# Patient Record
Sex: FEMALE | Race: WHITE | NOT HISPANIC OR LATINO | Employment: FULL TIME | URBAN - METROPOLITAN AREA
[De-identification: names, ages, dates, MRNs, and addresses within clinical notes are randomized per-mention and may not be internally consistent; named-entity substitution may affect disease eponyms.]

---

## 2017-07-03 ENCOUNTER — RADIANT APPOINTMENT (OUTPATIENT)
Dept: MAMMOGRAPHY | Facility: CLINIC | Age: 42
End: 2017-07-03
Attending: FAMILY MEDICINE
Payer: COMMERCIAL

## 2017-07-03 DIAGNOSIS — Z12.31 VISIT FOR SCREENING MAMMOGRAM: ICD-10-CM

## 2017-07-03 PROCEDURE — G0202 SCR MAMMO BI INCL CAD: HCPCS

## 2018-07-03 ENCOUNTER — HEALTH MAINTENANCE LETTER (OUTPATIENT)
Age: 43
End: 2018-07-03

## 2018-07-19 DIAGNOSIS — M79.629: Primary | ICD-10-CM

## 2018-07-25 ENCOUNTER — RADIANT APPOINTMENT (OUTPATIENT)
Dept: MAMMOGRAPHY | Facility: CLINIC | Age: 43
End: 2018-07-25
Payer: COMMERCIAL

## 2018-07-25 DIAGNOSIS — M79.629: ICD-10-CM

## 2018-09-08 ENCOUNTER — OFFICE VISIT (OUTPATIENT)
Dept: URGENT CARE | Facility: URGENT CARE | Age: 43
End: 2018-09-08
Payer: COMMERCIAL

## 2018-09-08 VITALS
SYSTOLIC BLOOD PRESSURE: 130 MMHG | WEIGHT: 189.8 LBS | OXYGEN SATURATION: 96 % | TEMPERATURE: 98.4 F | DIASTOLIC BLOOD PRESSURE: 84 MMHG | HEART RATE: 84 BPM | BODY MASS INDEX: 28.23 KG/M2

## 2018-09-08 DIAGNOSIS — J98.01 ACUTE BRONCHOSPASM: ICD-10-CM

## 2018-09-08 DIAGNOSIS — J20.8 VIRAL BRONCHITIS: Primary | ICD-10-CM

## 2018-09-08 PROCEDURE — 99213 OFFICE O/P EST LOW 20 MIN: CPT | Performed by: NURSE PRACTITIONER

## 2018-09-08 RX ORDER — PREDNISONE 20 MG/1
20 TABLET ORAL 2 TIMES DAILY
Qty: 10 TABLET | Refills: 0 | Status: SHIPPED | OUTPATIENT
Start: 2018-09-08 | End: 2018-09-13

## 2018-09-08 RX ORDER — ALBUTEROL SULFATE 90 UG/1
2 AEROSOL, METERED RESPIRATORY (INHALATION) EVERY 6 HOURS PRN
Qty: 1 INHALER | Refills: 0 | Status: SHIPPED | OUTPATIENT
Start: 2018-09-08 | End: 2021-01-11

## 2018-09-08 RX ORDER — CODEINE PHOSPHATE AND GUAIFENESIN 10; 100 MG/5ML; MG/5ML
1 SOLUTION ORAL EVERY 4 HOURS PRN
Qty: 200 ML | Refills: 0 | Status: SHIPPED | OUTPATIENT
Start: 2018-09-08 | End: 2018-09-13

## 2018-09-08 RX ORDER — CETIRIZINE HYDROCHLORIDE 10 MG/1
10 TABLET ORAL DAILY
COMMUNITY
End: 2021-01-11

## 2018-09-08 ASSESSMENT — ENCOUNTER SYMPTOMS
RHINORRHEA: 1
COUGH: 1
NAUSEA: 0
CHILLS: 0
HEADACHES: 0
SORE THROAT: 0
FEVER: 0
DIARRHEA: 0
SHORTNESS OF BREATH: 0
VOMITING: 0
WHEEZING: 1

## 2018-09-08 NOTE — MR AVS SNAPSHOT
After Visit Summary   9/8/2018    Sharon Waters    MRN: [id number]           Patient Information     Date Of Birth          1975        Visit Information        Provider Department      9/8/2018 10:30 AM Josefina Agarwal NP [facility]        Today's Diagnoses     Viral bronchitis    -  1    Acute bronchospasm          Care Instructions      Viral or Bacterial Bronchitis with Wheezing (Adult)    Bronchitis is an infection of the air passages. It often occurs during a cold and is usually caused by a virus. Symptoms include cough with mucus (phlegm) and low-grade fever. This illness is contagious during the first few days and is spread through the air by coughing and sneezing, or by direct contact (touching the sick person and then touching your own eyes, nose, or mouth).  If there is a lot of inflammation, air flow is restricted. The air passages may also go into spasm, especially if you have asthma. This causes wheezing and difficulty breathing even in people who do not have asthma.  Bronchitis usually lasts 7 to 14 days. The wheezing should improve with treatment during the first week. An inhaler is often prescribed to relax the air passages and stop wheezing. Antibiotics will be prescribed if your doctor thinks there is also a secondary bacterial infection.  Home care    If symptoms are severe, rest at home for the first 2 to 3 days. When you go back to your usual activities, don't let yourself get too tired.    Do not smoke. Also avoid being exposed to secondhand smoke.    You may use over-the-counter medicine to control fever or pain, unless another medicine was prescribed. Note: If you have chronic liver or kidney disease or have ever had a stomach ulcer or gastrointestinal bleeding, talk with your healthcare provider before using these medicines. Also talk to your provider if you are taking medicine to prevent blood clots.) Aspirin should never be given to anyone younger  than 18 years of age who is ill with a viral infection or fever. It may cause severe liver or brain damage.    Your appetite may be poor, so a light diet is fine. Avoid dehydration by drinking 6 to 8 glasses of fluids per day (such as water, soft drinks, sports drinks, juices, tea, or soup). Extra fluids will help loosen secretions in the nose and lungs.    Over-the-counter cough, cold, and sore-throat medicines will not shorten the length of the illness, but they may be helpful to reduce symptoms. (Note: Do not use decongestants if you have high blood pressure.)    If you were given an inhaler, use it exactly as directed. If you need to use it more often than prescribed, your condition may be worsening. If this happens, contact your healthcare provider.    If prescribed, finish all antibiotic medicine, even if you are feeling better after only a few days.  Follow-up care  Follow up with your healthcare provider, or as advised. If you had an X-ray or ECG (electrocardiogram), a specialist will review it. You will be notified of any new findings that may affect your care.  If you are age 65 or older, or if you have a chronic lung disease or condition that affects your immune system, or you smoke, ask your healthcare provider about getting a pneumococcal vaccine and a yearly flu shot (influenza vaccine).  When to seek medical advice  Call your healthcare provider right away if any of these occur:    Fever of 100.4 F (38 C) or higher, or as directed by your healthcare provider    Coughing up increasing amounts of colored sputum    Weakness, drowsiness, headache, facial pain, ear pain, or a stiff neck  Call 911  Call 911 if any of these occur.    Coughing up blood    Worsening weakness, drowsiness, headache, or stiff neck    Increased wheezing not helped with medication, shortness of breath, or pain with breathing  Date Last Reviewed: 9/13/2015 2000-2017 [employer]. [address] [facility], [place]  [address]. All rights reserved. This information is not intended as a substitute for professional medical care. Always follow your healthcare professional's instructions.                Follow-ups after your visit        Your next 10 appointments already scheduled     Sep 27, 2018 10:30 AM CDT   Physical with Ivelisse Brantley [place]   [facility] [place]    [address] [place] [id number] [phone]              Who to contact     If you have questions or need follow up information about today's clinic visit or your schedule please contact [facility] directly at [phone].  Normal or non-critical lab and imaging results will be communicated to you by MyChart, letter or phone within 4 business days after the clinic has received the results. If you do not hear from us within 7 days, please contact the clinic through [facility] or phone. If you have a critical or abnormal lab result, we will notify you by phone as soon as possible.  Submit refill requests through [employer] or call your pharmacy and they will forward the refill request to us. Please allow 3 business days for your refill to be completed.          Additional Information About Your Visit        [facility] Information     [employer] gives you secure access to your electronic health record. If you see a primary care provider, you can also send messages to your care team and make appointments. If you have questions, please call your primary care clinic.  If you do not have a primary care provider, please call [phone] and they will assist you.        Care EveryWhere ID     This is your Care EveryWhere ID. This could be used by other organizations to access your [facility] medical records  [id number]        Your Vitals Were     Pulse Temperature Pulse Oximetry BMI (Body Mass Index)          84 98.4  F (36.9  C) (Oral) 96% 28.23 kg/m2         Blood Pressure from Last 3 Encounters:    09/08/18 130/84   11/17/15 120/64   11/02/15 124/75    Weight from Last 3 Encounters:   09/08/18 189 lb 12.8 oz (86.1 kg)   11/17/15 178 lb 8 oz (81 kg)   11/02/15 179 lb (81.2 kg)              Today, you had the following     No orders found for display         Today's Medication Changes          These changes are accurate as of 9/8/18 10:48 AM.  If you have any questions, ask your nurse or doctor.               Start taking these medicines.        Dose/Directions    albuterol 108 (90 Base) MCG/ACT inhaler   Commonly known as:  PROAIR HFA/PROVENTIL HFA/VENTOLIN HFA   Used for:  Acute bronchospasm   Started by:  Josefina Agarwal NP        Dose:  2 puff   Inhale 2 puffs into the lungs every 6 hours as needed   Quantity:  1 Inhaler   Refills:  0       guaiFENesin-codeine 100-10 MG/5ML Soln solution   Commonly known as:  ROBITUSSIN AC   Used for:  Viral bronchitis   Started by:  Josefina Agarwal NP        Dose:  1 tsp.   Take 5 mLs by mouth every 4 hours as needed   Quantity:  200 mL   Refills:  0       predniSONE 20 MG tablet   Commonly known as:  DELTASONE   Used for:  Acute bronchospasm, Viral bronchitis   Started by:  Josefina Agarwal NP        Dose:  20 mg   Take 1 tablet (20 mg) by mouth 2 times daily for 5 days   Quantity:  10 tablet   Refills:  0            Where to get your medicines      These medications were sent to [facility] [address] [place] [address] AT [address] [address]     Phone:  [phone]     albuterol 108 (90 Base) MCG/ACT inhaler    predniSONE 20 MG tablet         Some of these will need a paper prescription and others can be bought over the counter.  Ask your nurse if you have questions.     Bring a paper prescription for each of these medications     guaiFENesin-codeine 100-10 MG/5ML Soln solution                Primary Care Provider Fax #    Physician No Ref-Primary [phone]       No address on file        Equal Access to  Services     [facility]: Hadii aad ku hadrandallo Soeddieali, waaxda luqadaha, qaybta kaalmada adeegerlinjayla, hallie brushjoselinmaya guerrier . So [facility] [phone].    ATENCIÓN: Si eleanor aponte, tiene a curran disposición servicios gratuitos de asistencia lingüística. Llame al [phone].    We comply with applicable federal civil rights laws and [place] laws. We do not discriminate on the basis of race, color, national origin, age, disability, sex, sexual orientation, or gender identity.            Thank you!     Thank you for choosing [facility]  for your care. Our goal is always to provide you with excellent care. Hearing back from our patients is one way we can continue to improve our services. Please take a few minutes to complete the written survey that you may receive in the mail after your visit with us. Thank you!             Your Updated Medication List - Protect others around you: Learn how to safely use, store and throw away your medicines at www.disposemymeds.org.          This list is accurate as of 9/8/18 10:48 AM.  Always use your most recent med list.                   Brand Name Dispense Instructions for use Diagnosis    acetaminophen-codeine 300-30 MG per tablet    TYLENOL w/CODEINE No. 3    28 tablet    Take 1-2 tablets by mouth every 4 hours as needed for mild pain    Throat pain       albuterol 108 (90 Base) MCG/ACT inhaler    PROAIR HFA/PROVENTIL HFA/VENTOLIN HFA    1 Inhaler    Inhale 2 puffs into the lungs every 6 hours as needed    Acute bronchospasm       cetirizine 10 MG tablet    zyrTEC     Take 10 mg by mouth daily        CLARITIN PO           guaiFENesin-codeine 100-10 MG/5ML Soln solution    ROBITUSSIN AC    200 mL    Take 5 mLs by mouth every 4 hours as needed    Viral bronchitis       penicillin V potassium 500 MG tablet    VEETID    20 tablet    Take 1 tablet (500 mg) by mouth 2 times daily    Acute streptococcal pharyngitis, Throat pain       predniSONE 20  MG tablet    DELTASONE    10 tablet    Take 1 tablet (20 mg) by mouth 2 times daily for 5 days    Acute bronchospasm, Viral bronchitis       VENTOLIN IN      Inhale into the lungs as needed

## 2018-09-08 NOTE — PROGRESS NOTES
SUBJECTIVE:   Sharon Waters is a 42 year old female presenting with a chief complaint of   Chief Complaint   Patient presents with     Cough     Patient complains of cough x 1 week and unable to sleep        She is an established patient of [facility]    Onset of symptoms was 1 day(s) ago.  Course of illness is worsening.    Severity moderate  Current and Associated symptoms: cough - productive, wheezing and fatigue  Treatment measures tried include OTC Cough med.  Predisposing factors include HX of asthma.      Review of Systems   Constitutional: Negative for chills and fever.   HENT: Positive for congestion and rhinorrhea. Negative for ear pain and sore throat.    Respiratory: Positive for cough and wheezing. Negative for shortness of breath.    Gastrointestinal: Negative for diarrhea, nausea and vomiting.   Neurological: Negative for headaches.   All other systems reviewed and are negative.      Past Medical History:   Diagnosis Date     NO ACTIVE PROBLEMS      Family History   Problem Relation Age of Onset     Diabetes Paternal Grandmother      Breast Cancer Paternal Grandmother      Colon Cancer Maternal Grandmother      Hypertension Mother      Current Outpatient Prescriptions   Medication Sig Dispense Refill     albuterol (PROAIR HFA/PROVENTIL HFA/VENTOLIN HFA) 108 (90 Base) MCG/ACT inhaler Inhale 2 puffs into the lungs every 6 hours as needed 1 Inhaler 0     Albuterol (VENTOLIN IN) Inhale into the lungs as needed        cetirizine (ZYRTEC) 10 MG tablet Take 10 mg by mouth daily       guaiFENesin-codeine (ROBITUSSIN AC) 100-10 MG/5ML SOLN solution Take 5 mLs by mouth every 4 hours as needed 200 mL 0     predniSONE (DELTASONE) 20 MG tablet Take 1 tablet (20 mg) by mouth 2 times daily for 5 days 10 tablet 0     acetaminophen-codeine (TYLENOL W/CODEINE NO. 3) 300-30 MG per tablet Take 1-2 tablets by mouth every 4 hours as needed for mild pain (Patient not taking: Reported on 9/8/2018) 28 tablet 0      Loratadine (CLARITIN PO)        penicillin V potassium (VEETID) 500 MG tablet Take 1 tablet (500 mg) by mouth 2 times daily (Patient not taking: Reported on 9/8/2018) 20 tablet 0     Social History   Substance Use Topics     Smoking status: Never Smoker     Smokeless tobacco: Never Used     Alcohol use Yes      Comment: 1-2 per week       OBJECTIVE  /84 (BP Location: Left arm, Patient Position: Chair, Cuff Size: Adult Regular)  Pulse 84  Temp 98.4  F (36.9  C) (Oral)  Wt 189 lb 12.8 oz (86.1 kg)  SpO2 96%  BMI 28.23 kg/m2    Physical Exam   HENT:   Nose: Mucosal edema and rhinorrhea present.   Cardiovascular: Normal rate, S1 normal and normal heart sounds.    Pulmonary/Chest: Effort normal and breath sounds normal.   Neurological: She is alert.   Psychiatric: She has a normal mood and affect. Her speech is normal and behavior is normal.       ASSESSMENT:      ICD-10-CM    1. Viral bronchitis J20.8 predniSONE (DELTASONE) 20 MG tablet     guaiFENesin-codeine (ROBITUSSIN AC) 100-10 MG/5ML SOLN solution   2. Acute bronchospasm J98.01 albuterol (PROAIR HFA/PROVENTIL HFA/VENTOLIN HFA) 108 (90 Base) MCG/ACT inhaler     predniSONE (DELTASONE) 20 MG tablet        PLAN:  Albuterol every 4 hours for the next 48 hours, then as needed.  Increase hydration, rest  Follow up with PCP if symptoms are persisting in 3 days or sooner if getting worse.  All questions are answered and patient is in agreement with plan     Patient Instructions     Viral or Bacterial Bronchitis with Wheezing (Adult)    Bronchitis is an infection of the air passages. It often occurs during a cold and is usually caused by a virus. Symptoms include cough with mucus (phlegm) and low-grade fever. This illness is contagious during the first few days and is spread through the air by coughing and sneezing, or by direct contact (touching the sick person and then touching your own eyes, nose, or mouth).  If there is a lot of inflammation, air flow is  restricted. The air passages may also go into spasm, especially if you have asthma. This causes wheezing and difficulty breathing even in people who do not have asthma.  Bronchitis usually lasts 7 to 14 days. The wheezing should improve with treatment during the first week. An inhaler is often prescribed to relax the air passages and stop wheezing. Antibiotics will be prescribed if your doctor thinks there is also a secondary bacterial infection.  Home care    If symptoms are severe, rest at home for the first 2 to 3 days. When you go back to your usual activities, don't let yourself get too tired.    Do not smoke. Also avoid being exposed to secondhand smoke.    You may use over-the-counter medicine to control fever or pain, unless another medicine was prescribed. Note: If you have chronic liver or kidney disease or have ever had a stomach ulcer or gastrointestinal bleeding, talk with your healthcare provider before using these medicines. Also talk to your provider if you are taking medicine to prevent blood clots.) Aspirin should never be given to anyone younger than 18 years of age who is ill with a viral infection or fever. It may cause severe liver or brain damage.    Your appetite may be poor, so a light diet is fine. Avoid dehydration by drinking 6 to 8 glasses of fluids per day (such as water, soft drinks, sports drinks, juices, tea, or soup). Extra fluids will help loosen secretions in the nose and lungs.    Over-the-counter cough, cold, and sore-throat medicines will not shorten the length of the illness, but they may be helpful to reduce symptoms. (Note: Do not use decongestants if you have high blood pressure.)    If you were given an inhaler, use it exactly as directed. If you need to use it more often than prescribed, your condition may be worsening. If this happens, contact your healthcare provider.    If prescribed, finish all antibiotic medicine, even if you are feeling better after only a few  days.  Follow-up care  Follow up with your healthcare provider, or as advised. If you had an X-ray or ECG (electrocardiogram), a specialist will review it. You will be notified of any new findings that may affect your care.  If you are age 65 or older, or if you have a chronic lung disease or condition that affects your immune system, or you smoke, ask your healthcare provider about getting a pneumococcal vaccine and a yearly flu shot (influenza vaccine).  When to seek medical advice  Call your healthcare provider right away if any of these occur:    Fever of 100.4 F (38 C) or higher, or as directed by your healthcare provider    Coughing up increasing amounts of colored sputum    Weakness, drowsiness, headache, facial pain, ear pain, or a stiff neck  Call 911  Call 911 if any of these occur.    Coughing up blood    Worsening weakness, drowsiness, headache, or stiff neck    Increased wheezing not helped with medication, shortness of breath, or pain with breathing  Date Last Reviewed: 9/13/2015 2000-2017 [employer]. [address] [place] [address], [place] [address]. All rights reserved. This information is not intended as a substitute for professional medical care. Always follow your healthcare professional's instructions.

## 2018-09-08 NOTE — PATIENT INSTRUCTIONS
Viral or Bacterial Bronchitis with Wheezing (Adult)    Bronchitis is an infection of the air passages. It often occurs during a cold and is usually caused by a virus. Symptoms include cough with mucus (phlegm) and low-grade fever. This illness is contagious during the first few days and is spread through the air by coughing and sneezing, or by direct contact (touching the sick person and then touching your own eyes, nose, or mouth).  If there is a lot of inflammation, air flow is restricted. The air passages may also go into spasm, especially if you have asthma. This causes wheezing and difficulty breathing even in people who do not have asthma.  Bronchitis usually lasts 7 to 14 days. The wheezing should improve with treatment during the first week. An inhaler is often prescribed to relax the air passages and stop wheezing. Antibiotics will be prescribed if your doctor thinks there is also a secondary bacterial infection.  Home care    If symptoms are severe, rest at home for the first 2 to 3 days. When you go back to your usual activities, don't let yourself get too tired.    Do not smoke. Also avoid being exposed to secondhand smoke.    You may use over-the-counter medicine to control fever or pain, unless another medicine was prescribed. Note: If you have chronic liver or kidney disease or have ever had a stomach ulcer or gastrointestinal bleeding, talk with your healthcare provider before using these medicines. Also talk to your provider if you are taking medicine to prevent blood clots.) Aspirin should never be given to anyone younger than 18 years of age who is ill with a viral infection or fever. It may cause severe liver or brain damage.    Your appetite may be poor, so a light diet is fine. Avoid dehydration by drinking 6 to 8 glasses of fluids per day (such as water, soft drinks, sports drinks, juices, tea, or soup). Extra fluids will help loosen secretions in the nose and lungs.    Over-the-counter  cough, cold, and sore-throat medicines will not shorten the length of the illness, but they may be helpful to reduce symptoms. (Note: Do not use decongestants if you have high blood pressure.)    If you were given an inhaler, use it exactly as directed. If you need to use it more often than prescribed, your condition may be worsening. If this happens, contact your healthcare provider.    If prescribed, finish all antibiotic medicine, even if you are feeling better after only a few days.  Follow-up care  Follow up with your healthcare provider, or as advised. If you had an X-ray or ECG (electrocardiogram), a specialist will review it. You will be notified of any new findings that may affect your care.  If you are age 65 or older, or if you have a chronic lung disease or condition that affects your immune system, or you smoke, ask your healthcare provider about getting a pneumococcal vaccine and a yearly flu shot (influenza vaccine).  When to seek medical advice  Call your healthcare provider right away if any of these occur:    Fever of 100.4 F (38 C) or higher, or as directed by your healthcare provider    Coughing up increasing amounts of colored sputum    Weakness, drowsiness, headache, facial pain, ear pain, or a stiff neck  Call 911  Call 911 if any of these occur.    Coughing up blood    Worsening weakness, drowsiness, headache, or stiff neck    Increased wheezing not helped with medication, shortness of breath, or pain with breathing  Date Last Reviewed: 9/13/2015 2000-2017 [employer]. [address] [place] [address]. All rights reserved. This information is not intended as a substitute for professional medical care. Always follow your healthcare professional's instructions.

## 2018-09-14 ENCOUNTER — OFFICE VISIT (OUTPATIENT)
Dept: URGENT CARE | Facility: URGENT CARE | Age: 43
End: 2018-09-14
Payer: COMMERCIAL

## 2018-09-14 VITALS
OXYGEN SATURATION: 96 % | BODY MASS INDEX: 27.82 KG/M2 | RESPIRATION RATE: 17 BRPM | WEIGHT: 187 LBS | SYSTOLIC BLOOD PRESSURE: 109 MMHG | DIASTOLIC BLOOD PRESSURE: 77 MMHG | TEMPERATURE: 98.2 F | HEART RATE: 82 BPM

## 2018-09-14 DIAGNOSIS — R42 DIZZINESS: ICD-10-CM

## 2018-09-14 DIAGNOSIS — M25.469 KNEE SWELLING: ICD-10-CM

## 2018-09-14 DIAGNOSIS — R07.9 CHEST PAIN, UNSPECIFIED TYPE: Primary | ICD-10-CM

## 2018-09-14 PROCEDURE — 99213 OFFICE O/P EST LOW 20 MIN: CPT | Performed by: PHYSICIAN ASSISTANT

## 2018-09-14 NOTE — MR AVS SNAPSHOT
After Visit Summary   9/14/2018    Sharon Waters    MRN: [id number]           Patient Information     Date Of Birth          1975        Visit Information        Provider Department      9/14/2018 7:25 PM Joelle Segura PA-C [facility]        Today's Diagnoses     Chest pain, unspecified type    -  1    Dizziness        Knee swelling           Follow-ups after your visit        Your next 10 appointments already scheduled     Sep 27, 2018 10:30 AM CDT   Physical with Ivelisse Brantley [place]   [facility] [place]    [address] [place] [id number] [phone]              Who to contact     If you have questions or need follow up information about today's clinic visit or your schedule please contact [facility] directly at [phone].  Normal or non-critical lab and imaging results will be communicated to you by MyChart, letter or phone within 4 business days after the clinic has received the results. If you do not hear from us within 7 days, please contact the clinic through MyChart or phone. If you have a critical or abnormal lab result, we will notify you by phone as soon as possible.  Submit refill requests through Kampyle or call your pharmacy and they will forward the refill request to us. Please allow 3 business days for your refill to be completed.          Additional Information About Your Visit        MyChart Information     Kampyle gives you secure access to your electronic health record. If you see a primary care provider, you can also send messages to your care team and make appointments. If you have questions, please call your primary care clinic.  If you do not have a primary care provider, please call [phone] and they will assist you.        Care EveryWhere ID     This is your Care EveryWhere ID. This could be used by other organizations to access your [facility]  records  [id number]        Your Vitals Were     Pulse Temperature Respirations Pulse Oximetry BMI (Body Mass Index)       82 98.2  F (36.8  C) (Oral) 17 96% 27.82 kg/m2        Blood Pressure from Last 3 Encounters:   09/14/18 109/77   09/08/18 130/84   11/17/15 120/64    Weight from Last 3 Encounters:   09/14/18 187 lb (84.8 kg)   09/08/18 189 lb 12.8 oz (86.1 kg)   11/17/15 178 lb 8 oz (81 kg)              Today, you had the following     No orders found for display       Primary Care Provider Fax #    Physician No Ref-Primary [phone]       No address on file        Equal Access to Services     RYAN AGUILERA : Myriam Whiting, alexander thompson, qaandria kaalmajayla teixeira, hallie guerrier . So [facility] [phone].    ATENCIÓN: Si habla español, tiene a curran disposición servicios gratuitos de asistencia lingüística. Llame al [phone].    We comply with applicable federal civil rights laws and [place] laws. We do not discriminate on the basis of race, color, national origin, age, disability, sex, sexual orientation, or gender identity.            Thank you!     Thank you for choosing [facility]  for your care. Our goal is always to provide you with excellent care. Hearing back from our patients is one way we can continue to improve our services. Please take a few minutes to complete the written survey that you may receive in the mail after your visit with us. Thank you!             Your Updated Medication List - Protect others around you: Learn how to safely use, store and throw away your medicines at www.disposemymeds.org.          This list is accurate as of 9/14/18  9:05 PM.  Always use your most recent med list.                   Brand Name Dispense Instructions for use Diagnosis    acetaminophen-codeine 300-30 MG per tablet    TYLENOL w/CODEINE No. 3    28 tablet    Take 1-2 tablets by mouth every 4 hours as needed for mild pain    Throat pain        albuterol 108 (90 Base) MCG/ACT inhaler    PROAIR HFA/PROVENTIL HFA/VENTOLIN HFA    1 Inhaler    Inhale 2 puffs into the lungs every 6 hours as needed    Acute bronchospasm       cetirizine 10 MG tablet    zyrTEC     Take 10 mg by mouth daily

## 2018-09-15 NOTE — PROGRESS NOTES
S: 42-year-old female with Mirena IUD comes in for follow-up bronchitis.  She was seen September 8 here in urgent care and given prednisone for bronchitis.  She finished the prednisone 2 days ago.  She states the cough is better but now she reports some chest pain and dizziness that started yesterday.  She has been feeling tired.  She does have a history of asthma.  Also has had some bilateral knee swelling develop which she describes as a tightness.  She denies any calf swelling or tenderness.  She did come back from [place] on September 2, 2018.  No nausea or vomiting.  No family history of rheumatoid arthritis or gout.  She denies headache.  No fever.  No rash.  No other joint pains. Some SHORTNESS OF BREATH. No ST. No tick bites      Allergies   Allergen Reactions     Seasonal Allergies        Past Medical History:   Diagnosis Date     NO ACTIVE PROBLEMS          Current Outpatient Prescriptions on File Prior to Visit:  albuterol (PROAIR HFA/PROVENTIL HFA/VENTOLIN HFA) 108 (90 Base) MCG/ACT inhaler Inhale 2 puffs into the lungs every 6 hours as needed   cetirizine (ZYRTEC) 10 MG tablet Take 10 mg by mouth daily   acetaminophen-codeine (TYLENOL W/CODEINE NO. 3) 300-30 MG per tablet Take 1-2 tablets by mouth every 4 hours as needed for mild pain (Patient not taking: Reported on 9/8/2018)     No current facility-administered medications on file prior to visit.     Social History   Substance Use Topics     Smoking status: Never Smoker     Smokeless tobacco: Never Used     Alcohol use Yes      Comment: 1-2 per week       ROS:  CONSTITUTIONAL: Negative for  fever.  EYES: Negative for eye problems.  ENT: As above.  RESP: As above.  CV: Negative for chest pains.  GI: Negative for vomiting.  MUSCULOSKELETAL:  Negative for significant muscle or joint pains.  NEUROLOGIC: Negative for headaches.  SKIN: Negative for rash.    OBJECTIVE:  /77 (BP Location: Left arm, Patient Position: Chair, Cuff Size: Adult Large)   Pulse 82  Temp 98.2  F (36.8  C) (Oral)  Resp 17  Wt 187 lb (84.8 kg)  SpO2 96%  BMI 27.82 kg/m2  GENERAL APPEARANCE: Healthy, alert and no distress.  EYES:Conjunctiva/sclera clear.  THROAT: No erythema w/o tonsillar enlargement . No exudates.  NECK: Supple, nontender, no lymphadenopathy.  RESP: Lungs clear to auscultation - no rales, rhonchi or wheezes  NEURO: Awake, alert    SKIN: No rashes  Knees no obvious swelling noted.  Calves without swelling.  No calf tenderness.  Negative Homans bilaterally.      ASSESSMENT:     ICD-10-CM    1. Chest pain, unspecified type R07.9    2. Dizziness R42    3. Knee swelling M25.469        PLAN: With chest pain, dizziness, cough and trip to [place] I somewhat worry about pulmonary embolism. It could also just be related to her asthma. I feel that she should go to the emergency room for evaluation.  I am unsure about  the etiology of the knee pain and swelling and if this is related to her respiratory symptoms or not.     Joelle Segura PA-C

## 2018-09-27 ENCOUNTER — OFFICE VISIT (OUTPATIENT)
Dept: OBGYN | Facility: CLINIC | Age: 43
End: 2018-09-27
Payer: COMMERCIAL

## 2018-09-27 ENCOUNTER — MYC MEDICAL ADVICE (OUTPATIENT)
Dept: OBGYN | Facility: CLINIC | Age: 43
End: 2018-09-27

## 2018-09-27 VITALS
SYSTOLIC BLOOD PRESSURE: 121 MMHG | BODY MASS INDEX: 27.96 KG/M2 | WEIGHT: 188.8 LBS | HEIGHT: 69 IN | TEMPERATURE: 98.4 F | DIASTOLIC BLOOD PRESSURE: 72 MMHG | HEART RATE: 72 BPM

## 2018-09-27 DIAGNOSIS — Z12.4 CERVICAL CANCER SCREENING: ICD-10-CM

## 2018-09-27 DIAGNOSIS — Z01.419 ENCOUNTER FOR GYNECOLOGICAL EXAMINATION WITHOUT ABNORMAL FINDING: Primary | ICD-10-CM

## 2018-09-27 DIAGNOSIS — Z23 NEED FOR TDAP VACCINATION: ICD-10-CM

## 2018-09-27 PROCEDURE — 90471 IMMUNIZATION ADMIN: CPT | Performed by: OBSTETRICS & GYNECOLOGY

## 2018-09-27 PROCEDURE — 90715 TDAP VACCINE 7 YRS/> IM: CPT | Performed by: OBSTETRICS & GYNECOLOGY

## 2018-09-27 PROCEDURE — 87624 HPV HI-RISK TYP POOLED RSLT: CPT | Performed by: OBSTETRICS & GYNECOLOGY

## 2018-09-27 PROCEDURE — G0145 SCR C/V CYTO,THINLAYER,RESCR: HCPCS | Performed by: OBSTETRICS & GYNECOLOGY

## 2018-09-27 PROCEDURE — 99386 PREV VISIT NEW AGE 40-64: CPT | Mod: 25 | Performed by: OBSTETRICS & GYNECOLOGY

## 2018-09-27 NOTE — PROGRESS NOTES
Sharon is a 42 year old  female who presents for annual exam.     Menses are absent since having a mirena IUD placed in .  No LMP recorded. Patient is not currently having periods (Reason: IUD).. Using IUD for contraception.  She is not currently considering pregnancy.  Besides routine health maintenance,  she would like to discuss trim iud string.  Initially her  complained of feeling the strings, but hasn't in a while.  She is wondering if they should be trimmed.  GYNECOLOGIC HISTORY:  Menarche: 14  Age at first intercourse: 18 Number of lifetime partners: 6  Sharon is sexually active with male partner(s) and is currently in monogamous relationship with .    History sexually transmitted infections:No STD history  STI testing offered?  Declined  EVENS exposure: No  History of abnormal Pap smear: NO - age 30- 65 PAP every 3 years recommended  Family history of breast CA: Yes (Please explain): paternal grandmother  Family history of uterine/ovarian CA: No    Family history of colon CA: Yes (Please explain): maternal grandmother    HEALTH MAINTENANCE:  Cholesterol: (No results found for: CHOL History of abnormal lipids: No  Mammo: 2018 . History of abnormal Mammo: No.  Regular Self Breast Exams: occ  Calcium/Vitamin D intake: source:  dairy Adequate? Yes  TSH: (No results found for: TSH )  Pap; (  Lab Results   Component Value Date    PAP NIL 2015    )    HISTORY:  Obstetric History       T3      L3     SAB0   TAB0   Ectopic0   Multiple0   Live Births3       # Outcome Date GA Lbr Josh/2nd Weight Sex Delivery Anes PTL Lv   3 Term         YISSEL   2 Term         YISSEL   1 Term         YISSEL        Past Medical History:   Diagnosis Date     NO ACTIVE PROBLEMS      Past Surgical History:   Procedure Laterality Date     NO HISTORY OF SURGERY       Family History   Problem Relation Age of Onset     Hypertension Mother      Colon Cancer Maternal Grandmother       "Diabetes Paternal Grandmother      Breast Cancer Paternal Grandmother      Social History     Social History     Marital status:      Spouse name: N/A     Number of children: N/A     Years of education: N/A     Social History Main Topics     Smoking status: Never Smoker     Smokeless tobacco: Never Used     Alcohol use Yes      Comment: 1-2 per week     Drug use: No     Sexual activity: Yes     Partners: Male     Birth control/ protection: IUD     Other Topics Concern     Not on file     Social History Narrative       Current Outpatient Prescriptions:      levonorgestrel (MIRENA) 20 MCG/24HR IUD, 1 each by Intrauterine route once, Disp: , Rfl:      albuterol (PROAIR HFA/PROVENTIL HFA/VENTOLIN HFA) 108 (90 Base) MCG/ACT inhaler, Inhale 2 puffs into the lungs every 6 hours as needed, Disp: 1 Inhaler, Rfl: 0     cetirizine (ZYRTEC) 10 MG tablet, Take 10 mg by mouth daily, Disp: , Rfl:      Allergies   Allergen Reactions     Seasonal Allergies        Past medical, surgical, social and family history were reviewed and updated in EPIC.    ROS:   C:     NEGATIVE for fever, chills, change in weight  I:       NEGATIVE for worrisome rashes, moles or lesions  E:     NEGATIVE for vision changes or irritation  E/M: NEGATIVE for ear, mouth and throat problems  R:     NEGATIVE for significant cough or SOB  CV:   NEGATIVE for chest pain, palpitations or peripheral edema  GI:     NEGATIVE for nausea, abdominal pain, heartburn, or change in bowel habits  :   NEGATIVE for frequency, dysuria, hematuria, vaginal discharge, or irregular bleeding  M:     NEGATIVE for significant arthralgias or myalgia  N:      NEGATIVE for weakness, dizziness or paresthesias  E:      NEGATIVE for temperature intolerance, skin/hair changes  P:      NEGATIVE for changes in mood or affect.    EXAM:  /72  Pulse 72  Temp 98.4  F (36.9  C) (Oral)  Ht 5' 9\" (1.753 m)  Wt 188 lb 12.8 oz (85.6 kg)  BMI 27.88 kg/m2   BMI: Body mass index is " 27.88 kg/(m^2).  Constitutional: healthy, alert and no distress  Head: Normocephalic. No masses, lesions, tenderness or abnormalities  Neck: Neck supple. Trachea midline. No adenopathy. Thyroid symmetric, normal size.   Cardiovascular: RRR.   Respiratory: Negative.   Breast: No nodularity, asymmetry or nipple discharge bilaterally.  Gastrointestinal: Abdomen soft, non-tender, non-distended. No masses, organomegaly.  :  Vulva:  No external lesions, normal female hair distribution, no inguinal adenopathy.    Urethra:  Midline, non-tender, well supported, no discharge  Vagina:  Moist, pink, no abnormal discharge, no lesions.  IUD string visible, tucked under cervix.  Not obvious visible tip of string.  Uterus:  Normal size, non-tender, freely mobile  Ovaries:  No masses appreciated, non-tender, mobile  Rectal Exam: deferred  Musculoskeletal: extremities normal  Skin: no suspicious lesions or rashes  Psychiatric: Affect appropriate, cooperative,mentation appears normal.     COUNSELING:   Reviewed preventive health counseling, as reflected in patient instructions  Special attention given to:        Regular exercise       Healthy diet/nutrition       Contraception       Osteoporosis Prevention/Bone Health       Safe sex practices/STD prevention   reports that she has never smoked. She has never used smokeless tobacco.    Body mass index is 27.88 kg/(m^2).  Weight management plan: Discussed healthy diet and exercise guidelines and patient will follow up in 12 months in clinic to re-evaluate.  FRAX Risk Assessment    ASSESSMENT:  42 year old female with satisfactory annual exam  Plan: cotesting done.  Discussed IUD string location, and she decided to hold off trimming since it has been awhile since  mentioned it.  Labs were done through work, will email to me.  jennifer LOZANO.  RTC yearly or prn.    ROSIE ALDANA MD

## 2018-09-27 NOTE — MR AVS SNAPSHOT
"              After Visit Summary   9/27/2018    Sharon Waters    MRN: [id number]           Patient Information     Date Of Birth          1975        Visit Information        Provider Department      9/27/2018 10:30 AM Ivelisse Brantley MD [facility]        Today's Diagnoses     Encounter for gynecological examination without abnormal finding    -  1    Need for Tdap vaccination           Follow-ups after your visit        Who to contact     If you have questions or need follow up information about today's clinic visit or your schedule please contact [facility] directly at [phone].  Normal or non-critical lab and imaging results will be communicated to you by Apoforehart, letter or phone within 4 business days after the clinic has received the results. If you do not hear from us within 7 days, please contact the clinic through Apoforehart or phone. If you have a critical or abnormal lab result, we will notify you by phone as soon as possible.  Submit refill requests through Weatherista or call your pharmacy and they will forward the refill request to us. Please allow 3 business days for your refill to be completed.          Additional Information About Your Visit        MyChart Information     Weatherista gives you secure access to your electronic health record. If you see a primary care provider, you can also send messages to your care team and make appointments. If you have questions, please call your primary care clinic.  If you do not have a primary care provider, please call [phone] and they will assist you.        Care EveryWhere ID     This is your Care EveryWhere ID. This could be used by other organizations to access your [facility] medical records  [id number]        Your Vitals Were     Pulse Temperature Height BMI (Body Mass Index)          72 98.4  F (36.9  C) (Oral) 5' 9\" (1.753 m) 27.88 kg/m2         Blood Pressure from Last 3 Encounters:   09/27/18 121/72   09/14/18 " 109/77   09/08/18 130/84    Weight from Last 3 Encounters:   09/27/18 188 lb 12.8 oz (85.6 kg)   09/14/18 187 lb (84.8 kg)   09/08/18 189 lb 12.8 oz (86.1 kg)              We Performed the Following     TDAP VACCINE (BOOSTRIX)     VACCINE ADMINISTRATION, INITIAL        Primary Care Provider Fax #    Physician No Ref-Primary [phone]       No address on file        Equal Access to Services     RYAN AGUILERA : Hadii aad ku hadasho Soomaali, waaxda luqadaha, qaybta kaalmada adeegyada, waxay annin haybeniton phill brushjoselinmaya guerrier . So [facility] [phone].    ATENCIÓN: Si habla español, tiene a curran disposición servicios gratuitos de asistencia lingüística. [facility] [phone].    We comply with applicable federal civil rights laws and [place] laws. We do not discriminate on the basis of race, color, national origin, age, disability, sex, sexual orientation, or gender identity.            Thank you!     Thank you for choosing [facility]  for your [place]. Our goal is always to provide you with excellent care. Hearing back from our patients is one way we can continue to improve our services. Please take a few minutes to complete the written survey that you may receive in the mail after your visit with us. Thank you!             Your Updated Medication List - Protect others around you: Learn how to safely use, store and throw away your medicines at www.disposemymeds.org.          This list is accurate as of 9/27/18  1:41 PM.  Always use your most recent med list.                   Brand Name Dispense Instructions for use Diagnosis    albuterol 108 (90 Base) MCG/ACT inhaler    PROAIR HFA/PROVENTIL HFA/VENTOLIN HFA    1 Inhaler    Inhale 2 puffs into the lungs every 6 hours as needed    Acute bronchospasm       cetirizine 10 MG tablet    zyrTEC     Take 10 mg by mouth daily        levonorgestrel 20 MCG/24HR IUD    MIRENA     1 each by Intrauterine route once

## 2018-09-27 NOTE — NURSING NOTE
"Chief Complaint   Patient presents with     Physical     annual and pap       Initial /72  Pulse 72  Temp 98.4  F (36.9  C) (Oral)  Ht 5' 9\" (1.753 m)  Wt 188 lb 12.8 oz (85.6 kg)  BMI 27.88 kg/m2 Estimated body mass index is 27.88 kg/(m^2) as calculated from the following:    Height as of this encounter: 5' 9\" (1.753 m).    Weight as of this encounter: 188 lb 12.8 oz (85.6 kg).  BP completed using cuff size: regular        The following HM Due: pap smear      The following patient reported/Care Every where data was sent to:  P ABSTRACT QUALITY INITIATIVES [[id number]]        patient has appointment for today  Yaneth Lobato                "

## 2018-09-27 NOTE — LETTER
October 5, 2018    Sharon Waters  [address] [address]    Dear Sharon,  We are happy to inform you that your PAP smear result from 09/27/18 is normal.  We are now able to do a follow up test on PAP smears. The DNA test is for HPV (Human Papilloma Virus). Cervical cancer is closely linked with certain types of HPV. Your results showed no evidence of high risk HPV.  Therefore we recommend you return in 3 years for your next pap smear.  You will still need to return to the clinic every year for an annual exam and other preventive tests.  If you have additional questions regarding this result, please call our registered nurse, Toshia at [phone].  Sincerely,    Ivelisse Brantley MD./  Toshia Anderson RN-Pap Tracking

## 2018-09-28 ASSESSMENT — PATIENT HEALTH QUESTIONNAIRE - PHQ9: SUM OF ALL RESPONSES TO PHQ QUESTIONS 1-9: 1

## 2018-10-01 LAB
COPATH REPORT: NORMAL
PAP: NORMAL

## 2018-10-03 LAB
FINAL DIAGNOSIS: NORMAL
HPV HR 12 DNA CVX QL NAA+PROBE: NEGATIVE
HPV16 DNA SPEC QL NAA+PROBE: NEGATIVE
HPV18 DNA SPEC QL NAA+PROBE: NEGATIVE
SPECIMEN DESCRIPTION: NORMAL
SPECIMEN SOURCE CVX/VAG CYTO: NORMAL

## 2019-09-09 ENCOUNTER — ANCILLARY PROCEDURE (OUTPATIENT)
Dept: MAMMOGRAPHY | Facility: CLINIC | Age: 44
End: 2019-09-09
Attending: OBSTETRICS & GYNECOLOGY
Payer: COMMERCIAL

## 2019-09-09 DIAGNOSIS — Z12.31 VISIT FOR SCREENING MAMMOGRAM: ICD-10-CM

## 2019-09-09 PROCEDURE — 77067 SCR MAMMO BI INCL CAD: CPT

## 2020-03-10 ENCOUNTER — HEALTH MAINTENANCE LETTER (OUTPATIENT)
Age: 45
End: 2020-03-10

## 2020-06-05 ENCOUNTER — VIRTUAL VISIT (OUTPATIENT)
Dept: FAMILY MEDICINE | Facility: OTHER | Age: 45
End: 2020-06-05

## 2020-06-05 NOTE — PROGRESS NOTES
"Date: 2020 10:23:11  Clinician: Fannie Ariza  Clinician NPI: [id number]  Patient: Sharon Waters  Patient : 1975  Patient Address: [address] [place] [address]  Patient Phone: [phone]  Visit Protocol: IBS  Patient Summary:  Sharon is a 44 year old ( : 1975 ) female who initiated a Visit for evaluation of IBS. When asked the question \"Please sign me up to receive news, health information and promotions from [employer].\", Sharon responded \"No\".     In the last 3 months, she experienced the following:     Hard or lumpy stools: never     Loose, mushy or watery stools: never     Blood in the stool: never     Black stools: never     Vomited blood: never      The patient denies the following: abdominal pain that interferes with sleep, diarrhea that interferes with sleep, pain with urination, feeling feverish, recent abdominal trauma or injury, increased urination frequency, reflux, heartburn, unintentional weight loss, severe sharp abdominal pain, and back pain associated with stomach symptoms.   To treat these symptoms, the patient made the following diet modifications:    The patient does not smoke or use smokeless tobacco.   She denies pregnancy and denies breastfeeding. She does not menstruate.   Additional information as reported by the patient (free text): Pink and watery eyes     MEDICATIONS: Aller-Phillip nasal, Mirena intrauterine, ALLERGIES: NKDA  Clinician Response:  Dear Sharon,  I am sorry you are not feeling well. To determine the most appropriate care for you, I would like you to be seen in person to further discuss your health history and symptoms.  You will not be charged for this Visit. Thank you for trusting us with your care.   Diagnosis: Refer for additional evaluation  Diagnosis ICD: R69  "

## 2020-09-16 ENCOUNTER — ANCILLARY PROCEDURE (OUTPATIENT)
Dept: MAMMOGRAPHY | Facility: CLINIC | Age: 45
End: 2020-09-16
Attending: OBSTETRICS & GYNECOLOGY
Payer: COMMERCIAL

## 2020-09-16 DIAGNOSIS — Z12.31 VISIT FOR SCREENING MAMMOGRAM: ICD-10-CM

## 2020-09-16 PROCEDURE — 77067 SCR MAMMO BI INCL CAD: CPT

## 2020-12-20 ENCOUNTER — HEALTH MAINTENANCE LETTER (OUTPATIENT)
Age: 45
End: 2020-12-20

## 2021-01-09 ASSESSMENT — ENCOUNTER SYMPTOMS
MYALGIAS: 0
NERVOUS/ANXIOUS: 0
CHILLS: 0
JOINT SWELLING: 0
PARESTHESIAS: 0
HEADACHES: 0
EYE PAIN: 0
HEMATURIA: 0
FEVER: 0
HEARTBURN: 0
ARTHRALGIAS: 0
SHORTNESS OF BREATH: 0
DIARRHEA: 0
COUGH: 0
DYSURIA: 0
HEMATOCHEZIA: 0
DIZZINESS: 0
ABDOMINAL PAIN: 0
NAUSEA: 0
BREAST MASS: 0
PALPITATIONS: 0
CONSTIPATION: 0
WEAKNESS: 0
FREQUENCY: 0
SORE THROAT: 0

## 2021-01-11 ENCOUNTER — OFFICE VISIT (OUTPATIENT)
Dept: FAMILY MEDICINE | Facility: CLINIC | Age: 46
End: 2021-01-11
Payer: COMMERCIAL

## 2021-01-11 VITALS
WEIGHT: 199.25 LBS | SYSTOLIC BLOOD PRESSURE: 118 MMHG | HEIGHT: 69 IN | HEART RATE: 82 BPM | DIASTOLIC BLOOD PRESSURE: 72 MMHG | OXYGEN SATURATION: 97 % | BODY MASS INDEX: 29.51 KG/M2

## 2021-01-11 DIAGNOSIS — Z13.220 LIPID SCREENING: ICD-10-CM

## 2021-01-11 DIAGNOSIS — Z30.433 ENCOUNTER FOR IUD REMOVAL AND REINSERTION: ICD-10-CM

## 2021-01-11 DIAGNOSIS — L29.9 EAR ITCHING: ICD-10-CM

## 2021-01-11 DIAGNOSIS — G56.01 CARPAL TUNNEL SYNDROME OF RIGHT WRIST: ICD-10-CM

## 2021-01-11 DIAGNOSIS — R06.83 SNORING: ICD-10-CM

## 2021-01-11 DIAGNOSIS — Z00.00 ROUTINE GENERAL MEDICAL EXAMINATION AT A HEALTH CARE FACILITY: Primary | ICD-10-CM

## 2021-01-11 DIAGNOSIS — Z13.220 SCREENING FOR HYPERLIPIDEMIA: ICD-10-CM

## 2021-01-11 DIAGNOSIS — Z13.29 SCREENING FOR THYROID DISORDER: ICD-10-CM

## 2021-01-11 DIAGNOSIS — Z11.59 NEED FOR HEPATITIS C SCREENING TEST: ICD-10-CM

## 2021-01-11 DIAGNOSIS — Z11.4 SCREENING FOR HIV (HUMAN IMMUNODEFICIENCY VIRUS): ICD-10-CM

## 2021-01-11 DIAGNOSIS — H61.21 IMPACTED CERUMEN OF RIGHT EAR: ICD-10-CM

## 2021-01-11 LAB
HCV AB SERPL QL IA: NONREACTIVE
HGB BLD-MCNC: 13.4 G/DL (ref 11.7–15.7)
HIV 1+2 AB+HIV1 P24 AG SERPL QL IA: NONREACTIVE

## 2021-01-11 PROCEDURE — 36415 COLL VENOUS BLD VENIPUNCTURE: CPT | Performed by: FAMILY MEDICINE

## 2021-01-11 PROCEDURE — 86803 HEPATITIS C AB TEST: CPT | Performed by: FAMILY MEDICINE

## 2021-01-11 PROCEDURE — 80053 COMPREHEN METABOLIC PANEL: CPT | Performed by: FAMILY MEDICINE

## 2021-01-11 PROCEDURE — 84443 ASSAY THYROID STIM HORMONE: CPT | Performed by: FAMILY MEDICINE

## 2021-01-11 PROCEDURE — 99396 PREV VISIT EST AGE 40-64: CPT | Performed by: FAMILY MEDICINE

## 2021-01-11 PROCEDURE — 80061 LIPID PANEL: CPT | Performed by: FAMILY MEDICINE

## 2021-01-11 PROCEDURE — 99213 OFFICE O/P EST LOW 20 MIN: CPT | Mod: 25 | Performed by: FAMILY MEDICINE

## 2021-01-11 PROCEDURE — 87389 HIV-1 AG W/HIV-1&-2 AB AG IA: CPT | Performed by: FAMILY MEDICINE

## 2021-01-11 PROCEDURE — 85018 HEMOGLOBIN: CPT | Performed by: FAMILY MEDICINE

## 2021-01-11 RX ORDER — FLUTICASONE PROPIONATE 50 MCG
1 SPRAY, SUSPENSION (ML) NASAL DAILY
COMMUNITY

## 2021-01-11 ASSESSMENT — ENCOUNTER SYMPTOMS
ARTHRALGIAS: 0
NAUSEA: 0
PALPITATIONS: 0
HEMATOCHEZIA: 0
CONSTIPATION: 0
DIZZINESS: 0
PARESTHESIAS: 0
ABDOMINAL PAIN: 0
HEARTBURN: 0
HEADACHES: 0
MYALGIAS: 0
EYE PAIN: 0
JOINT SWELLING: 0
BREAST MASS: 0
FEVER: 0
NERVOUS/ANXIOUS: 0
DYSURIA: 0
CHILLS: 0
SHORTNESS OF BREATH: 0
COUGH: 0
SORE THROAT: 0
WEAKNESS: 0
DIARRHEA: 0
HEMATURIA: 0
FREQUENCY: 0

## 2021-01-11 ASSESSMENT — MIFFLIN-ST. JEOR: SCORE: 1608.17

## 2021-01-11 NOTE — PROGRESS NOTES
SUBJECTIVE:   CC: Sharon Waters is an 45 year old woman who presents for preventive health visit.         Patient has been advised of split billing requirements and indicates understanding: Yes  Healthy Habits:     Getting at least 3 servings of Calcium per day:  Yes    Bi-annual eye exam:  Yes    Dental care twice a year:  Yes    Sleep apnea or symptoms of sleep apnea:  Excessive snoring    Diet:  Regular (no restrictions)    Frequency of exercise:  4-5 days/week    Duration of exercise:  30-45 minutes    Taking medications regularly:  Yes    Medication side effects:  Not applicable    PHQ-2 Total Score: 0    Additional concerns today:  Yes    Patient is fasting since 8:30pm last night.   Has not had a physical in a while.   Needing some referrals.   Right hand carpal tunnel. Diagnosed few years ago, she had injections and EMG done, she was advised surgery but declined. She has managed the pain, but really bad pain after gardening and cooking, pain for a while, Left is not as bad, she has worsening pain now.  She is interested in getting an injection.   She gets numbness in pointer and ring and sometimes in thumb. Wrist brace every night.     Sometimes it happens in the left hand, EMG on both wrists were showing carple tunnel, but infection helped    Snoring at night according to . She has tried nasal strips with helped a bit. She feels like her soft palette gets in the way sometimes.   Inside of ear very itchy. Other parts of ear also itchy,.   Snoring for 2 ears, no daytime sleepiness, not sure apenic moments      IUD mirena, no real periods    No lipids to review, on her previous bmo 99 was sugar  She had lost weight and then it was fine  She is now working with keeping her weight down  She is eating more sweets      She is working from home, she works for the [employer]  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 Yes        Social History     Tobacco Use     Smoking status: Never Smoker     Smokeless tobacco: Never Used   Substance Use Topics     Alcohol use: Yes     Comment: 1-2 per week         Alcohol Use 2021   Prescreen: >3 drinks/day or >7 drinks/week? No       Reviewed orders with patient.  Reviewed health maintenance and updated orders accordingly - Yes  BP Readings from Last 3 Encounters:   21 118/72   18 121/72   18 109/77    Wt Readings from Last 3 Encounters:   21 90.4 kg (199 lb 4 oz)   18 85.6 kg (188 lb 12.8 oz)   18 84.8 kg (187 lb)                    Mammogram Screening: Patient under age 50, mutual decision reflected in health maintenance.      Pertinent mammograms are reviewed under the imaging tab.  History of abnormal Pap smear: NO - age 30-65 PAP every 5 years with negative HPV co-testing recommended  PAP / HPV Latest Ref Rng & Units 2018   PAP - NIL NIL   HPV 16 DNA NEG:Negative Negative Negative   HPV 18 DNA NEG:Negative Negative Negative   OTHER HR HPV NEG:Negative Negative Negative     Reviewed and updated as needed this visit by clinical staff  Tobacco  Allergies  Meds              Reviewed and updated as needed this visit by Provider                Past Medical History:   Diagnosis Date     NO ACTIVE PROBLEMS      Uncomplicated asthma 1992    viral induced      Past Surgical History:   Procedure Laterality Date     NO HISTORY OF SURGERY       OB History    Para Term  AB Living   3 3 3 0 0 3   SAB TAB Ectopic Multiple Live  "Births   0 0 0 0 3      # Outcome Date GA Lbr Josh/2nd Weight Sex Delivery Anes PTL Lv   3 Term         YISSEL   2 Term         YISSEL   1 Term         YISSEL       Review of Systems   Constitutional: Negative for chills and fever.   HENT: Negative for congestion, ear pain, hearing loss and sore throat.    Eyes: Negative for pain and visual disturbance.   Respiratory: Negative for cough and shortness of breath.    Cardiovascular: Negative for chest pain, palpitations and peripheral edema.   Gastrointestinal: Negative for abdominal pain, constipation, diarrhea, heartburn, hematochezia and nausea.   Breasts:  Negative for tenderness, breast mass and discharge.   Genitourinary: Negative for dysuria, frequency, genital sores, hematuria, pelvic pain, urgency, vaginal bleeding and vaginal discharge.   Musculoskeletal: Negative for arthralgias, joint swelling and myalgias.   Skin: Negative for rash.   Neurological: Negative for dizziness, weakness, headaches and paresthesias.   Psychiatric/Behavioral: Negative for mood changes. The patient is not nervous/anxious.      CONSTITUTIONAL: NEGATIVE for fever, chills, change in weight  INTEGUMENTARU/SKIN: NEGATIVE for worrisome rashes, moles or lesions  EYES: NEGATIVE for vision changes or irritation  ENT: NEGATIVE for ear, mouth and throat problems  RESP: NEGATIVE for significant cough or SOB  BREAST: NEGATIVE for masses, tenderness or discharge  CV: NEGATIVE for chest pain, palpitations or peripheral edema  GI: NEGATIVE for nausea, abdominal pain, heartburn, or change in bowel habits  : NEGATIVE for unusual urinary or vaginal symptoms. Periods are regular.  MUSCULOSKELETAL: NEGATIVE for significant arthralgias or myalgia  NEURO: NEGATIVE for weakness, dizziness or paresthesias  PSYCHIATRIC: NEGATIVE for changes in mood or affect     OBJECTIVE:   /72   Pulse 82   Ht 1.753 m (5' 9\")   Wt 90.4 kg (199 lb 4 oz)   SpO2 97%   BMI 29.42 kg/m    Physical Exam  GENERAL: " healthy, alert and no distress  EYES: Eyes grossly normal to inspection, PERRL and conjunctivae and sclerae normal  HENT: ear canals with minimal ear wax and TM's normal, nose and mouth without ulcers or lesions  NECK: no adenopathy, no asymmetry, masses, or scars and thyroid normal to palpation  RESP: lungs clear to auscultation - no rales, rhonchi or wheezes  BREAST: normal without masses, tenderness or nipple discharge and no palpable axillary masses or adenopathy  CV: regular rate and rhythm, normal S1 S2, no S3 or S4, no murmur, click or rub, no peripheral edema and peripheral pulses strong  ABDOMEN: soft, nontender, no hepatosplenomegaly, no masses and bowel sounds normal  MS: no gross musculoskeletal defects noted, no edema  SKIN: no suspicious lesions or rashes  NEURO: Normal strength and tone, mentation intact and speech normal  PSYCH: mentation appears normal, affect normal/bright    Diagnostic Test Results:  Labs reviewed in Epic    ASSESSMENT/PLAN:       ICD-10-CM    1. Routine general medical examination at a health care facility  Z00.00 Comprehensive metabolic panel     Hemoglobin   2. Carpal tunnel syndrome of right wrist  G56.01 Orthopedic & Spine  Referral     OFFICE/OUTPT VISIT,EST,LEVL IV   3. Screening for HIV (human immunodeficiency virus)  Z11.4 HIV Antigen Antibody Combo   4. Encounter for IUD removal and reinsertion  Z30.433    5. Need for hepatitis C screening test  Z11.59 Hepatitis C Screen Reflex to HCV RNA Quant and Genotype   6. Screening for hyperlipidemia  Z13.220    7. Lipid screening  Z13.220 Lipid panel reflex to direct LDL Fasting     Comprehensive metabolic panel   8. Screening for thyroid disorder  Z13.29 SLEEP EVALUATION & MANAGEMENT REFERRAL - [facility]  [phone] (Age 15 and up)     Comprehensive metabolic panel     TSH with free T4 reflex   9. Snoring  R06.83 Comprehensive metabolic panel     OFFICE/OUTPT VISIT,EST,LEVL IV   10.  "Ear itching  L29.9 carbamide peroxide (DEBROX) 6.5 % otic solution     OFFICE/OUTPT VISIT,EST,LEVL IV   11. Impacted cerumen of right ear  H61.21 carbamide peroxide (DEBROX) 6.5 % otic solution     OFFICE/OUTPT VISIT,EST,LEVL IV     New to this provider    Itching ears, minimal ear wax, Use debrox for ear to help ear wax-monitor itching, Consider zyrtec since she has seasonal allergies she uses Flonase for.     carpal tunnel -managing with brace and injection in the past, now worsening bilaterally, advised follow up with Ortho for wrist pain/carple tunnel    Snoring-Sleep study referral placed    BMI 29-advised lifestyle changes and dietary changes    Check labs today    Consider colon cancer screening  Mammogram annually as advised  Follow up in one year  Start vit D supplements    Patient has been advised of split billing requirements and indicates understanding: Yes  COUNSELING:  Reviewed preventive health counseling, as reflected in patient instructions    Estimated body mass index is 29.42 kg/m  as calculated from the following:    Height as of this encounter: 1.753 m (5' 9\").    Weight as of this encounter: 90.4 kg (199 lb 4 oz).    Weight management plan: Discussed healthy diet and exercise guidelines    She reports that she has never smoked. She has never used smokeless tobacco.      Counseling Resources:  ATP IV Guidelines  Pooled Cohorts Equation Calculator  Breast Cancer Risk Calculator  BRCA-Related Cancer Risk Assessment: FHS-7 Tool  FRAX Risk Assessment  ICSI Preventive Guidelines  Dietary Guidelines for Americans, 2010  USDA's MyPlate  ASA Prophylaxis  Lung CA Screening    DO WANDER [facility]  "

## 2021-01-11 NOTE — PATIENT INSTRUCTIONS
Get labs done  Use debrox for ear to help ear wax-monitor itching  Consider zyrtec  Ortho for wrist pain/carple tunnel  Sleep study as planned  Consider colon cancer screening  Mammogram annually as advised  Follow up in one year  Start vit D supplements  Derrick Senior D.O.    Mammogram Scheduling  [place] [phone] or [phone]  [place] [phone]      Patient Education         Preventive Health Recommendations  Female Ages 40 to 49    Yearly exam:     See your health care provider every year in order to  1. Review health changes.   2. Discuss preventive care.    3. Review your medicines if your doctor prescribed any.      Get a Pap test every three years (unless you have an abnormal result and your provider advises testing more often).      If you get Pap tests with HPV test, you only need to test every 5 years, unless you have an abnormal result. You do not need a Pap test if your uterus was removed (hysterectomy) and you have not had cancer.      You should be tested each year for STDs (sexually transmitted diseases), if you're at risk.     Ask your doctor if you should have a mammogram.      Have a colonoscopy (test for colon cancer) if someone in your family has had colon cancer or polyps before age 50.       Have a cholesterol test every 5 years.       Have a diabetes test (fasting glucose) after age 45. If you are at risk for diabetes, you should have this test every 3 years.    Shots: Get a flu shot each year. Get a tetanus shot every 10 years.     Nutrition:     Eat at least 5 servings of fruits and vegetables each day.    Eat whole-grain bread, whole-wheat pasta and brown rice instead of white grains and rice.    Get adequate Calcium and Vitamin D.      Lifestyle    Exercise at least 150 minutes a week (an average of 30 minutes a day, 5 days a week). This will help you control your weight and prevent disease.    Limit alcohol to one drink per day.    No smoking.     Wear sunscreen to  prevent skin cancer.    See your dentist every six months for an exam and cleaning.

## 2021-01-12 LAB
ALBUMIN SERPL-MCNC: 3.8 G/DL (ref 3.4–5)
ALP SERPL-CCNC: 68 U/L (ref 40–150)
ALT SERPL W P-5'-P-CCNC: 25 U/L (ref 0–50)
ANION GAP SERPL CALCULATED.3IONS-SCNC: 5 MMOL/L (ref 3–14)
AST SERPL W P-5'-P-CCNC: 12 U/L (ref 0–45)
BILIRUB SERPL-MCNC: 0.3 MG/DL (ref 0.2–1.3)
BUN SERPL-MCNC: 20 MG/DL (ref 7–30)
CALCIUM SERPL-MCNC: 8.4 MG/DL (ref 8.5–10.1)
CHLORIDE SERPL-SCNC: 110 MMOL/L (ref 94–109)
CHOLEST SERPL-MCNC: 162 MG/DL
CO2 SERPL-SCNC: 25 MMOL/L (ref 20–32)
CREAT SERPL-MCNC: 0.94 MG/DL (ref 0.52–1.04)
GFR SERPL CREATININE-BSD FRML MDRD: 72 ML/MIN/{1.73_M2}
GLUCOSE SERPL-MCNC: 93 MG/DL (ref 70–99)
HDLC SERPL-MCNC: 52 MG/DL
LDLC SERPL CALC-MCNC: 101 MG/DL
NONHDLC SERPL-MCNC: 110 MG/DL
POTASSIUM SERPL-SCNC: 4.5 MMOL/L (ref 3.4–5.3)
PROT SERPL-MCNC: 6.8 G/DL (ref 6.8–8.8)
SODIUM SERPL-SCNC: 140 MMOL/L (ref 133–144)
TRIGL SERPL-MCNC: 44 MG/DL
TSH SERPL DL<=0.005 MIU/L-ACNC: 3.7 MU/L (ref 0.4–4)

## 2021-01-18 ENCOUNTER — OFFICE VISIT (OUTPATIENT)
Dept: ORTHOPEDICS | Facility: CLINIC | Age: 46
End: 2021-01-18
Attending: FAMILY MEDICINE
Payer: COMMERCIAL

## 2021-01-18 ENCOUNTER — MYC MEDICAL ADVICE (OUTPATIENT)
Dept: FAMILY MEDICINE | Facility: CLINIC | Age: 46
End: 2021-01-18

## 2021-01-18 VITALS
BODY MASS INDEX: 29.47 KG/M2 | HEART RATE: 80 BPM | DIASTOLIC BLOOD PRESSURE: 88 MMHG | RESPIRATION RATE: 14 BRPM | SYSTOLIC BLOOD PRESSURE: 131 MMHG | HEIGHT: 69 IN | WEIGHT: 199 LBS

## 2021-01-18 DIAGNOSIS — G56.03 BILATERAL CARPAL TUNNEL SYNDROME: Primary | ICD-10-CM

## 2021-01-18 DIAGNOSIS — G56.01 CARPAL TUNNEL SYNDROME OF RIGHT WRIST: ICD-10-CM

## 2021-01-18 DIAGNOSIS — Z12.11 SPECIAL SCREENING FOR MALIGNANT NEOPLASMS, COLON: ICD-10-CM

## 2021-01-18 DIAGNOSIS — Z80.0 FAMILY HISTORY OF COLON CANCER: Primary | ICD-10-CM

## 2021-01-18 PROCEDURE — 99243 OFF/OP CNSLTJ NEW/EST LOW 30: CPT | Performed by: ORTHOPAEDIC SURGERY

## 2021-01-18 ASSESSMENT — MIFFLIN-ST. JEOR: SCORE: 1607.04

## 2021-01-18 NOTE — LETTER
1/18/2021         RE: Sharon Waters  [address] [address]        Dear Colleague,    Thank you for referring your patient, Sharon Waters, to the [facility]. Please see a copy of my visit note below.    Sharon Waters is a 45 year old female who is seen in consultation at the request of Dr. Derrick Senior  for complaint of numbness of lateral aspect of bilateral hand, especially with use of the hand and at night. Right is worse than left.  Pain is sharp, shooting.    She has had symptoms for 12 years.    Small finger is not involved.  Prior treatment used: Wrist splint - yes-has used them intermittently at night for a decade.   NSAID: - yes-ibuprofen as needed.    Employment: . This is not work related.      PAST MEDICAL HISTORY:  Diabetes mellitus negative, hypothyroid negative.    EMG has been performed.  In 2009, but not available.  She recalls it indicating carpal tunnel syndrome.  She had steroid injection then.    Past Medical History:   Diagnosis Date     NO ACTIVE PROBLEMS      Uncomplicated asthma 1992    viral induced       Past Surgical History:   Procedure Laterality Date     NO HISTORY OF SURGERY         Family History   Problem Relation Age of Onset     Hypertension Mother      Colon Cancer Maternal Grandmother      Diabetes Paternal Grandmother      Breast Cancer Paternal Grandmother        Social History     Socioeconomic History     Marital status:      Spouse name: Not on file     Number of children: Not on file     Years of education: Not on file     Highest education level: Not on file   Occupational History     Not on file   Social Needs     Financial resource strain: Not on file     Food insecurity     Worry: Not on file     Inability: Not on file     Transportation needs     Medical: Not on file     Non-medical: Not on file   Tobacco Use     Smoking status: Never Smoker     Smokeless tobacco:  Never Used   Substance and Sexual Activity     Alcohol use: Yes     Comment: 1-2 per week     Drug use: No     Sexual activity: Yes     Partners: Male     Birth control/protection: I.U.D.   Lifestyle     Physical activity     Days per week: Not on file     Minutes per session: Not on file     Stress: Not on file   Relationships     Social connections     Talks on phone: Not on file     Gets together: Not on file     Attends Taoist service: Not on file     Active member of club or organization: Not on file     Attends meetings of clubs or organizations: Not on file     Relationship status: Not on file     Intimate partner violence     Fear of current or ex partner: Not on file     Emotionally abused: Not on file     Physically abused: Not on file     Forced sexual activity: Not on file   Other Topics Concern     Parent/sibling w/ CABG, MI or angioplasty before 65F 55M? No   Social History Narrative     Not on file       Current Outpatient Medications   Medication Sig Dispense Refill     carbamide peroxide (DEBROX) 6.5 % otic solution Place 5 drops into both ears 2 times daily 15 mL 1     fluticasone (FLONASE) 50 MCG/ACT nasal spray Spray 1 spray into both nostrils daily Uses seasonally       levonorgestrel (MIRENA) 20 MCG/24HR IUD 1 each by Intrauterine route once       Multiple Vitamins-Minerals (ADULT ONE DAILY GUMMIES PO)          Allergies   Allergen Reactions     Seasonal Allergies        REVIEW OF SYSTEMS:  CONSTITUTIONAL:  NEGATIVE for fever, chills, change in weight, not feeling tired  SKIN:  NEGATIVE for worrisome rashes, no skin lumps, no skin ulcers and no non-healing wounds  EYES:  NEGATIVE for vision changes or irritation.  ENT/MOUTH:  NEGATIVE.  No hearing loss, no hoarseness, no difficulty swallowing.  RESP:  NEGATIVE. No cough or shortness of breath.  BREAST:  NEGATIVE for masses, tenderness or discharge  CV:  NEGATIVE for chest pain, palpitations or peripheral edema  GI:  NEGATIVE for nausea,  "abdominal pain, heartburn, or change in bowel habits  :  Negative. No dysuria, no hematuria  MUSCULOSKELETAL:  See HPI above  NEURO:  NEGATIVE . No headaches, no dizziness,  no numbness  ENDOCRINE:  NEGATIVE for temperature intolerance, skin/hair changes  HEME/ALLERGY/IMMUNE:  NEGATIVE for bleeding problems  PSYCHIATRIC:  NEGATIVE. no anxiety, no depression.          O: She appears well,   Vitals: /88   Pulse 80   Resp 14   Ht 1.753 m (5' 9\")   Wt 90.3 kg (199 lb)   BMI 29.39 kg/m    BMI= Body mass index is 29.39 kg/m .   Cervical spine has full range of motion without pain.  Full range of motion of shoulder, elbows, wrists and fingers.  Hand exam revealed     Right: reduced sensation along median nerve distribution, negative Phalen's maneuver, negative Tinel's sign, no thenar atrophy, no weakness of thumb/pinky pincer grasp.  Left: reduced sensation along median nerve distribution, negative Phalen's maneuver, negative Tinel's sign, no thenar atrophy, no weakness of thumb/pinky pincer grasp   strength: Right normal, Left normal.      A: probable Bilateral carpal tunnel syndrome, with numbness and pain not improved with conservative treatment for 12 years.  Exam is not classic, so I recommend EMG bilaterally..      P: Explanation of median nerve entrapment is given.  The treatment spectrum is discussed, including conservative treatment with night splint, NSAID, activity modification, and possible surgical release if the symptoms are persistent and severe.     Bilateral EMG ordered.  Return to clinic after EMG.        Again, thank you for allowing me to participate in the care of your patient.        Sincerely,        Ke Guzman MD    "

## 2021-01-18 NOTE — PROGRESS NOTES
Sharon Waters is a 45 year old female who is seen in consultation at the request of Dr. Derrick Senior  for complaint of numbness of lateral aspect of bilateral hand, especially with use of the hand and at night. Right is worse than left.  Pain is sharp, shooting.    She has had symptoms for 12 years.    Small finger is not involved.  Prior treatment used: Wrist splint - yes-has used them intermittently at night for a decade.   NSAID: - yes-ibuprofen as needed.    Employment: . This is not work related.      PAST MEDICAL HISTORY:  Diabetes mellitus negative, hypothyroid negative.    EMG has been performed.  In 2009, but not available.  She recalls it indicating carpal tunnel syndrome.  She had steroid injection then.    Past Medical History:   Diagnosis Date     NO ACTIVE PROBLEMS      Uncomplicated asthma 1992    viral induced       Past Surgical History:   Procedure Laterality Date     NO HISTORY OF SURGERY         Family History   Problem Relation Age of Onset     Hypertension Mother      Colon Cancer Maternal Grandmother      Diabetes Paternal Grandmother      Breast Cancer Paternal Grandmother        Social History     Socioeconomic History     Marital status:      Spouse name: Not on file     Number of children: Not on file     Years of education: Not on file     Highest education level: Not on file   Occupational History     Not on file   Social Needs     Financial resource strain: Not on file     Food insecurity     Worry: Not on file     Inability: Not on file     Transportation needs     Medical: Not on file     Non-medical: Not on file   Tobacco Use     Smoking status: Never Smoker     Smokeless tobacco: Never Used   Substance and Sexual Activity     Alcohol use: Yes     Comment: 1-2 per week     Drug use: No     Sexual activity: Yes     Partners: Male     Birth control/protection: I.U.D.   Lifestyle     Physical activity     Days per week: Not on file     Minutes per session:  Not on file     Stress: Not on file   Relationships     Social connections     Talks on phone: Not on file     Gets together: Not on file     Attends Nondenominational service: Not on file     Active member of club or organization: Not on file     Attends meetings of clubs or organizations: Not on file     Relationship status: Not on file     Intimate partner violence     Fear of current or ex partner: Not on file     Emotionally abused: Not on file     Physically abused: Not on file     Forced sexual activity: Not on file   Other Topics Concern     Parent/sibling w/ CABG, MI or angioplasty before 65F 55M? No   Social History Narrative     Not on file       Current Outpatient Medications   Medication Sig Dispense Refill     carbamide peroxide (DEBROX) 6.5 % otic solution Place 5 drops into both ears 2 times daily 15 mL 1     fluticasone (FLONASE) 50 MCG/ACT nasal spray Spray 1 spray into both nostrils daily Uses seasonally       levonorgestrel (MIRENA) 20 MCG/24HR IUD 1 each by Intrauterine route once       Multiple Vitamins-Minerals (ADULT ONE DAILY GUMMIES PO)          Allergies   Allergen Reactions     Seasonal Allergies        REVIEW OF SYSTEMS:  CONSTITUTIONAL:  NEGATIVE for fever, chills, change in weight, not feeling tired  SKIN:  NEGATIVE for worrisome rashes, no skin lumps, no skin ulcers and no non-healing wounds  EYES:  NEGATIVE for vision changes or irritation.  ENT/MOUTH:  NEGATIVE.  No hearing loss, no hoarseness, no difficulty swallowing.  RESP:  NEGATIVE. No cough or shortness of breath.  BREAST:  NEGATIVE for masses, tenderness or discharge  CV:  NEGATIVE for chest pain, palpitations or peripheral edema  GI:  NEGATIVE for nausea, abdominal pain, heartburn, or change in bowel habits  :  Negative. No dysuria, no hematuria  MUSCULOSKELETAL:  See HPI above  NEURO:  NEGATIVE . No headaches, no dizziness,  no numbness  ENDOCRINE:  NEGATIVE for temperature intolerance, skin/hair changes  HEME/ALLERGY/IMMUNE:   "NEGATIVE for bleeding problems  PSYCHIATRIC:  NEGATIVE. no anxiety, no depression.          O: She appears well,   Vitals: /88   Pulse 80   Resp 14   Ht 1.753 m (5' 9\")   Wt 90.3 kg (199 lb)   BMI 29.39 kg/m    BMI= Body mass index is 29.39 kg/m .   Cervical spine has full range of motion without pain.  Full range of motion of shoulder, elbows, wrists and fingers.  Hand exam revealed     Right: reduced sensation along median nerve distribution, negative Phalen's maneuver, negative Tinel's sign, no thenar atrophy, no weakness of thumb/pinky pincer grasp.  Left: reduced sensation along median nerve distribution, negative Phalen's maneuver, negative Tinel's sign, no thenar atrophy, no weakness of thumb/pinky pincer grasp   strength: Right normal, Left normal.      A: probable Bilateral carpal tunnel syndrome, with numbness and pain not improved with conservative treatment for 12 years.  Exam is not classic, so I recommend EMG bilaterally..      P: Explanation of median nerve entrapment is given.  The treatment spectrum is discussed, including conservative treatment with night splint, NSAID, activity modification, and possible surgical release if the symptoms are persistent and severe.     Bilateral EMG ordered.  Return to clinic after EMG.    "

## 2021-01-18 NOTE — RESULT ENCOUNTER NOTE
All your results are essentially normal, your calcium is minimally lower than normal, increase calcium rich foods recheck next year.. Please contact me if you have any questions.  Take care,  Derrick Senior D.O.

## 2021-01-19 NOTE — TELEPHONE ENCOUNTER
Routing [employer] message to PCP    Last office visit 1/11/21 suggested early screening -pended      Barbara Belcher RN

## 2021-01-22 ENCOUNTER — TELEPHONE (OUTPATIENT)
Dept: GASTROENTEROLOGY | Facility: CLINIC | Age: 46
End: 2021-01-22

## 2021-01-22 ENCOUNTER — MYC MEDICAL ADVICE (OUTPATIENT)
Dept: ORTHOPEDICS | Facility: CLINIC | Age: 46
End: 2021-01-22

## 2021-01-22 NOTE — TELEPHONE ENCOUNTER
Patient is scheduled for COLONOSCOPY with Dr. MAHONEY    Spoke with: SUNSHINE    Date of Procedure: 03/05/2021    Location: [facility]    Sedation Type CS    Pre-op for Unit J MAC NOT NEEDED    (if yes advise patient they will need a pre-op prior to procedure)      Is patient on blood thinners? -NO (If yes- inform patient to follow up with PCP or provider for follow up instructions)     Informed patient they will need an adult  YES    Informed Patient of COVID Test Requirement YES    Preferred Pharmacy for Pre Prescription NA    Confirmed Nurse will call to complete assessment YES    Additional comments: NA

## 2021-01-25 ENCOUNTER — MYC MEDICAL ADVICE (OUTPATIENT)
Dept: FAMILY MEDICINE | Facility: CLINIC | Age: 46
End: 2021-01-25

## 2021-01-26 NOTE — TELEPHONE ENCOUNTER
Forms received from [place] [employer] Form for Hellina Parrish DO.  Forms placed in provider 'sign me' folder.  Please fax forms to [phone] after completion.    Racquel Dudley

## 2021-01-27 ENCOUNTER — VIRTUAL VISIT (OUTPATIENT)
Dept: SLEEP MEDICINE | Facility: CLINIC | Age: 46
End: 2021-01-27
Attending: FAMILY MEDICINE
Payer: COMMERCIAL

## 2021-01-27 VITALS — BODY MASS INDEX: 29.33 KG/M2 | WEIGHT: 198 LBS | HEIGHT: 69 IN

## 2021-01-27 DIAGNOSIS — R53.81 MALAISE AND FATIGUE: ICD-10-CM

## 2021-01-27 DIAGNOSIS — R06.00 DYSPNEA AND RESPIRATORY ABNORMALITY: Primary | ICD-10-CM

## 2021-01-27 DIAGNOSIS — R53.83 MALAISE AND FATIGUE: ICD-10-CM

## 2021-01-27 DIAGNOSIS — R06.89 DYSPNEA AND RESPIRATORY ABNORMALITY: Primary | ICD-10-CM

## 2021-01-27 DIAGNOSIS — Z72.820 LACK OF ADEQUATE SLEEP: ICD-10-CM

## 2021-01-27 PROCEDURE — 99203 OFFICE O/P NEW LOW 30 MIN: CPT | Mod: 95 | Performed by: PHYSICIAN ASSISTANT

## 2021-01-27 ASSESSMENT — MIFFLIN-ST. JEOR: SCORE: 1602.5

## 2021-01-27 NOTE — PATIENT INSTRUCTIONS
Your BMI is Body mass index is 29.24 kg/m .  Weight management is a personal decision.  If you are interested in exploring weight loss strategies, the following discussion covers the approaches that may be successful. Body mass index (BMI) is one way to tell whether you are at a healthy weight, overweight, or obese. It measures your weight in relation to your height.  A BMI of 18.5 to 24.9 is in the healthy range. A person with a BMI of 25 to 29.9 is considered overweight, and someone with a BMI of 30 or greater is considered obese. More than two-thirds of American adults are considered overweight or obese.  Being overweight or obese increases the risk for further weight gain. Excess weight may lead to heart disease and diabetes.  Creating and following plans for healthy eating and physical activity may help you improve your health.  Weight control is part of healthy lifestyle and includes exercise, emotional health, and healthy eating habits. Careful eating habits lifelong are the mainstay of weight control. Though there are significant health benefits from weight loss, long-term weight loss with diet alone may be very difficult to achieve- studies show long-term success with dietary management in less than 10% of people. Attaining a healthy weight may be especially difficult to achieve in those with severe obesity. In some cases, medications, devices and surgical management might be considered.  What can you do?  If you are overweight or obese and are interested in methods for weight loss, you should discuss this with your provider.     Consider reducing daily calorie intake by 500 calories.     Keep a food journal.     Avoiding skipping meals, consider cutting portions instead.    Diet combined with exercise helps maintain muscle while optimizing fat loss. Strength training is particularly important for building and maintaining muscle mass. Exercise helps reduce stress, increase energy, and improves fitness.  Increasing exercise without diet control, however, may not burn enough calories to loose weight.       Start walking three days a week 10-20 minutes at a time    Work towards walking thirty minutes five days a week     Eventually, increase the speed of your walking for 1-2 minutes at time    In addition, we recommend that you review healthy lifestyles and methods for weight loss available through the National Institutes of Health patient information sites:  http://win.niddk.nih.gov/publications/index.htm    And look into health and wellness programs that may be available through your health insurance provider, employer, local community center, or allen club.    Weight management plan: Patient was referred to their PCP to discuss a diet and exercise plan.   MY CONTACT NUMBERS ARE:   DOCTOR : TIMO Hunt  SLEEP CENTER :   CPAP EQUIPMENT :    IF I HAVE SLEEP APNEA.....  WHERE CAN I FIND MORE INFORMATION?    American Academy of Sleep Medicine Patient information on sleep disorders:  http://yoursleep.aasmnet.org    THINGS TO REMEMBER  In most situations, sleep apnea is a lifelong disease that must be managed with daily therapy. Untreated disease, when severe, may result in an increased risk for an array of problems from heart disease to mood changes, car accidents and shorter lifespan.    CPAP -  WHY AND HOW?  Continuous positive airway pressure, or CPAP, is the most effective treatment for obstructive sleep apnea. A decision to use CPAP is a major step forward in the pursuit of a healthier life. The successful use of CPAP will help you breathe easier, sleep better and live healthier. Using CPAP can be a positive experience if you keep these abebe points in mind:  1. Commitment  CPAP is not a quick fix for your problem. It involves a long-term commitment to improve your sleep and your health.    2. Communication  Stay in close communication with both your sleep doctor and your CPAP supplier. Ask lots of questions  "and seek help when you need it.    3. Consistency  Use CPAP all night, every night and for every nap. You will receive the maximum health benefits from CPAP when you use it every time that you sleep. This will also make it easier for your body to adjust to the treatment.    4. Correction  The first machine and mask that you try may not be the best ones for you. Work with your sleep doctor and your CPAP supplier to make corrections to your equipment selection. Ask about trying a different type of machine or mask if you have ongoing problems. Make sure that your mask is a good fit and learn to use your equipment properly.    5. Challenge  Tell a family member or close friend to ask you each morning if you used your CPAP the previous night. Have someone to challenge you to give it your best effort.    6. Connection   Your adjustment to CPAP will be easier if you are able to connect with others who use the same treatment. Ask your sleep doctor if there is a support group in your area for people who have sleep apnea, or look for one on the Internet.    7. Comfort   Increase your level of comfort by using a saline spray, decongestant or heated humidifier if CPAP irritates your nose, mouth or throat. Use your unit's \"ramp\" setting to slowly get used to the air pressure level. There may be soft pads you can buy that will fit over your mask straps. Look on www.CPAP.com for accessories such as these straps, a pillow contoured for side-sleeping with CPAP, longer hoses, hose covers to reduce condensation, or stands to keep the hose out of your way.                                 8. Cleaning   Clean your mask, tubing and headgear on a regular basis. Put this time in your schedule so that you don't forget to do it. Check and replace the filters for your CPAP unit and humidifier.    9. Completion   Although you are never finished with CPAP therapy, you should reward yourself by celebrating the completion of your first month of " treatment. Expect this first month to be your hardest period of adjustment. It will involve some trial and error as you find the machine, mask and pressure settings that are right for you.    Continuation  After your first month of treatment, continue to make a daily commitment to use your CPAP all night, every night and for every nap.    CPAP-Tips to starting with success:  Begin using your CPAP for short periods of time during the day while you watch TV or read.    Use CPAP every night and for every nap. Using it less often reduces the health benefits and makes it harder for your body to get used to it.    Newer CPAP models are virtually silent; however, if you find the sound of your CPAP machine to be bothersome, place the unit under your bed to dampen the sound.     Make small adjustments to your mask, tubing, straps and headgear until you get the right fit. Tightening the mask may actually worsen the leak.  If it leaves significant marks on your face or irritates the bridge of your nose, it may not be the best mask for you.  Speak with the person who supplied the mask and consider trying other masks.    Use a saline nasal spray to ease mild nasal congestion. Neti-Pot or saline nasal rinses may also help. Nasal gel sprays can help reduce nasal dryness.  Biotene mouthwash can be helpful to protect your teeth if you experience frequent dry mouth.  Dry mouth may be a sign of air escaping out of your mouth or out of the mask in the case of a full face mask.  Speak with your provider if you expect that is the case.     Take a nasal decongestant to relieve more severe nasal or sinus congestion.  Do not use Afrin (oxymetazoline) nasal spray more than 3 days in a row.  Speak with your sleep doctor if your nasal congestion is chronic.    Use a heated humidifier that fits your CPAP model to enhance your breathing comfort. Adjust the heat setting up if you get a dry nose or throat, down if you get condensation in the hose  or mask.  Position the CPAP lower than you so that any condensation in the hose drains back into the machine rather than towards the mask.    Try a system that uses nasal pillows if traditional masks give you problems.    Clean your mask, tubing and headgear once a week. Make sure the equipment dries fully.    Regularly check and replace the filters for your CPAP unit and humidifier.    Work closely with your sleep doctor and your CPAP supplier to make sure that you have the machine, mask and air pressure setting that works best for you.    BESIDES CPAP, WHAT OTHER THERAPIES ARE THERE?    Postioning devices if you only have the problem on your back    Dental devices if your condition is mild    Nasal valves may be effective though experience is limited    Tongue Retaining Device if missing teeth precludes the use of a dental device    Weight loss if you are overweight    Surgery in limited cases where devices are not acceptable or there are problems with structures in the nose and throat  If treated with one of these alternative options, further evaluation is necessary to ensure that the therapy is effective. This may require some form of testing.     Healthy Lifestyle:  Healthy diet, exercise and Limit alcohol: Not only will excessive alcohol increase your weight over time, but it irritates the throat tissues and make them swell, shrinking the airway and causing snoring. Drinking alcohol should be limited and stopped within 3-4 hours before going to bed.   Stop smoking: (Red swollen throat, heat, nicotine), also irritates and swells the airway, among numerous other negative health consequences.    Positioning Device  This example shows a pillow that straps around the waist. It may be appropriate for those whose sleep study shows milder sleep apnea that occurs primarily when lying flat on one's back. Preliminary studies have shown benefit but effectiveness at home should be verified.    Nasal Valves               Nasal valves may not be effective if you have frequent nasal congestion or have difficulty breathing through your nose. They may be an option for mild apnea if other options are not well tolerated. The efficacy of these devices is generally less than CPAP or oral appliances.  Oral Appliance  These are examples of two of many custom-made devices that are more likely to work in mild sleep apnea  Oral appliances are dental mouth pieces that fit very much like a sports mouth guards or removable orthodontic retainers. They are used to treat snoring  And obstructive sleep apnea . The device prevents the airway from collapsing by either holding the tongue or supporting the jaw in a forward position. Since oral appliances are non-invasive and easy to use, they may be considered as an early treatment option. Oral appliance therapy (OAT) involves the customization, selection, fabrication, fitting, adjustments and long-term follow-up care of specially designed oral devices, worn during sleep, which reposition the lower jaw and tongue base forward to maintain an open airway.  Custom made oral appliances are proven to be more effective than over-the-counter devices. Therefore, the over-the-counter devices are recommended not to be used as a screening tool nor as a therapeutic option.  Who gets a dental device?  Oral appliance therapy can be used as an alternative to  CPAP therapy for the treatment of mild to moderate sleep apnea and for those patients who prefer OAT to CPAP. Oral appliance therapy is a first line therapy for the treatment of primary snoring. Additionally, OAT is an option for those that cannot tolerate CPAP as therapy or who have experienced insufficient surgical results.    Possible side effects?  Frequent but minor side effects include: excessive salivation, dry mouth, discomfort of teeth and jaw and temporary changes in the patient s bite.  Potential complications include: jaw pain, permanent occlusal  changes and TMJ symptoms.  The above mentioned side effects and complications can be recognized and managed by dentists trained in dental sleep medicine.    Finding a dentist that practices dental sleep medicine  Specific training is available through the American Academy of Dental Sleep Medicine for dentists interested in working in the field of sleep. To find a dentist who is educated in the field of sleep and the use of oral appliances, near you, visit the Web site of the American Academy of Dental Sleep Medicine; also see http://www.accpstorage.org/newOrganization/patients/oralAppliances.pdf   To search for a dentist certified in these practices:  Http://aadsm.org/FindADentist.aspx?1  Http://www.accpstorage.org/newOrganization/patients/oralAppliances.pdf    Tongue Retaining Device               Tongue Retaining Devices are devices that generally  suction cup  onto the tongue preventing it from falling back into the back of the throat during sleep.  They may be an option for people missing teeth, but can be uncomfortable. This particular device can be purchased online, but similar devices made by dentists fit more precisely and may be tolerated better. In general, they are rarely effective and often not very well tolerated.    Weight Loss:  Some patients may experience reduction or elimination of sleep apnea with weight loss.  Though there are significant health benefits from weight loss, long-term weight loss is very difficult to achieve- studies show success with dietary management in less that 10% of people.     If you are interested in dietary weight loss, you should review the options discussed at the National Institutes of Health patient information site:     Http:/www.health.nih.gov/topic/WeightLossDieting    Bariatric programs offer counseling in all methods of weight loss:    Http:/www.[facility].org/Specialties/WeightLossSurgeryandMedicalMgmt/htm    Surgery:  There are a number of surgeries that  "have been attempted to treat apnea. In general, surgical options are usually reserved for cases in which there is a physical abnormality contributing to obstruction or other treatment options are ineffective or not tolerated. Most surgical options are either unreliable or quite invasive. One of the more common procedures is:  Uvulopalatopharyngoplasty: In this procedure, the uvula (the finger-like tissue that hangs in the back of the throat), part of the soft palate (the tissue that the uvula is attached to), and sometimes the tonsils or adenoids are removed. The efficacy of this surgery is around 30-50% .  After surgery, complications may include:  Sleepiness and sleep apnea related to post-surgery medication   Swelling, infection and bleeding   A sore throat and/or difficulty swallowing   Drainage of secretions into the nose and a nasal quality to the voice. English language speech does not seem to be affected by this surgery.   Narrowing of the airway in the nose and throat (hence constricting breathing) snoring and even iatrogenically caused sleep apnea. By cutting the tissues, excess scar tissue can \"tighten\" the airway and make it even smaller than it was before UPPP.  Patients who have had the uvula removed will become unable to correctly speak Nigerian or any other language that has a uvular 'r' phoneme.    Surgeries to help resolve nasal congestion may help reduce the severity of apnea slightly. Nasal congestion does not cause apnea on its own, so these surgeries are usually not performed just for ТАТЬЯНА.  They may be worth considering if the nasal congestion is significantly bothersome independent of apnea.        "

## 2021-01-27 NOTE — PROGRESS NOTES
"Sharon is a 45 year old who is being evaluated via a billable video visit.      How would you like to obtain your AVS? MyChart  If the video visit is dropped, the invitation should be resent by: Text to cell phone: on file  Will anyone else be joining your video visit? No    Video Start Time: 8:00 AM     Video-Visit Details    Type of service:  Video Visit    Video End Time:8:24 AM. Telephone:16 minutes    Originating Location (pt. Location): Home    Distant Location (provider location):  [facility]     Platform used for Video Visit: AmWell and doximity      Sleep Consultation:    Date on this visit: 1/27/2021    Sharon Waters is sent by Derrick Senior for a sleep consultation regarding snoring.    Primary Physician: Derrick Senior     Chief Complaint   Patient presents with     Sleep Problem     \"I started snoring 2 years ago. It disturbs my partner's sleep and my own. Unsure if sleep apnea is involved.\"       Sharon goes to sleep at 11:30 PM during the week. She wakes up at 7:00 AM with an alarm. She falls asleep in 5 minutes.  Sharon denies difficulty falling asleep.  She wakes frequent arousals (4-5). On weekends, Sharon goes to sleep at 12:00 AM.  She wakes up at 8:00 AM with an alarm on Saturdays. She falls asleep in 5 minutes.  Patient gets an average of 7 hours of sleep per night.     Patient does not watch TV in bed. She uses electronics    [place] does not do shift work.  She is a  at the [employer]    Sharon does snore every night and snoring is very loud. Patient does have a regular bed partner. There is report of snoring.  She does not have witnessed apneas. They occasionally sleep separately.  Patient sleeps on her back, side and stomach. She has frequent irritated throat in the mornings. She reports some RLS symptoms that don't interfere with sleep. Sharon denies any bruxism, sleep walking, sleep talking and dream " enactment.    Sharon has difficulty breathing through her nose.      Sunshine has gained 30 pounds in 7 years.  Patient describes themself as a morning person. Patient's [place] Sleepiness score 10/24 consistent with some daytime sleepiness.      Sunshine naps 1-2 times per week for 30 minutes, feels refreshed after naps. She takes no inadvertant naps.  She denies falling asleep while driving. Patient was counseled on the importance of driving while alert, to pull over if drowsy, or nap before getting into the vehicle if sleepy.  She uses 3 cups/day of coffee. Last caffeine intake is usually before 3 PM.    Allergies:    Allergies   Allergen Reactions     Seasonal Allergies        Medications:    Current Outpatient Medications   Medication Sig Dispense Refill     Multiple Vitamins-Minerals (ADULT ONE DAILY GUMMIES PO)        carbamide peroxide (DEBROX) 6.5 % otic solution Place 5 drops into both ears 2 times daily 15 mL 1     fluticasone (FLONASE) 50 MCG/ACT nasal spray Spray 1 spray into both nostrils daily Uses seasonally       levonorgestrel (MIRENA) 20 MCG/24HR IUD 1 each by Intrauterine route once         Problem List:  Patient Active Problem List    Diagnosis Date Noted     Bilateral carpal tunnel syndrome 01/18/2021     Priority: Medium     Encounter for IUD removal and reinsertion 04/28/2015     Priority: Medium        Past Medical/Surgical History:  Past Medical History:   Diagnosis Date     Uncomplicated asthma 1992    viral induced     Past Surgical History:   Procedure Laterality Date     NO HISTORY OF SURGERY         Social History:  Social History     Socioeconomic History     Marital status:      Spouse name: Not on file     Number of children: Not on file     Years of education: Not on file     Highest education level: Not on file   Occupational History     Not on file   Social Needs     Financial resource strain: Not on file     Food insecurity     Worry: Not on file     Inability: Not on  "file     Transportation needs     Medical: Not on file     Non-medical: Not on file   Tobacco Use     Smoking status: Never Smoker     Smokeless tobacco: Never Used   Substance and Sexual Activity     Alcohol use: Yes     Comment: 1-2 per week     Drug use: No     Sexual activity: Yes     Partners: Male     Birth control/protection: I.U.D.   Lifestyle     Physical activity     Days per week: Not on file     Minutes per session: Not on file     Stress: Not on file   Relationships     Social connections     Talks on phone: Not on file     Gets together: Not on file     Attends Voodoo service: Not on file     Active member of club or organization: Not on file     Attends meetings of clubs or organizations: Not on file     Relationship status: Not on file     Intimate partner violence     Fear of current or ex partner: Not on file     Emotionally abused: Not on file     Physically abused: Not on file     Forced sexual activity: Not on file   Other Topics Concern     Parent/sibling w/ CABG, MI or angioplasty before 65F 55M? No   Social History Narrative     Not on file       Family History:  Family History   Problem Relation Age of Onset     Hypertension Mother      Colon Cancer Maternal Grandmother      Diabetes Paternal Grandmother      Breast Cancer Paternal Grandmother        Review of Systems:  A complete review of systems reviewed by me is negative with the exeption of what has been mentioned in the history of present illness.      Physical Examination:  Vitals: Ht 1.753 m (5' 9\")   Wt 89.8 kg (198 lb)   BMI 29.24 kg/m    BMI= Body mass index is 29.24 kg/m .         [place] Total Score 1/25/2021   Total score - [place] 10       RAHEEL Total Score: 4 (01/25/21 0700)    GENERAL APPEARANCE: alert and no distress  EYES: Eyes grossly normal to inspection  RESP: breathing is non-labored  PSYCH: mentation appears normal and affect normal/bright    Last Comprehensive Metabolic Panel:  Sodium   Date Value Ref Range Status "   01/11/2021 140 133 - 144 mmol/L Final     Potassium   Date Value Ref Range Status   01/11/2021 4.5 3.4 - 5.3 mmol/L Final     Chloride   Date Value Ref Range Status   01/11/2021 110 (H) 94 - 109 mmol/L Final     Carbon Dioxide   Date Value Ref Range Status   01/11/2021 25 20 - 32 mmol/L Final     Anion Gap   Date Value Ref Range Status   01/11/2021 5 3 - 14 mmol/L Final     Glucose   Date Value Ref Range Status   01/11/2021 93 70 - 99 mg/dL Final     Comment:     Fasting specimen     Urea Nitrogen   Date Value Ref Range Status   01/11/2021 20 7 - 30 mg/dL Final     Creatinine   Date Value Ref Range Status   01/11/2021 0.94 0.52 - 1.04 mg/dL Final     GFR Estimate   Date Value Ref Range Status   01/11/2021 72 >60 mL/min/[1.73_m2] Final     Comment:     Non  GFR Calc  Starting 12/18/2018, serum creatinine based estimated GFR (eGFR) will be   calculated using the Chronic Kidney Disease Epidemiology Collaboration   (CKD-EPI) equation.       Calcium   Date Value Ref Range Status   01/11/2021 8.4 (L) 8.5 - 10.1 mg/dL Final     TSH   Date Value Ref Range Status   01/11/2021 3.70 0.40 - 4.00 mU/L Final         Impression:  Patient has features and risk factors for possible obstructive sleep apnea including: loud snoring, daytime fatigue/sleepiness and frequent nocturnal arousals. The STOP-BANG score is 2/8.   The pathophysiology, diagnosis and treatment of ТАТЬЯНА was discussed.  Plan:    1. Polysomnogram (using 4% desaturation/Medicare/ AASM 1B scoring rules) to evaluate for obstructive sleep apnea. Patient is a poor candidate for Home Sleep Testing due to symptoms of ТАТЬЯНА but low pre-test probability of ТАТЬЯНА  (STOPBANG 0-2).  2. Recommend weight management.     Literature provided regarding sleep apnea.      She will follow up with me in approximately two weeks after her sleep study has been competed to review the results and discuss plan of care.       Polysomnography reviewed.  Obstructive sleep apnea  reviewed.  Complications of untreated sleep apnea were reviewed.    Negrito Rasheed PA-C    CC: Derrick Senior

## 2021-02-08 ENCOUNTER — THERAPY VISIT (OUTPATIENT)
Dept: SLEEP MEDICINE | Facility: CLINIC | Age: 46
End: 2021-02-08
Payer: COMMERCIAL

## 2021-02-08 DIAGNOSIS — R06.89 DYSPNEA AND RESPIRATORY ABNORMALITY: ICD-10-CM

## 2021-02-08 DIAGNOSIS — R06.00 DYSPNEA AND RESPIRATORY ABNORMALITY: ICD-10-CM

## 2021-02-08 DIAGNOSIS — Z72.820 LACK OF ADEQUATE SLEEP: ICD-10-CM

## 2021-02-08 DIAGNOSIS — R53.81 MALAISE AND FATIGUE: ICD-10-CM

## 2021-02-08 DIAGNOSIS — R53.83 MALAISE AND FATIGUE: ICD-10-CM

## 2021-02-08 PROCEDURE — 95810 POLYSOM 6/> YRS 4/> PARAM: CPT | Performed by: INTERNAL MEDICINE

## 2021-02-09 NOTE — PATIENT INSTRUCTIONS
[facility]    1. Your sleep study will be reviewed by a sleep physician within the next few days.     2. Please follow up in the sleep clinic as scheduled, or, make an appointment with your sleep provider to be seen within two weeks to discuss the results of the sleep study.    3. If you have any questions or problems with your treatment plan, please contact your sleep clinic provider at [phone] to further manage your condition.    4. Please review your attached medication list, and, at your follow-up appointment advise your sleep clinic provider about any changes.    5. Go to [email or web address] for more information about your sleep problems.    YOLIS Gonzalez  February 9, 2021

## 2021-02-12 LAB — SLPCOMP: NORMAL

## 2021-02-12 NOTE — PROCEDURES
" SLEEP STUDY INTERPRETATION  DIAGNOSTIC POLYSOMNOGRAPHY REPORT      Patient: ANTWON BLACK  YOB: 1975  Study Date: 2/8/2021  MRN: [id number]  Referring Provider: Negrito Rasheed PA-C  Ordering Provider: Negrito Rasheed PA-C    Indications for Polysomnography: The patient is a 45 year old Female who is 5' 9\" and weighs 198.0 lbs. Her BMI is 29.3, [place] sleepiness scale - and neck circumference is - cm. Relevant medical history includes asthma. A diagnostic polysomnogram was performed to evaluate for sleep apnea.    Polysomnogram Data: A full night polysomnogram recorded the standard physiologic parameters including EEG, EOG, EMG, ECG, nasal and oral airflow. Respiratory parameters of chest and abdominal movements were recorded with respiratory inductance plethysmography. Oxygen saturation was recorded by pulse oximetry. Hypopnea scoring rule used: 1B 4%.    Sleep Architecture: Fragmented sleep with prolonged sleep latency and reduced sleep efficiency.   The total recording time of the polysomnogram was 465.9 minutes. The total sleep time was 275.5 minutes. Sleep latency was increased at 53.5 minutes without the use of a sleep aid. REM latency was 336.5 minutes. Arousal index was increased at 33.5 arousals per hour. Sleep efficiency was decreased at 59.1%. Wake after sleep onset was 136.5 minutes. The patient spent 15.6% of total sleep time in Stage N1, 50.6% in Stage N2, 19.4% in Stage N3, and 14.3% in REM. Time in REM supine was - minutes.    Respiration: Negative for sleep apnea.     Events ? The polysomnogram revealed a presence of - obstructive, 1 central, and 2 mixed apneas resulting in an apnea index of 0.7 events per hour. There were - obstructive hypopneas and - central hypopneas resulting in an obstructive hypopnea index of - and central hypopnea index of - events per hour. The combined apnea/hypopnea index was 0.7 events per hour (central apnea/hypopnea index was 0.2 events per hour). The REM " AHI was 4.6 events per hour. The supine AHI was - events per hour. The RERA index was - events per hour.  The RDI was 0.7 events per hour.    Snoring - was reported as loud.    Respiratory rate and pattern - was notable for normal respiratory rate and pattern.    Sustained Sleep Associated Hypoventilation - Transcutaneous carbon dioxide monitoring was not used, however significant hypoventilation was not suggested by oximetry.    Sleep Associated Hypoxemia - (Greater than 5 minutes O2 sat at or below 88%) was not present. Baseline oxygen saturation was 94.5%. Lowest oxygen saturation was 79.6%. Time spent less than or equal to 88% was 0 minutes. Time spent less than or equal to 89% was 0 minutes.    Movement Activity: Mildly increased periodic limb movement activity without a significant degree of associated arousals.     Periodic Limb Activity - There were 81 PLMs during the entire study. The PLM index was 17.6 movements per hour. The PLM Arousal Index was 4.1 per hour.    REM EMG Activity - Excessive transient/sustained muscle activity was not present.    Nocturnal Behavior - Abnormal sleep related behaviors were not noted during/arising out of NREM / REM sleep.     Bruxism - None apparent.    Cardiac Summary: Sinus rhythm.   The average pulse rate was 70.7 bpm. The minimum pulse rate was 52.0 bpm while the maximum pulse rate was 125.3 bpm.  Arrhythmias were not noted.    Assessment:     This sleep study is negative for sleep apnea. Supine REM was not obtained but there was no clinically significant sleep disordered breathing in lateral REM or supine NREM sleep.     Sleep efficiency was reduced with prolonged sleep latency and wake periods after sleep onset. A mildly increased frequency of spontaneous arousals was noted.     There was a mildly increased frequency of periodic limb movements of sleep without a significant degree of associated arousals.     Recommendations:    Suggest optimizing sleep schedule and  avoiding sleep deprivation.    Weight management.    Pharmacologic therapy should be used for management of restless legs syndrome only if present and clinically indicated and not based on the presence of periodic limb movements alone.    Diagnostic Codes:   Repetitive Intrusions Into Sleep F51.8    2/8/2021 [place] [facility] Study (198.0 lbs) - AHI 0.7, RDI 0.7, Supine AHI -, REM AHI 4.6, Low O2 79.6%, Time Spent ?88% 0 minutes / Time Spent ?89% 0 minutes.    _____________________________________   Electronically Signed By: Edison Espinoza MD 02/11/2021

## 2021-02-19 DIAGNOSIS — Z11.59 ENCOUNTER FOR SCREENING FOR OTHER VIRAL DISEASES: ICD-10-CM

## 2021-02-25 ENCOUNTER — MYC MEDICAL ADVICE (OUTPATIENT)
Dept: FAMILY MEDICINE | Facility: CLINIC | Age: 46
End: 2021-02-25

## 2021-02-25 VITALS — BODY MASS INDEX: 27.85 KG/M2 | WEIGHT: 188 LBS | HEIGHT: 69 IN

## 2021-02-25 DIAGNOSIS — R11.0 NAUSEA: Primary | ICD-10-CM

## 2021-02-25 ASSESSMENT — MIFFLIN-ST. JEOR: SCORE: 1557.14

## 2021-02-25 NOTE — PROGRESS NOTES
Sharon is a 45 year old who is being evaluated via a billable telephone visit.      What phone number would you like to be contacted at? [phone]  How would you like to obtain your AVS? Monique Welsh CMA    Phone call duration: 7 minutes  Sleep Study Follow-Up Visit:    Date on this visit: 2/26/2021    Sharon Waters is following-up of her sleep study done on 2/8/2021 at the [facility] for loud snoring, daytime fatigue/sleepiness and frequent nocturnal arousals.     Polysomnogram interpreted by Dr lowery:  Polysomnogram Data: A full night polysomnogram recorded the standard physiologic parameters including EEG, EOG, EMG, ECG, nasal and oral airflow. Respiratory parameters of chest and abdominal movements were recorded with respiratory inductance plethysmography. Oxygen saturation was recorded by pulse oximetry. Hypopnea scoring rule used: 1B 4%.     Sleep Architecture: Fragmented sleep with prolonged sleep latency and reduced sleep efficiency.   The total recording time of the polysomnogram was 465.9 minutes. The total sleep time was 275.5 minutes. Sleep latency was increased at 53.5 minutes without the use of a sleep aid. REM latency was 336.5 minutes. Arousal index was increased at 33.5 arousals per hour. Sleep efficiency was decreased at 59.1%. Wake after sleep onset was 136.5 minutes. The patient spent 15.6% of total sleep time in Stage N1, 50.6% in Stage N2, 19.4% in Stage N3, and 14.3% in REM. Time in REM supine was - minutes.     Respiration: Negative for sleep apnea.     Events ? The polysomnogram revealed a presence of - obstructive, 1 central, and 2 mixed apneas resulting in an apnea index of 0.7 events per hour. There were - obstructive hypopneas and - central hypopneas resulting in an obstructive hypopnea index of - and central hypopnea index of - events per hour. The combined apnea/hypopnea index was 0.7 events per hour (central apnea/hypopnea index was 0.2 events  per hour). The REM AHI was 4.6 events per hour. The supine AHI was - events per hour. The RERA index was - events per hour.  The RDI was 0.7 events per hour.    Snoring - was reported as loud.    Respiratory rate and pattern - was notable for normal respiratory rate and pattern.    Sustained Sleep Associated Hypoventilation - Transcutaneous carbon dioxide monitoring was not used, however significant hypoventilation was not suggested by oximetry.    Sleep Associated Hypoxemia - (Greater than 5 minutes O2 sat at or below 88%) was not present. Baseline oxygen saturation was 94.5%. Lowest oxygen saturation was 79.6%. Time spent less than or equal to 88% was 0 minutes. Time spent less than or equal to 89% was 0 minutes.     Movement Activity: Mildly increased periodic limb movement activity without a significant degree of associated arousals.     Periodic Limb Activity - There were 81 PLMs during the entire study. The PLM index was 17.6 movements per hour. The PLM Arousal Index was 4.1 per hour.    REM EMG Activity - Excessive transient/sustained muscle activity was not present.    Nocturnal Behavior - Abnormal sleep related behaviors were not noted during/arising out of NREM / REM sleep.     Bruxism - None apparent.     Cardiac Summary: Sinus rhythm.   The average pulse rate was 70.7 bpm. The minimum pulse rate was 52.0 bpm while the maximum pulse rate was 125.3 bpm.  Arrhythmias were not noted.      These findings were reviewed with patient.     Past medical/surgical history, family history, social history, medications and allergies were reviewed.      Problem List:  Patient Active Problem List    Diagnosis Date Noted     Bilateral carpal tunnel syndrome 01/18/2021     Priority: Medium     Encounter for IUD removal and reinsertion 04/28/2015     Priority: Medium        Impression/Plan:  The patient's sleep disordered breathing did not reach a level of clinical significance with AHI of 0.7.0 and RDI 0.7 events per hour.  This was a good study that included time spent in REM sleep.   Weight loss is recommended to address long term risk of sleep apnea. Patient reports that she has lost 11 lbs and snoring has improved. Patient was congratulated on the weight loss.       She will follow up with me as needed.      Negrito Rasheed PA-C

## 2021-02-25 NOTE — PATIENT INSTRUCTIONS
Your BMI is Body mass index is 27.76 kg/m .  Weight management is a personal decision.  If you are interested in exploring weight loss strategies, the following discussion covers the approaches that may be successful. Body mass index (BMI) is one way to tell whether you are at a healthy weight, overweight, or obese. It measures your weight in relation to your height.  A BMI of 18.5 to 24.9 is in the healthy range. A person with a BMI of 25 to 29.9 is considered overweight, and someone with a BMI of 30 or greater is considered obese. More than two-thirds of American adults are considered overweight or obese.  Being overweight or obese increases the risk for further weight gain. Excess weight may lead to heart disease and diabetes.  Creating and following plans for healthy eating and physical activity may help you improve your health.  Weight control is part of healthy lifestyle and includes exercise, emotional health, and healthy eating habits. Careful eating habits lifelong are the mainstay of weight control. Though there are significant health benefits from weight loss, long-term weight loss with diet alone may be very difficult to achieve- studies show long-term success with dietary management in less than 10% of people. Attaining a healthy weight may be especially difficult to achieve in those with severe obesity. In some cases, medications, devices and surgical management might be considered.  What can you do?  If you are overweight or obese and are interested in methods for weight loss, you should discuss this with your provider.     Consider reducing daily calorie intake by 500 calories.     Keep a food journal.     Avoiding skipping meals, consider cutting portions instead.    Diet combined with exercise helps maintain muscle while optimizing fat loss. Strength training is particularly important for building and maintaining muscle mass. Exercise helps reduce stress, increase energy, and improves fitness.  Increasing exercise without diet control, however, may not burn enough calories to loose weight.       Start walking three days a week 10-20 minutes at a time    Work towards walking thirty minutes five days a week     Eventually, increase the speed of your walking for 1-2 minutes at time    In addition, we recommend that you review healthy lifestyles and methods for weight loss available through the National Institutes of Health patient information sites:  http://win.niddk.nih.gov/publications/index.htm    And look into health and wellness programs that may be available through your health insurance provider, employer, local community center, or [employer] club.    Weight management plan: Patient was referred to their PCP to discuss a diet and exercise plan.

## 2021-02-26 ENCOUNTER — VIRTUAL VISIT (OUTPATIENT)
Dept: SLEEP MEDICINE | Facility: CLINIC | Age: 46
End: 2021-02-26
Payer: COMMERCIAL

## 2021-02-26 DIAGNOSIS — R06.00 DYSPNEA AND RESPIRATORY ABNORMALITIES: Primary | ICD-10-CM

## 2021-02-26 DIAGNOSIS — R06.89 DYSPNEA AND RESPIRATORY ABNORMALITIES: Primary | ICD-10-CM

## 2021-02-26 PROCEDURE — 99212 OFFICE O/P EST SF 10 MIN: CPT | Mod: TEL | Performed by: PHYSICIAN ASSISTANT

## 2021-02-26 NOTE — TELEPHONE ENCOUNTER
Routing "[employer]hart message to PCP.  Patient is asking for an RX for zofran for an upcoming colonoscopy.  RX pended if agreeable; otherwise, I will ask patient to make an e-visit.    Wander Mcgregor RN

## 2021-02-28 RX ORDER — ONDANSETRON 4 MG/1
4 TABLET, FILM COATED ORAL EVERY 8 HOURS PRN
Qty: 4 TABLET | Refills: 0 | Status: SHIPPED | OUTPATIENT
Start: 2021-02-28 | End: 2021-08-18

## 2021-03-01 ENCOUNTER — TELEPHONE (OUTPATIENT)
Dept: GASTROENTEROLOGY | Facility: CLINIC | Age: 46
End: 2021-03-01

## 2021-03-01 DIAGNOSIS — Z11.59 ENCOUNTER FOR SCREENING FOR OTHER VIRAL DISEASES: ICD-10-CM

## 2021-03-01 LAB
SARS-COV-2 RNA RESP QL NAA+PROBE: NORMAL
SPECIMEN SOURCE: NORMAL

## 2021-03-01 PROCEDURE — U0005 INFEC AGEN DETEC AMPLI PROBE: HCPCS | Performed by: INTERNAL MEDICINE

## 2021-03-01 PROCEDURE — U0003 INFECTIOUS AGENT DETECTION BY NUCLEIC ACID (DNA OR RNA); SEVERE ACUTE RESPIRATORY SYNDROME CORONAVIRUS 2 (SARS-COV-2) (CORONAVIRUS DISEASE [COVID-19]), AMPLIFIED PROBE TECHNIQUE, MAKING USE OF HIGH THROUGHPUT TECHNOLOGIES AS DESCRIBED BY CMS-2020-01-R: HCPCS | Performed by: INTERNAL MEDICINE

## 2021-03-01 NOTE — TELEPHONE ENCOUNTER
"Patient is scheduled for a colonoscopy with conscious sedation. She reports that in her twenties she had a Flexible Sigmoidoscopy and was told she had a \" long redundant transverse colon\" Should she reschedule with [facility] ? Thank You  "

## 2021-03-02 LAB
LABORATORY COMMENT REPORT: NORMAL
SARS-COV-2 RNA RESP QL NAA+PROBE: NEGATIVE
SPECIMEN SOURCE: NORMAL

## 2021-03-05 ENCOUNTER — HOSPITAL ENCOUNTER (OUTPATIENT)
Facility: AMBULATORY SURGERY CENTER | Age: 46
Discharge: HOME | End: 2021-03-05
Attending: INTERNAL MEDICINE | Admitting: INTERNAL MEDICINE
Payer: COMMERCIAL

## 2021-03-05 VITALS
BODY MASS INDEX: 27.25 KG/M2 | RESPIRATION RATE: 16 BRPM | OXYGEN SATURATION: 100 % | DIASTOLIC BLOOD PRESSURE: 67 MMHG | HEIGHT: 69 IN | TEMPERATURE: 97 F | HEART RATE: 60 BPM | WEIGHT: 184 LBS | SYSTOLIC BLOOD PRESSURE: 115 MMHG

## 2021-03-05 LAB
COLONOSCOPY: NORMAL
HCG UR QL: NEGATIVE
INTERNAL QC OK POCT: YES

## 2021-03-05 PROCEDURE — 81025 URINE PREGNANCY TEST: CPT | Performed by: INTERNAL MEDICINE

## 2021-03-05 PROCEDURE — 88305 TISSUE EXAM BY PATHOLOGIST: CPT | Mod: GC | Performed by: PATHOLOGY

## 2021-03-05 PROCEDURE — 45385 COLONOSCOPY W/LESION REMOVAL: CPT | Mod: 33

## 2021-03-05 RX ORDER — SIMETHICONE
LIQUID (ML) MISCELLANEOUS PRN
Status: DISCONTINUED | OUTPATIENT
Start: 2021-03-05 | End: 2021-03-05 | Stop reason: HOSPADM

## 2021-03-05 RX ORDER — FLUMAZENIL 0.1 MG/ML
0.2 INJECTION, SOLUTION INTRAVENOUS
Status: DISCONTINUED | OUTPATIENT
Start: 2021-03-05 | End: 2021-03-06 | Stop reason: HOSPADM

## 2021-03-05 RX ORDER — PROCHLORPERAZINE MALEATE 10 MG
10 TABLET ORAL EVERY 6 HOURS PRN
Status: DISCONTINUED | OUTPATIENT
Start: 2021-03-05 | End: 2021-03-06 | Stop reason: HOSPADM

## 2021-03-05 RX ORDER — ONDANSETRON 4 MG/1
4 TABLET, ORALLY DISINTEGRATING ORAL EVERY 6 HOURS PRN
Status: DISCONTINUED | OUTPATIENT
Start: 2021-03-05 | End: 2021-03-06 | Stop reason: HOSPADM

## 2021-03-05 RX ORDER — NALOXONE HYDROCHLORIDE 0.4 MG/ML
0.2 INJECTION, SOLUTION INTRAMUSCULAR; INTRAVENOUS; SUBCUTANEOUS
Status: DISCONTINUED | OUTPATIENT
Start: 2021-03-05 | End: 2021-03-06 | Stop reason: HOSPADM

## 2021-03-05 RX ORDER — LIDOCAINE 40 MG/G
CREAM TOPICAL
Status: DISCONTINUED | OUTPATIENT
Start: 2021-03-05 | End: 2021-03-05 | Stop reason: HOSPADM

## 2021-03-05 RX ORDER — ONDANSETRON 2 MG/ML
4 INJECTION INTRAMUSCULAR; INTRAVENOUS
Status: DISCONTINUED | OUTPATIENT
Start: 2021-03-05 | End: 2021-03-05 | Stop reason: HOSPADM

## 2021-03-05 RX ORDER — FENTANYL CITRATE 50 UG/ML
INJECTION, SOLUTION INTRAMUSCULAR; INTRAVENOUS PRN
Status: DISCONTINUED | OUTPATIENT
Start: 2021-03-05 | End: 2021-03-05 | Stop reason: HOSPADM

## 2021-03-05 RX ORDER — ONDANSETRON 2 MG/ML
4 INJECTION INTRAMUSCULAR; INTRAVENOUS EVERY 6 HOURS PRN
Status: DISCONTINUED | OUTPATIENT
Start: 2021-03-05 | End: 2021-03-06 | Stop reason: HOSPADM

## 2021-03-05 RX ORDER — NALOXONE HYDROCHLORIDE 0.4 MG/ML
0.4 INJECTION, SOLUTION INTRAMUSCULAR; INTRAVENOUS; SUBCUTANEOUS
Status: DISCONTINUED | OUTPATIENT
Start: 2021-03-05 | End: 2021-03-06 | Stop reason: HOSPADM

## 2021-03-05 ASSESSMENT — MIFFLIN-ST. JEOR: SCORE: 1539

## 2021-03-05 NOTE — H&P
"Sharon Waters  [id number]  female  45 year old      Reason for procedure/surgery: screen      Patient Active Problem List   Diagnosis     Encounter for IUD removal and reinsertion     Bilateral carpal tunnel syndrome       Past Surgical History:    Past Surgical History:   Procedure Laterality Date     NO HISTORY OF SURGERY         Past Medical History:   Past Medical History:   Diagnosis Date     Uncomplicated asthma 1992    viral induced       Social History:   Social History     Tobacco Use     Smoking status: Never Smoker     Smokeless tobacco: Never Used   Substance Use Topics     Alcohol use: Yes     Comment: 1-2 per week       Family History:   Family History   Problem Relation Age of Onset     Hypertension Mother      Colon Cancer Maternal Grandmother      Diabetes Paternal Grandmother      Breast Cancer Paternal Grandmother        Allergies:   Allergies   Allergen Reactions     Seasonal Allergies        Active Medications:   Current Outpatient Medications   Medication Sig Dispense Refill     Cholecalciferol (VITAMIN D3 PO) Take 5,000 Units by mouth daily       fluticasone (FLONASE) 50 MCG/ACT nasal spray Spray 1 spray into both nostrils daily Uses seasonally       Multiple Vitamins-Minerals (ADULT ONE DAILY GUMMIES PO)        ondansetron (ZOFRAN) 4 MG tablet Take 1 tablet (4 mg) by mouth every 8 hours as needed for nausea 4 tablet 0     levonorgestrel (MIRENA) 20 MCG/24HR IUD 1 each by Intrauterine route once         Systemic Review:   CONSTITUTIONAL: NEGATIVE for fever, chills, change in weight  ENT/MOUTH: NEGATIVE for ear, mouth and throat problems  RESP: NEGATIVE for significant cough or SOB  CV: NEGATIVE for chest pain, palpitations or peripheral edema    Physical Examination:   Vital Signs: /78   Pulse 96   Temp 96.2  F (35.7  C) (Temporal)   Resp 18   Ht 1.753 m (5' 9\")   Wt 83.5 kg (184 lb)   SpO2 100%   BMI 27.17 kg/m    GENERAL: healthy, alert and no distress  NECK: no " adenopathy, no asymmetry, masses, or scars  RESP: lungs clear to auscultation - no rales, rhonchi or wheezes  CV: regular rate and rhythm, normal S1 S2, no S3 or S4, no murmur, click or rub, no peripheral edema and peripheral pulses strong  ABDOMEN: soft, nontender, no hepatosplenomegaly, no masses and bowel sounds normal  MS: no gross musculoskeletal defects noted, no edema    ASA2  MP2    Plan: Appropriate to proceed as scheduled.      Jesse Asencio MD  3/5/2021    PCP:  Derrick Senior

## 2021-03-08 LAB — COPATH REPORT: NORMAL

## 2021-03-27 ENCOUNTER — IMMUNIZATION (OUTPATIENT)
Dept: NURSING | Facility: CLINIC | Age: 46
End: 2021-03-27
Payer: COMMERCIAL

## 2021-03-27 PROCEDURE — 0011A PR COVID VAC MODERNA 100 MCG/0.5 ML IM: CPT

## 2021-03-27 PROCEDURE — 91301 PR COVID VAC MODERNA 100 MCG/0.5 ML IM: CPT

## 2021-04-24 ENCOUNTER — IMMUNIZATION (OUTPATIENT)
Dept: NURSING | Facility: CLINIC | Age: 46
End: 2021-04-24
Attending: INTERNAL MEDICINE
Payer: COMMERCIAL

## 2021-04-24 PROCEDURE — 91301 PR COVID VAC MODERNA 100 MCG/0.5 ML IM: CPT

## 2021-04-24 PROCEDURE — 0012A PR COVID VAC MODERNA 100 MCG/0.5 ML IM: CPT

## 2021-07-24 NOTE — TELEPHONE ENCOUNTER
RECORDS RECEIVED FROM: Internal   Dx. Intractable migraine with aura without status migrainosus   Date of Appt: 8/18/2021   NOTES (FOR ALL VISITS) STATUS DETAILS   OFFICE NOTE from referring provider Internal 6/24/201 referral   1/11/2021 most recent OV note with Derrick Senior DO   OFFICE NOTE from other specialist N/A    DISCHARGE SUMMARY from hospital N/A    DISCHARGE REPORT from the ER N/A    OPERATIVE REPORT N/A    MEDICATION LIST Internal    IMAGING  (FOR ALL VISITS)     EMG N/A    EEG N/A    LUMBAR PUNCTURE N/A    JOSE A SCAN N/A    ULTRASOUND (CAROTID BILAT) *VASCULAR* N/A    MRI (HEAD, NECK, SPINE) N/A    CT (HEAD, NECK, SPINE) N/A

## 2021-08-18 ENCOUNTER — VIRTUAL VISIT (OUTPATIENT)
Dept: NEUROLOGY | Facility: CLINIC | Age: 46
End: 2021-08-18
Attending: FAMILY MEDICINE
Payer: COMMERCIAL

## 2021-08-18 ENCOUNTER — PRE VISIT (OUTPATIENT)
Dept: NEUROLOGY | Facility: CLINIC | Age: 46
End: 2021-08-18

## 2021-08-18 DIAGNOSIS — G43.109 MIGRAINE WITH AURA AND WITHOUT STATUS MIGRAINOSUS, NOT INTRACTABLE: Primary | ICD-10-CM

## 2021-08-18 PROCEDURE — 99205 OFFICE O/P NEW HI 60 MIN: CPT | Mod: 95 | Performed by: PSYCHIATRY & NEUROLOGY

## 2021-08-18 RX ORDER — RIZATRIPTAN BENZOATE 10 MG/1
10 TABLET ORAL
Qty: 18 TABLET | Refills: 3 | Status: SHIPPED | OUTPATIENT
Start: 2021-08-18

## 2021-08-18 NOTE — LETTER
8/18/2021       RE: Sharon Waters  [address]  [facility] [address]     Dear Colleague,    Thank you for referring your patient, Sharon Waters, to the [facility] NEUROLOGY CLINIC [place] at [facility]. Please see a copy of my visit note below.    MIGRAINE DISABILITY ASSESSMENT (MIDAS)    On how many days in the last 3 months did you miss work or school because of your headaches?  0    How many days in the last 3 months was your productivity at work or school reduced by half or more because of your headaches? (Do not include days you counted in question 1 where you missed work or school.)  0    On how many days in the last 3 months did you not do household work (such as housework, home repairs and maintenance, shopping, caring for children and relatives) because of your headaches?  0    How many days in the last 3 months was your productivity in household work reduced by half or more because of your headaches? (Do not include days you counted in question 3 where you did not do household work).  1    On how many days in the last 3 months did you miss family, social, or lesiure activities because of your headaches?  0    MIDAS Total Score:     On how many days in the last 3 months did you have a headache? (If a headache lasted more than 1 day, count each day.)   0    On a scale of 0 - 10, on average how painful were these headaches (where 0 = no pain at all, and 10 = pain as bad as it can be.)  2    [facility]   Clinics and Surgery Center  Virtual Neurology Consult     Sharon Waters MRN# [id number]   YOB: 1975 Age: 45 year old     Requesting physician: Derrick Senior DO         Assessment and Recommendations:     Sharon Waters is a 45 year old female who presents for further evaluation of headache.    Her headache presentation shows a long history of probable  episodic migraine with visual aura.  Recently, she had an episode of transient language deficits, followed by recovery over 4 hours, different from any previous symptoms.  The description of this event may represent a new type of aura, but by definition, would need to recur in the same fashion at least 1 more time to meet criteria for language aura.    With her history, probable episodic migraine with visual and language aura is the leading diagnosis.  However, with her significant change in symptoms, I recommended further evaluation with an MRI of the brain to evaluate for structural causes.  Additionally, on virtual examination, she has difficulty with Romberg and tandem gait describes difficulty with balance on stairs that has progressively worsened over time.  An MRI of the brain also clarify if there is a structural basis for this.    If the MRI is unrevealing, then I would recommend moving forward with a symptomatic treatment strategy for migraine.  We discussed the following:  -For acute treatment of mild headache, she may continue ibuprofen as needed, not to exceed more than 14 days/month to avoid medication overuse.  -For acute treatment of moderate to severe headache, I recommended a trial of rizatriptan 10 mg taken at the onset of headache, with a repeat dose in 2 hours if needed.  This should not exceed more than 9 days/month to avoid medication overuse.    We discussed that if her episode represented a new type of aura, I would expect it to occur again in the same way in the future.  Each type of aura can last 60 minutes, and she may have attacks with no aura, visual aura, language aura, or both.  If she is developing new symptoms or has a significant change in symptoms, I will see her back sooner, otherwise, I would like to see her back in 6 to 12 months to monitor her progress.    I spent 72 minutes on patient care and documentation.    Molly Arreola MD  Neurology            Chief Complaint:     Chief  "Complaint   Patient presents with     New Patient     Referred per Derrick Senior DO - Migraines           History is obtained from the patient and medical record.  Patient was seen via a virtual visit in their home due to the Covid 19 global pandemic.      Sharon Waters is a 45 year old female who has been living with headaches since 2001/2002.     She recalls a visual aura with this, obstructing her vision, a \"half moon zigzagging thing\". This slowly spreads across her vision over about 30-60 minutes. She had a mild headache after, 1/10. She was evaluated and diagnosed with migraine with visual aura. For about 20 years, she had 1-2 similar headaches per year. Triggers include being in the cold weather and then entering a warmer environment and looking at a screen.     In the past 2-3 years, she is having attacks outside of this specific trigger. She has 3-4 of these attacks per year now, without an obvious trigger to her.     She had one on June 19th, 2021 while cleaning her boys' bedroom. She noted a visual aura and a mild headache and sat on her couch. She then tried to talk, but the words came out gibberish. She couldn't remember the name of Windex = \"the blue stuff\" and dust rag = \"rust bag\". She told her  it was January 19th and had trouble remembering her address. She knew something was wrong. She laid down and came out of this \"fog\" of not being able to speak for about 30 minutes. Afterward, she felt overly tired/fatigue/drowsy for about 4 hours after.    Her headache is holocephalic and a general mild pain, a dull pain. This lasts until she sleeps, all day.     She also gets a headache without visual changes, about once a month. These are triggered by smells, like smoke, tar. Ibuprofen is helpful for these.     She denies nausea/vomiting. She denies photophobia/phonophobia. She describes osmophobia with smoke, tar, cologne. She has noted osmophobia with smells triggering " headaches since high school.    She denies a positional component. She denies unilateral autonomic features. She denies fevers or other illness.     Her headache doesn't improve unless she sleeps. They are generally mild enough that she doesn't choose to stop doing the activity she is doing.     She takes ibuprofen and drinks a Coke when they occur. She takes a nap for treatment.            Past Medical History:     Past Medical History:   Diagnosis Date     Uncomplicated asthma 1992    viral induced             Past Surgical History:     Past Surgical History:   Procedure Laterality Date     COLONOSCOPY N/A 3/5/2021    Procedure: COLONOSCOPY, WITH POLYPECTOMY;  Surgeon: Jesse Asencio MD;  [place] [facility] [place]     NO HISTORY OF SURGERY               Social History:   She is  with 3 children. She is working from home. She is a  at the [employer].     Her symptoms affect her ability to work, particularly her vision.     She drinks caffeine, about 6 cups of coffee per day.    Social History     Socioeconomic History     Marital status:      Spouse name: Not on file     Number of children: Not on file     Years of education: Not on file     Highest education level: Not on file   Occupational History     Not on file   Tobacco Use     Smoking status: Never Smoker     Smokeless tobacco: Never Used   Substance and Sexual Activity     Alcohol use: Yes     Comment: 1-2 per week     Drug use: No     Sexual activity: Yes     Partners: Male     Birth control/protection: I.U.D.   Other Topics Concern     Parent/sibling w/ CABG, MI or angioplasty before 65F 55M? No   Social History Narrative     Not on file     Social Determinants of Health     Financial Resource Strain:      Difficulty of Paying Living Expenses:    Food Insecurity:      Worried About Running Out of Food in the Last Year:      Ran Out of Food in the Last Year:    Transportation Needs:      Lack of Transportation (Medical):      Lack of  Transportation (Non-Medical):    Physical Activity:      Days of Exercise per Week:      Minutes of Exercise per Session:    Stress:      Feeling of Stress :    Social Connections:      Frequency of Communication with Friends and Family:      Frequency of Social Gatherings with Friends and Family:      Attends Faith Services:      Active Member of Clubs or Organizations:      Attends Club or Organization Meetings:      Marital Status:    Intimate Partner Violence:      Fear of Current or Ex-Partner:      Emotionally Abused:      Physically Abused:      Sexually Abused:              Family History:   There is no known family history, but she suspects her brother may have migraine.    Family History   Problem Relation Age of Onset     Hypertension Mother      Colon Cancer Maternal Grandmother      Diabetes Paternal Grandmother      Breast Cancer Paternal Grandmother              Allergies:      Allergies   Allergen Reactions     Seasonal Allergies              Medications:     Current Outpatient Medications:      Cholecalciferol (VITAMIN D3 PO), Take 5,000 Units by mouth daily, Disp: , Rfl:      fluticasone (FLONASE) 50 MCG/ACT nasal spray, Spray 1 spray into both nostrils daily Uses seasonally, Disp: , Rfl:      levonorgestrel (MIRENA) 20 MCG/24HR IUD, 1 each by Intrauterine route once, Disp: , Rfl:      Multiple Vitamins-Minerals (ADULT ONE DAILY GUMMIES PO), , Disp: , Rfl:           Physical Exam:   There were no vitals taken for this visit.   Physical Exam:   General: NAD  Neurologic:   Mental Status Exam: Alert, awake and oriented to situation. No dysarthria. Speech of normal fluency.   Cranial Nerves: Pupils equal, EOMs intact, no nystagmus, facial movements symmetric, hearing intact to conversation, tongue midline and fully mobile. No tongue atrophy or fasciculations.    Motor: No drift in upper extremities. Able to stand from a seated position without use of arms. No tremors or abnormal movements  "noted.   Coordination: With arms outstretched, able to touch nose using index finger accurately bilaterally.  Normal finger tapping bilaterally.     Gait: Normal stance and casual gait. (She describes a balance issue when walking up stairs that has progressively worsened. Her \"mind gets confused\" frequently, and she needs to stop walking up stairs to figure out where she is in space. Left leg maybe more than right?)    Neck: Normal range of motion with lateral head movements and neck flexion.  Eyes: No conjunctival injection, no scleral icterus.           Data:     No previous head imaging.      Again, thank you for allowing me to participate in the care of your patient.      Sincerely,    Molly Arreola MD      "

## 2021-08-18 NOTE — PROGRESS NOTES
Sharon is a 45 year old who is being evaluated via a billable video visit.      How would you like to obtain your AVS? MyChart  If the video visit is dropped, the invitation should be resent by: Text to cell phone: [phone]  Will anyone else be joining your video visit? No      Video Start Time: 7:40am  Video-Visit Details    Type of service:  Video Visit    Video End Time:8:33am    Originating Location (pt. Location): Home    Distant Location (provider location):  [facility] NEUROLOGY CLINIC [place]     Platform used for Video Visit: Mary     MIGRAINE DISABILITY ASSESSMENT (MIDAS)    On how many days in the last 3 months did you miss work or school because of your headaches?  0    How many days in the last 3 months was your productivity at work or school reduced by half or more because of your headaches? (Do not include days you counted in question 1 where you missed work or school.)  0    On how many days in the last 3 months did you not do household work (such as housework, home repairs and maintenance, shopping, caring for children and relatives) because of your headaches?  0    How many days in the last 3 months was your productivity in household work reduced by half or more because of your headaches? (Do not include days you counted in question 3 where you did not do household work).  1    On how many days in the last 3 months did you miss family, social, or lesiure activities because of your headaches?  0    MIDAS Total Score:     On how many days in the last 3 months did you have a headache? (If a headache lasted more than 1 day, count each day.)   0    On a scale of 0 - 10, on average how painful were these headaches (where 0 = no pain at all, and 10 = pain as bad as it can be.)  2    [facility]   Clinics and Surgery Center  Virtual Neurology Consult     Sharon Waters MRN# [id number]   YOB: 1975 Age: 45 year old     Requesting physician:  Derrick Senior DO         Assessment and Recommendations:     Sharon Waters is a 45 year old female who presents for further evaluation of headache.    Her headache presentation shows a long history of probable episodic migraine with visual aura.  Recently, she had an episode of transient language deficits, followed by recovery over 4 hours, different from any previous symptoms.  The description of this event may represent a new type of aura, but by definition, would need to recur in the same fashion at least 1 more time to meet criteria for language aura.    With her history, probable episodic migraine with visual and language aura is the leading diagnosis.  However, with her significant change in symptoms, I recommended further evaluation with an MRI of the brain to evaluate for structural causes.  Additionally, on virtual examination, she has difficulty with Romberg and tandem gait describes difficulty with balance on stairs that has progressively worsened over time.  An MRI of the brain also clarify if there is a structural basis for this.    If the MRI is unrevealing, then I would recommend moving forward with a symptomatic treatment strategy for migraine.  We discussed the following:  -For acute treatment of mild headache, she may continue ibuprofen as needed, not to exceed more than 14 days/month to avoid medication overuse.  -For acute treatment of moderate to severe headache, I recommended a trial of rizatriptan 10 mg taken at the onset of headache, with a repeat dose in 2 hours if needed.  This should not exceed more than 9 days/month to avoid medication overuse.    We discussed that if her episode represented a new type of aura, I would expect it to occur again in the same way in the future.  Each type of aura can last 60 minutes, and she may have attacks with no aura, visual aura, language aura, or both.  If she is developing new symptoms or has a significant change in symptoms, I will see her  "back sooner, otherwise, I would like to see her back in 6 to 12 months to monitor her progress.    I spent 72 minutes on patient care and documentation.    Molly Arreola MD  Neurology            Chief Complaint:     Chief Complaint   Patient presents with     New Patient     Referred per Derrick Senior DO - Migraines           History is obtained from the patient and medical record.  Patient was seen via a virtual visit in their home due to the Covid 19 global pandemic.      Sharon Waters is a 45 year old female who has been living with headaches since 2001/2002.     She recalls a visual aura with this, obstructing her vision, a \"half moon zigzagging thing\". This slowly spreads across her vision over about 30-60 minutes. She had a mild headache after, 1/10. She was evaluated and diagnosed with migraine with visual aura. For about 20 years, she had 1-2 similar headaches per year. Triggers include being in the cold weather and then entering a warmer environment and looking at a screen.     In the past 2-3 years, she is having attacks outside of this specific trigger. She has 3-4 of these attacks per year now, without an obvious trigger to her.     She had one on June 19th, 2021 while cleaning her boys' bedroom. She noted a visual aura and a mild headache and sat on her couch. She then tried to talk, but the words came out gibberish. She couldn't remember the name of Windex = \"the blue stuff\" and dust rag = \"rust bag\". She told her  it was January 19th and had trouble remembering her address. She knew something was wrong. She laid down and came out of this \"fog\" of not being able to speak for about 30 minutes. Afterward, she felt overly tired/fatigue/drowsy for about 4 hours after.    Her headache is holocephalic and a general mild pain, a dull pain. This lasts until she sleeps, all day.     She also gets a headache without visual changes, about once a month. These are triggered by smells, " like smoke, tar. Ibuprofen is helpful for these.     She denies nausea/vomiting. She denies photophobia/phonophobia. She describes osmophobia with smoke, tar, cologne. She has noted osmophobia with smells triggering headaches since high school.    She denies a positional component. She denies unilateral autonomic features. She denies fevers or other illness.     Her headache doesn't improve unless she sleeps. They are generally mild enough that she doesn't choose to stop doing the activity she is doing.     She takes ibuprofen and drinks a Coke when they occur. She takes a nap for treatment.            Past Medical History:     Past Medical History:   Diagnosis Date     Uncomplicated asthma 1992    viral induced             Past Surgical History:     Past Surgical History:   Procedure Laterality Date     COLONOSCOPY N/A 3/5/2021    Procedure: COLONOSCOPY, WITH POLYPECTOMY;  Surgeon: Jesse Asencio MD;  Location: [facility] OR     NO HISTORY OF SURGERY               Social History:   She is  with 3 children. She is working from home. She is a  at the [employer].     Her symptoms affect her ability to work, particularly her vision.     She drinks caffeine, about 6 cups of coffee per day.    Social History     Socioeconomic History     Marital status:      Spouse name: Not on file     Number of children: Not on file     Years of education: Not on file     Highest education level: Not on file   Occupational History     Not on file   Tobacco Use     Smoking status: Never Smoker     Smokeless tobacco: Never Used   Substance and Sexual Activity     Alcohol use: Yes     Comment: 1-2 per week     Drug use: No     Sexual activity: Yes     Partners: Male     Birth control/protection: I.U.D.   Other Topics Concern     Parent/sibling w/ CABG, MI or angioplasty before 65F 55M? No   Social History Narrative     Not on file     Social Determinants of Health     Financial Resource Strain:      Difficulty of  Paying Living Expenses:    Food Insecurity:      Worried About Running Out of Food in the Last Year:      Ran Out of Food in the Last Year:    Transportation Needs:      Lack of Transportation (Medical):      Lack of Transportation (Non-Medical):    Physical Activity:      Days of Exercise per Week:      Minutes of Exercise per Session:    Stress:      Feeling of Stress :    Social Connections:      Frequency of Communication with Friends and Family:      Frequency of Social Gatherings with Friends and Family:      Attends Christian Services:      Active Member of Clubs or Organizations:      Attends Club or Organization Meetings:      Marital Status:    Intimate Partner Violence:      Fear of Current or Ex-Partner:      Emotionally Abused:      Physically Abused:      Sexually Abused:              Family History:   There is no known family history, but she suspects her brother may have migraine.    Family History   Problem Relation Age of Onset     Hypertension Mother      Colon Cancer Maternal Grandmother      Diabetes Paternal Grandmother      Breast Cancer Paternal Grandmother              Allergies:      Allergies   Allergen Reactions     Seasonal Allergies              Medications:     Current Outpatient Medications:      Cholecalciferol (VITAMIN D3 PO), Take 5,000 Units by mouth daily, Disp: , Rfl:      fluticasone (FLONASE) 50 MCG/ACT nasal spray, Spray 1 spray into both nostrils daily Uses seasonally, Disp: , Rfl:      levonorgestrel (MIRENA) 20 MCG/24HR IUD, 1 each by Intrauterine route once, Disp: , Rfl:      Multiple Vitamins-Minerals (ADULT ONE DAILY GUMMIES PO), , Disp: , Rfl:           Physical Exam:   There were no vitals taken for this visit.   Physical Exam:   General: NAD  Neurologic:   Mental Status Exam: Alert, awake and oriented to situation. No dysarthria. Speech of normal fluency.   Cranial Nerves: Pupils equal, EOMs intact, no nystagmus, facial movements symmetric, hearing intact to  "conversation, tongue midline and fully mobile. No tongue atrophy or fasciculations.    Motor: No drift in upper extremities. Able to stand from a seated position without use of arms. No tremors or abnormal movements noted.   Coordination: With arms outstretched, able to touch nose using index finger accurately bilaterally.  Normal finger tapping bilaterally.     Gait: Normal stance and casual gait. (She describes a balance issue when walking up stairs that has progressively worsened. Her \"mind gets confused\" frequently, and she needs to stop walking up stairs to figure out where she is in space. Left leg maybe more than right?)    Neck: Normal range of motion with lateral head movements and neck flexion.  Eyes: No conjunctival injection, no scleral icterus.           Data:     No previous head imaging.      "

## 2021-09-23 ENCOUNTER — ANCILLARY PROCEDURE (OUTPATIENT)
Dept: MAMMOGRAPHY | Facility: CLINIC | Age: 46
End: 2021-09-23
Attending: OBSTETRICS & GYNECOLOGY
Payer: COMMERCIAL

## 2021-09-23 DIAGNOSIS — Z12.31 VISIT FOR SCREENING MAMMOGRAM: ICD-10-CM

## 2021-09-23 PROCEDURE — 77067 SCR MAMMO BI INCL CAD: CPT | Mod: 26 | Performed by: RADIOLOGY

## 2021-09-23 PROCEDURE — 77067 SCR MAMMO BI INCL CAD: CPT

## 2021-09-27 NOTE — RESULT ENCOUNTER NOTE
Mammo results managed by the breast center. Results communicated to the patient by their office.   Derrick Senior D.O.

## 2021-10-01 ENCOUNTER — OFFICE VISIT (OUTPATIENT)
Dept: OBGYN | Facility: CLINIC | Age: 46
End: 2021-10-01
Payer: COMMERCIAL

## 2021-10-01 VITALS
DIASTOLIC BLOOD PRESSURE: 68 MMHG | HEIGHT: 69 IN | HEART RATE: 71 BPM | BODY MASS INDEX: 25.61 KG/M2 | WEIGHT: 172.9 LBS | SYSTOLIC BLOOD PRESSURE: 119 MMHG

## 2021-10-01 DIAGNOSIS — Z23 NEED FOR PROPHYLACTIC VACCINATION AND INOCULATION AGAINST INFLUENZA: ICD-10-CM

## 2021-10-01 DIAGNOSIS — Z30.430 ENCOUNTER FOR IUD INSERTION: ICD-10-CM

## 2021-10-01 DIAGNOSIS — Z30.433 ENCOUNTER FOR REMOVAL AND REINSERTION OF INTRAUTERINE CONTRACEPTIVE DEVICE: ICD-10-CM

## 2021-10-01 DIAGNOSIS — Z12.4 CERVICAL CANCER SCREENING: ICD-10-CM

## 2021-10-01 DIAGNOSIS — Z30.433 ENCOUNTER FOR REMOVAL AND REINSERTION OF INTRAUTERINE CONTRACEPTIVE DEVICE (IUD): ICD-10-CM

## 2021-10-01 DIAGNOSIS — Z30.433 ENCOUNTER FOR IUD REMOVAL AND REINSERTION: ICD-10-CM

## 2021-10-01 DIAGNOSIS — Z01.419 ENCOUNTER FOR GYNECOLOGICAL EXAMINATION WITHOUT ABNORMAL FINDING: Primary | ICD-10-CM

## 2021-10-01 LAB — HCG UR QL: NEGATIVE

## 2021-10-01 PROCEDURE — 90686 IIV4 VACC NO PRSV 0.5 ML IM: CPT | Performed by: OBSTETRICS & GYNECOLOGY

## 2021-10-01 PROCEDURE — 58301 REMOVE INTRAUTERINE DEVICE: CPT | Performed by: OBSTETRICS & GYNECOLOGY

## 2021-10-01 PROCEDURE — G0145 SCR C/V CYTO,THINLAYER,RESCR: HCPCS | Performed by: OBSTETRICS & GYNECOLOGY

## 2021-10-01 PROCEDURE — 58300 INSERT INTRAUTERINE DEVICE: CPT | Performed by: OBSTETRICS & GYNECOLOGY

## 2021-10-01 PROCEDURE — 87624 HPV HI-RISK TYP POOLED RSLT: CPT | Performed by: OBSTETRICS & GYNECOLOGY

## 2021-10-01 PROCEDURE — 90471 IMMUNIZATION ADMIN: CPT | Performed by: OBSTETRICS & GYNECOLOGY

## 2021-10-01 PROCEDURE — 81025 URINE PREGNANCY TEST: CPT | Performed by: OBSTETRICS & GYNECOLOGY

## 2021-10-01 ASSESSMENT — MIFFLIN-ST. JEOR: SCORE: 1483.65

## 2021-10-01 NOTE — PROGRESS NOTES
Sharon is a 46 year old  female who presents for annual exam.     Menses are absent since using mirena IUD. Using IUD for contraception.  She is not currently considering pregnancy.  Besides routine health maintenance, she has no other health concerns today.  She is due for IUD replacement today.  GYNECOLOGIC HISTORY:  Menarche: 15  Age at first intercourse: 17  Sharon is sexually active with male partner(s) and is currently in monogamous relationship with .    History sexually transmitted infections:No STD history  STI testing offered?  Declined  [place] exposure: No  History of abnormal Pap smear: NO - age 30-65 PAP every 5 years with negative HPV co-testing recommended  Family history of breast CA: Yes (Please explain): fathers side  Family history of uterine/ovarian CA: No    Family history of colon CA: Yes (Please explain): mothers side    HEALTH MAINTENANCE:  Cholesterol: (  Cholesterol   Date Value Ref Range Status   2021 162 <200 mg/dL Final    History of abnormal lipids: No  Mammo: last week . History of abnormal Mammo: YES, No, .  Regular Self Breast Exams: No  Calcium/Vitamin D intake: source:  dietary supplement(s) Adequate? Yes  TSH: (  TSH   Date Value Ref Range Status   2021 3.70 0.40 - 4.00 mU/L Final    )  Pap; (  Lab Results   Component Value Date    PAP NIL 2018    PAP NIL 2015    )    HISTORY:  OB History    Para Term  AB Living   3 3 3 0 0 3   SAB TAB Ectopic Multiple Live Births   0 0 0 0 3      # Outcome Date GA Lbr Josh/2nd Weight Sex Delivery Anes PTL Lv   3 Term         YISSEL   2 Term         YISSEL   1 Term         YISSEL     Past Medical History:   Diagnosis Date     Uncomplicated asthma 1992    viral induced     Past Surgical History:   Procedure Laterality Date     COLONOSCOPY N/A 3/5/2021    Procedure: COLONOSCOPY, WITH POLYPECTOMY;  Surgeon: Jesse Asencio MD;  Location: [facility] OR     NO HISTORY OF SURGERY       Family  History   Problem Relation Age of Onset     Hypertension Mother      Colon Cancer Maternal Grandmother      Diabetes Paternal Grandmother      Breast Cancer Paternal Grandmother      Social History     Socioeconomic History     Marital status:      Spouse name: None     Number of children: None     Years of education: None     Highest education level: None   Occupational History     None   Tobacco Use     Smoking status: Never Smoker     Smokeless tobacco: Never Used   Substance and Sexual Activity     Alcohol use: Yes     Comment: 1-2 per week     Drug use: No     Sexual activity: Yes     Partners: Male     Birth control/protection: I.U.D.   Other Topics Concern     Parent/sibling w/ CABG, MI or angioplasty before 65F 55M? No   Social History Narrative     None     Social Determinants of Health     Financial Resource Strain:      Difficulty of Paying Living Expenses:    Food Insecurity:      Worried About Running Out of Food in the Last Year:      Ran Out of Food in the Last Year:    Transportation Needs:      Lack of Transportation (Medical):      Lack of Transportation (Non-Medical):    Physical Activity:      Days of Exercise per Week:      Minutes of Exercise per Session:    Stress:      Feeling of Stress :    Social Connections:      Frequency of Communication with Friends and Family:      Frequency of Social Gatherings with Friends and Family:      Attends Scientologist Services:      Active Member of Clubs or Organizations:      Attends Club or Organization Meetings:      Marital Status:    Intimate Partner Violence:      Fear of Current or Ex-Partner:      Emotionally Abused:      Physically Abused:      Sexually Abused:        Current Outpatient Medications:      Cholecalciferol (VITAMIN D3 PO), Take 5,000 Units by mouth daily, Disp: , Rfl:      fluticasone (FLONASE) 50 MCG/ACT nasal spray, Spray 1 spray into both nostrils daily Uses seasonally, Disp: , Rfl:      levonorgestrel (MIRENA) 20 MCG/24HR IUD,  "1 each by Intrauterine route once, Disp: , Rfl:      Multiple Vitamins-Minerals (ADULT ONE DAILY GUMMIES PO), , Disp: , Rfl:      rizatriptan (MAXALT) 10 MG tablet, Take 1 tablet (10 mg) by mouth at onset of headache for migraine (repeat in 2 hours if needed) May repeat in 2 hours. Max 3 tablets/24 hours., Disp: 18 tablet, Rfl: 3     Allergies   Allergen Reactions     Seasonal Allergies        Past medical, surgical, social and family history were reviewed and updated in EPIC.    ROS:   C:     NEGATIVE for fever, chills, change in weight  I:       NEGATIVE for worrisome rashes, moles or lesions  E:     NEGATIVE for vision changes or irritation  E/M: NEGATIVE for ear, mouth and throat problems  R:     NEGATIVE for significant cough or SOB  CV:   NEGATIVE for chest pain, palpitations or peripheral edema  GI:     NEGATIVE for nausea, abdominal pain, heartburn, or change in bowel habits  :   NEGATIVE for frequency, dysuria, hematuria, vaginal discharge, or irregular bleeding  M:     NEGATIVE for significant arthralgias or myalgia  N:      NEGATIVE for weakness, dizziness or paresthesias  E:      NEGATIVE for temperature intolerance, skin/hair changes  P:      NEGATIVE for changes in mood or affect.    EXAM:  /68 (BP Location: Left arm, Patient Position: Sitting, Cuff Size: Adult Regular)   Pulse 71   Ht 1.753 m (5' 9\")   Wt 78.4 kg (172 lb 14.4 oz)   BMI 25.53 kg/m     BMI: Body mass index is 25.53 kg/m .  Constitutional: healthy, alert and no distress  Head: Normocephalic. No masses, lesions, tenderness or abnormalities  Neck: Neck supple. Trachea midline. No adenopathy. Thyroid symmetric, normal size.   Cardiovascular: RRR.   Respiratory: Negative.   Breast: No nodularity, asymmetry or nipple discharge bilaterally.  Gastrointestinal: Abdomen soft, non-tender, non-distended. No masses, organomegaly.  :  Vulva:  No external lesions, normal female hair distribution, no inguinal adenopathy.    Urethra:  " Midline, non-tender, well supported, no discharge  Vagina:  Moist, pink, no abnormal discharge, no lesions IUD strings visible  Uterus:  Normal size, non-tender, freely mobile  Ovaries:  No masses appreciated, non-tender, mobile  Rectal Exam: deferred  Musculoskeletal: extremities normal  Skin: no suspicious lesions or rashes  Psychiatric: Affect appropriate, cooperative,mentation appears normal.     COUNSELING:   Reviewed preventive health counseling, as reflected in patient instructions  Special attention given to:        Regular exercise       Healthy diet/nutrition       Contraception       Osteoporosis prevention/bone health       Colon cancer screening   reports that she has never smoked. She has never used smokeless tobacco.    Body mass index is 25.53 kg/m .    FRAX Risk Assessment    ASSESSMENT:  46 year old female with satisfactory annual exam  Plan: cotesting done.  Labs with PCP.  mammo and colonoscopy UTD.  RTC yearly or prn.    ROSIE ALDANA MD

## 2021-10-01 NOTE — PROGRESS NOTES
SUBJECTIVE:    Is a pregnancy test required: Yes.  Was it positive or negative?  Negative  Was a consent obtained?  Yes    Subjective: Sharon Waters is a 46 year old  presents for IUD and desires mirena type IUD.  She requests removal of the IUD because the IUD effectiveness has     Patient has been given the opportunity to ask questions about all forms of birth control, including all options appropriate for Sharon Waters. Discussed that no method of birth control, except abstinence is 100% effective against pregnancy or sexually transmitted infection.     Sharon Waters understands she may have the IUD removed at any time. IUD should be removed by a health care provider and the current IUD will be removed today.    The entire removal and insertion procedure was reviewed with the patient, including care after placement.    Today's PHQ-2 Score:   PHQ-2 (  [employer]) 2021   Q1: Little interest or pleasure in doing things 0   Q2: Feeling down, depressed or hopeless 0   PHQ-2 Score 0   Q1: Little interest or pleasure in doing things Not at all   Q2: Feeling down, depressed or hopeless Not at all   PHQ-2 Score 0       PROCEDURE:    Premedicated with ibuprofen.  A speculum exam was performed and the cervix was visualized. The IUD string was visualized. Using ring forceps, the string  was grasped and the IUD removed intact.    Under sterile technique, cervix was visualized with speculum and prepped with Betadine solution swab x 3. Tenaculum was placed for stability. The uterus was gently straightened and sounded to 8.0 cm. IUD prepared for placement, and IUD inserted according to 's instructions without difficulty or significant ressitance, and deployed at the fundus. The strings were visualized and trimmed to 3.0 cm from the external os. Tenaculum was removed and hemostasis noted. Speculum removed.  Patient tolerated procedure well.    EBL: minimal     Complications: none      POST PROCEDURE:    Given 's handouts, including when to have IUD removed, list of danger s/sx, side effects and follow up recommended. Encouraged condom use for prevention of STD. Advised to call for any fever, for prolonged or severe pain or bleeding, abnormal vaginal dischage, or unable to palpate strings. She was advised to use pain medications (ibuprofen) as needed for mild to moderate pain. Advised to follow-up in clinic in 4-6 weeks for IUD string check if unable to find strings or as directed by provider.     Ivelisse Brantley MD

## 2021-10-05 LAB
BKR LAB AP GYN ADEQUACY: NORMAL
BKR LAB AP GYN INTERPRETATION: NORMAL
BKR LAB AP HPV REFLEX: NORMAL
BKR LAB AP PREVIOUS ABNORMAL: NORMAL
PATH REPORT.COMMENTS IMP SPEC: NORMAL
PATH REPORT.RELEVANT HX SPEC: NORMAL

## 2021-10-09 LAB
HUMAN PAPILLOMA VIRUS 16 DNA: NEGATIVE
HUMAN PAPILLOMA VIRUS 18 DNA: NEGATIVE
HUMAN PAPILLOMA VIRUS FINAL DIAGNOSIS: NORMAL
HUMAN PAPILLOMA VIRUS OTHER HR: NEGATIVE

## 2021-11-07 ENCOUNTER — APPOINTMENT (OUTPATIENT)
Dept: ULTRASOUND IMAGING | Facility: CLINIC | Age: 46
End: 2021-11-07
Attending: EMERGENCY MEDICINE
Payer: COMMERCIAL

## 2021-11-07 ENCOUNTER — HOSPITAL ENCOUNTER (EMERGENCY)
Facility: CLINIC | Age: 46
Discharge: HOME | End: 2021-11-07
Attending: EMERGENCY MEDICINE | Admitting: EMERGENCY MEDICINE
Payer: COMMERCIAL

## 2021-11-07 ENCOUNTER — APPOINTMENT (OUTPATIENT)
Dept: CT IMAGING | Facility: CLINIC | Age: 46
End: 2021-11-07
Attending: EMERGENCY MEDICINE
Payer: COMMERCIAL

## 2021-11-07 VITALS
RESPIRATION RATE: 18 BRPM | OXYGEN SATURATION: 98 % | TEMPERATURE: 98.2 F | HEART RATE: 84 BPM | DIASTOLIC BLOOD PRESSURE: 67 MMHG | SYSTOLIC BLOOD PRESSURE: 102 MMHG

## 2021-11-07 DIAGNOSIS — G96.191 PERINEURAL CYST: Primary | ICD-10-CM

## 2021-11-07 DIAGNOSIS — R10.84 ABDOMINAL PAIN, GENERALIZED: ICD-10-CM

## 2021-11-07 LAB
ALBUMIN SERPL-MCNC: 4 G/DL (ref 3.4–5)
ALBUMIN UR-MCNC: NEGATIVE MG/DL
ALP SERPL-CCNC: 52 U/L (ref 40–150)
ALT SERPL W P-5'-P-CCNC: 24 U/L (ref 0–50)
ANION GAP SERPL CALCULATED.3IONS-SCNC: 3 MMOL/L (ref 3–14)
APPEARANCE UR: CLEAR
AST SERPL W P-5'-P-CCNC: 13 U/L (ref 0–45)
BACTERIA #/AREA URNS HPF: ABNORMAL /HPF
BASOPHILS # BLD AUTO: 0.1 10E3/UL (ref 0–0.2)
BASOPHILS NFR BLD AUTO: 1 %
BILIRUB SERPL-MCNC: 0.7 MG/DL (ref 0.2–1.3)
BILIRUB UR QL STRIP: NEGATIVE
BUN SERPL-MCNC: 12 MG/DL (ref 7–30)
CALCIUM SERPL-MCNC: 8.5 MG/DL (ref 8.5–10.1)
CHLORIDE BLD-SCNC: 107 MMOL/L (ref 94–109)
CLUE CELLS: ABNORMAL
CO2 SERPL-SCNC: 28 MMOL/L (ref 20–32)
COLOR UR AUTO: ABNORMAL
CREAT SERPL-MCNC: 0.84 MG/DL (ref 0.52–1.04)
EOSINOPHIL # BLD AUTO: 0.1 10E3/UL (ref 0–0.7)
EOSINOPHIL NFR BLD AUTO: 1 %
ERYTHROCYTE [DISTWIDTH] IN BLOOD BY AUTOMATED COUNT: 12 % (ref 10–15)
GFR SERPL CREATININE-BSD FRML MDRD: 83 ML/MIN/1.73M2
GLUCOSE BLD-MCNC: 86 MG/DL (ref 70–99)
GLUCOSE UR STRIP-MCNC: NEGATIVE MG/DL
HCG UR QL: NEGATIVE
HCO3 BLDV-SCNC: 28 MMOL/L (ref 21–28)
HCT VFR BLD AUTO: 39.4 % (ref 35–47)
HGB BLD-MCNC: 13.4 G/DL (ref 11.7–15.7)
HGB UR QL STRIP: ABNORMAL
IMM GRANULOCYTES # BLD: 0.1 10E3/UL
IMM GRANULOCYTES NFR BLD: 1 %
KETONES UR STRIP-MCNC: NEGATIVE MG/DL
LACTATE BLD-SCNC: 0.4 MMOL/L
LEUKOCYTE ESTERASE UR QL STRIP: NEGATIVE
LIPASE SERPL-CCNC: 99 U/L (ref 73–393)
LYMPHOCYTES # BLD AUTO: 1.2 10E3/UL (ref 0.8–5.3)
LYMPHOCYTES NFR BLD AUTO: 11 %
MCH RBC QN AUTO: 30.9 PG (ref 26.5–33)
MCHC RBC AUTO-ENTMCNC: 34 G/DL (ref 31.5–36.5)
MCV RBC AUTO: 91 FL (ref 78–100)
MONOCYTES # BLD AUTO: 0.6 10E3/UL (ref 0–1.3)
MONOCYTES NFR BLD AUTO: 5 %
MUCOUS THREADS #/AREA URNS LPF: PRESENT /LPF
NEUTROPHILS # BLD AUTO: 8.4 10E3/UL (ref 1.6–8.3)
NEUTROPHILS NFR BLD AUTO: 81 %
NITRATE UR QL: NEGATIVE
NRBC # BLD AUTO: 0 10E3/UL
NRBC BLD AUTO-RTO: 0 /100
PCO2 BLDV: 51 MM HG (ref 40–50)
PH BLDV: 7.35 [PH] (ref 7.32–7.43)
PH UR STRIP: 5.5 [PH] (ref 5–7)
PLATELET # BLD AUTO: 192 10E3/UL (ref 150–450)
PO2 BLDV: 17 MM HG (ref 25–47)
POTASSIUM BLD-SCNC: 3.9 MMOL/L (ref 3.4–5.3)
PROT SERPL-MCNC: 7.3 G/DL (ref 6.8–8.8)
RBC # BLD AUTO: 4.33 10E6/UL (ref 3.8–5.2)
RBC URINE: <1 /HPF
SAO2 % BLDV: 22 % (ref 94–100)
SODIUM SERPL-SCNC: 138 MMOL/L (ref 133–144)
SP GR UR STRIP: 1.01 (ref 1–1.03)
SQUAMOUS EPITHELIAL: <1 /HPF
TRICHOMONAS, WET PREP: ABNORMAL
UROBILINOGEN UR STRIP-MCNC: NORMAL MG/DL
WBC # BLD AUTO: 10.3 10E3/UL (ref 4–11)
WBC URINE: 1 /HPF
WBC'S/HIGH POWER FIELD, WET PREP: ABNORMAL
YEAST, WET PREP: ABNORMAL

## 2021-11-07 PROCEDURE — 85048 AUTOMATED LEUKOCYTE COUNT: CPT | Performed by: EMERGENCY MEDICINE

## 2021-11-07 PROCEDURE — 87591 N.GONORRHOEAE DNA AMP PROB: CPT | Performed by: EMERGENCY MEDICINE

## 2021-11-07 PROCEDURE — 250N000013 HC RX MED GY IP 250 OP 250 PS 637: Performed by: EMERGENCY MEDICINE

## 2021-11-07 PROCEDURE — 99285 EMERGENCY DEPT VISIT HI MDM: CPT | Performed by: EMERGENCY MEDICINE

## 2021-11-07 PROCEDURE — 80053 COMPREHEN METABOLIC PANEL: CPT | Performed by: EMERGENCY MEDICINE

## 2021-11-07 PROCEDURE — 76830 TRANSVAGINAL US NON-OB: CPT

## 2021-11-07 PROCEDURE — 87210 SMEAR WET MOUNT SALINE/INK: CPT | Performed by: EMERGENCY MEDICINE

## 2021-11-07 PROCEDURE — 36415 COLL VENOUS BLD VENIPUNCTURE: CPT | Performed by: EMERGENCY MEDICINE

## 2021-11-07 PROCEDURE — 99213 OFFICE O/P EST LOW 20 MIN: CPT | Mod: 25 | Performed by: OBSTETRICS & GYNECOLOGY

## 2021-11-07 PROCEDURE — 81025 URINE PREGNANCY TEST: CPT | Performed by: EMERGENCY MEDICINE

## 2021-11-07 PROCEDURE — 83690 ASSAY OF LIPASE: CPT | Performed by: EMERGENCY MEDICINE

## 2021-11-07 PROCEDURE — 250N000011 HC RX IP 250 OP 636: Performed by: EMERGENCY MEDICINE

## 2021-11-07 PROCEDURE — 87491 CHLMYD TRACH DNA AMP PROBE: CPT | Performed by: EMERGENCY MEDICINE

## 2021-11-07 PROCEDURE — 74174 CTA ABD&PLVS W/CONTRAST: CPT

## 2021-11-07 PROCEDURE — 82803 BLOOD GASES ANY COMBINATION: CPT

## 2021-11-07 PROCEDURE — 250N000009 HC RX 250: Performed by: EMERGENCY MEDICINE

## 2021-11-07 PROCEDURE — 58301 REMOVE INTRAUTERINE DEVICE: CPT | Mod: GC | Performed by: OBSTETRICS & GYNECOLOGY

## 2021-11-07 PROCEDURE — 99285 EMERGENCY DEPT VISIT HI MDM: CPT | Mod: 25 | Performed by: EMERGENCY MEDICINE

## 2021-11-07 PROCEDURE — 81001 URINALYSIS AUTO W/SCOPE: CPT | Performed by: EMERGENCY MEDICINE

## 2021-11-07 RX ORDER — ACETAMINOPHEN 325 MG/1
975 TABLET ORAL EVERY 4 HOURS PRN
Status: DISCONTINUED | OUTPATIENT
Start: 2021-11-07 | End: 2021-11-07 | Stop reason: HOSPADM

## 2021-11-07 RX ORDER — IOPAMIDOL 755 MG/ML
100 INJECTION, SOLUTION INTRAVASCULAR ONCE
Status: COMPLETED | OUTPATIENT
Start: 2021-11-07 | End: 2021-11-07

## 2021-11-07 RX ORDER — IBUPROFEN 200 MG
200 TABLET ORAL EVERY 4 HOURS PRN
COMMUNITY
End: 2022-03-08

## 2021-11-07 RX ORDER — CETIRIZINE HYDROCHLORIDE 5 MG/1
5 TABLET ORAL DAILY
COMMUNITY

## 2021-11-07 RX ADMIN — SODIUM CHLORIDE 65 ML: 9 INJECTION, SOLUTION INTRAVENOUS at 12:42

## 2021-11-07 RX ADMIN — ACETAMINOPHEN 975 MG: 325 TABLET, FILM COATED ORAL at 14:30

## 2021-11-07 RX ADMIN — IOPAMIDOL 80 ML: 755 INJECTION, SOLUTION INTRAVENOUS at 12:45

## 2021-11-07 ASSESSMENT — ENCOUNTER SYMPTOMS
HEMATURIA: 0
DIAPHORESIS: 1
COUGH: 0
RECTAL PAIN: 1
ABDOMINAL PAIN: 1
VOMITING: 0
DYSURIA: 0
DIARRHEA: 0
FEVER: 0
BACK PAIN: 0
NAUSEA: 1
BLOOD IN STOOL: 0
CHILLS: 0

## 2021-11-07 NOTE — ED NOTES
Patient stated that she is having lower abd pain that radiates into her rectum. Patient stated that she's never had this pain before. Pain stated that this pain stated last Tuesday. General tenderness since Tuesday in her lower abd/bottom rectal area. Patient also stated that she had an IUD placed recently and wonders if this new pain could be related to her IUD. Patient stated that she is having no pain at the moment. RN notified.

## 2021-11-07 NOTE — CONSULTS
Gynecology Consult Note    Patient Summary:  Sharon Waters is a 46 year old female seen at the request of Dr. Haas.      HPI: Ms. Waters has had intermittent episodes of acute onset pain which causes her to double over and makes it difficult to walk. It is sharp, starts in lower abdomen, centrally and radiates to rectum. She has had two episodes one on Tuesday and one today. They resolve on their own and last about an hour or less. Not related to food intake or bowel movements. No blood in stool or from vagina. Had a period once since IUD placed. She has had no change in stooling or eating. No emesis but today she did feel nauseated secondary to pain. No vaginal bleeding. Amenorrheic secondary to IUD which was placed on 10/1. No menstrual like cramps, more rectal pain. No fevers or chills.     ROS: 10 point ROS negative unless mentioned in HPI    PMH:   Past Medical History:   Diagnosis Date     Uncomplicated asthma 1992    viral induced       PSHx:   Past Surgical History:   Procedure Laterality Date     COLONOSCOPY N/A 3/5/2021    Procedure: COLONOSCOPY, WITH POLYPECTOMY;  Surgeon: Jesse Asencio MD;  Location: [facility] OR     NO HISTORY OF SURGERY         Medications:   Current Facility-Administered Medications   Medication     acetaminophen (TYLENOL) tablet 975 mg     Current Outpatient Medications   Medication     cetirizine (ZYRTEC) 5 MG tablet     Cholecalciferol (VITAMIN D3 PO)     fluticasone (FLONASE) 50 MCG/ACT nasal spray     ibuprofen (ADVIL/MOTRIN) 200 MG tablet     Multiple Vitamins-Minerals (ADULT ONE DAILY GUMMIES PO)     rizatriptan (MAXALT) 10 MG tablet     levonorgestrel (MIRENA) 20 MCG/24HR IUD     levonorgestrel (MIRENA) 20 MCG/24HR IUD     levonorgestrel (MIRENA) 20 MCG/24HR IUD     No current facility-administered medications on file prior to encounter.  cetirizine (ZYRTEC) 5 MG tablet, Take 5 mg by mouth daily  Cholecalciferol (VITAMIN D3 PO), Take 5,000 Units by mouth  daily  fluticasone (FLONASE) 50 MCG/ACT nasal spray, Spray 1 spray into both nostrils daily Uses seasonally  ibuprofen (ADVIL/MOTRIN) 200 MG tablet, Take 200 mg by mouth every 4 hours as needed for mild pain  Multiple Vitamins-Minerals (ADULT ONE DAILY GUMMIES PO),   rizatriptan (MAXALT) 10 MG tablet, Take 1 tablet (10 mg) by mouth at onset of headache for migraine (repeat in 2 hours if needed) May repeat in 2 hours. Max 3 tablets/24 hours.  levonorgestrel (MIRENA) 20 MCG/24HR IUD, 1 each (20 mcg) by Intrauterine route once  levonorgestrel (MIRENA) 20 MCG/24HR IUD, 1 each (20 mcg) by Intrauterine route once for 1 dose  levonorgestrel (MIRENA) 20 MCG/24HR IUD, 1 each by Intrauterine route once        Allergies:      Allergies   Allergen Reactions     Seasonal Allergies        Social History:   Social History     Socioeconomic History     Marital status:      Spouse name: Not on file     Number of children: Not on file     Years of education: Not on file     Highest education level: Not on file   Occupational History     Not on file   Tobacco Use     Smoking status: Never Smoker     Smokeless tobacco: Never Used   Substance and Sexual Activity     Alcohol use: Yes     Comment: 1-2 per week     Drug use: No     Sexual activity: Yes     Partners: Male     Birth control/protection: I.U.D.   Other Topics Concern     Parent/sibling w/ CABG, MI or angioplasty before 65F 55M? No   Social History Narrative     Not on file     Social Determinants of Health     Financial Resource Strain: Not on file   Food Insecurity: Not on file   Transportation Needs: Not on file   Physical Activity: Not on file   Stress: Not on file   Social Connections: Not on file   Intimate Partner Violence: Not on file   Housing Stability: Not on file     Social History     Socioeconomic History     Marital status:      Spouse name: Not on file     Number of children: Not on file     Years of education: Not on file     Highest education  level: Not on file   Occupational History     Not on file   Tobacco Use     Smoking status: Never Smoker     Smokeless tobacco: Never Used   Substance and Sexual Activity     Alcohol use: Yes     Comment: 1-2 per week     Drug use: No     Sexual activity: Yes     Partners: Male     Birth control/protection: I.U.D.   Other Topics Concern     Parent/sibling w/ CABG, MI or angioplasty before 65F 55M? No   Social History Narrative     Not on file     Social Determinants of Health     Financial Resource Strain: Not on file   Food Insecurity: Not on file   Transportation Needs: Not on file   Physical Activity: Not on file   Stress: Not on file   Social Connections: Not on file   Intimate Partner Violence: Not on file   Housing Stability: Not on file       Physical Exam:   Vitals:    11/07/21 1054 11/07/21 1543   BP: 111/71 102/67   Pulse: 70 84   Resp:  18   Temp: 98.2  F (36.8  C)    SpO2: 100% 98%      Gen:  HEENT: Neck supple, no cervical lymphadenopathy; oral MMM  CV: RRR, no murmurs/rubs/gallops; peripheral pulses 2+ bilaterally  Pulm: CTAB, no increased work of breathing, no wheezing/rhonchi/crackles  Abd: non-tender, non-distended, +BS  Pelvis:  - on speculum exam, strings visualized. Normal appearing cervix with physiologic discharge. No blood in vaginal vault.   - on bimanual exam, no pain or masses in the adnexal regions, some suprapubic tenderness and pain within posterior fornix when pushing posteriorly towards rectum   Extremities: non-tender, no erythema; no edema    Labs:   Wet prep neg  CBC notable for WBC 10.3, LA 0.4  BMP normal   Liver panel normal     Imaging:   CT AP:      INDICATION: Lower abdominal and rectal pain  COMPARISON: None.  TECHNIQUE: CT angiogram abdomen pelvis during arterial phase of injection of IV contrast. 2D and 3D MIP reconstructions were performed by the CT technologist. Dose reduction techniques were used.  CONTRAST: 80ml isovue 370     FINDINGS:  ANGIOGRAM ABDOMEN/PELVIS: Normal  caliber abdominal aorta and iliac arteries without evidence of a dissection. The celiac artery and its major branch vessels, superior mesenteric artery, bilateral renal arteries and inferior mesenteric artery are widely   patent. No active contrast extravasation within the stomach, small bowel, colon or rectum.     LOWER CHEST: Normal.     HEPATOBILIARY: Normal.     PANCREAS: Normal.     SPLEEN: Normal.     ADRENAL GLANDS: Normal.     KIDNEYS/BLADDER: Normal.     BOWEL: Unremarkable stomach. Normal caliber small bowel without inflammatory changes. Normal appendix. Tortuous normal caliber colon with mild to moderate fecal retention consistent with constipation. Mild to moderate fecal retention within the distended   rectum. No free air or free fluid.     LYMPH NODES: Prominent likely reactive mesenteric lymph nodes.     PELVIC ORGANS: IUD centered within the endometrial cavity. Incidental dominant right ovarian follicle. No free fluid.     MUSCULOSKELETAL: Sacral perineural cysts including a dominant 2.7 cm left S1-S2 perineural cyst with associated foraminal stenosis.          Pelvic US:                                                             IMPRESSION:  1.  No acute findings within the patient's symptoms. No evidence of an inflammatory process, bowel obstruction or active GI bleeding.  2.  Mild to moderate colonic fecal retention consistent with constipation.  3.  Large (2.7 cm) upper left sacral perineural cyst with stenosis of the left S1-2 neural foramen.    EXAM: US PELVIS COMPLETE W TRANSVAGINAL AND DOPPLER LIMITED  LOCATION: [facility]  DATE/TIME: 11/7/2021 3:16 PM     INDICATION: Pelvic pain.  COMPARISON: None.  TECHNIQUE: Transabdominal scans were performed. Endovaginal ultrasound was performed to better visualize the adnexa. Color flow with spectral Doppler and waveform analysis performed.     FINDINGS:     UTERUS: 9.6 x 4.4 x 3.7 cm. IUD along the lower  uterine segment with the prongs appearing to extend into the myometrium.     ENDOMETRIUM: 3 mm. Normal smooth endometrium.     RIGHT OVARY: 4.5 x 3.7 x 3.6 cm. Normal with arterial and venous duplex flow identified. 2.5 cm physiologic cyst requiring no follow-up.     LEFT OVARY: 3.1 x 1.5 x 1.6 cm. Normal with arterial and venous duplex flow identified.     No significant free fluid.                                                                      IMPRESSION:       1.  Malpositioned IUD along the lower uterine segment with the prongs appearing to extend into the myometrium.     Procedure:   Verbally consented patient for removal of IUD. Patient agrees to proceed. Placed speculum with visualization of cervix, grasped IUD strings with ring forceps and easily removed IUD. Removed speculum. Patient tolerated the procedure well. IUD intact.     A&P:     Ms. Waters presents with two discrete episodes of lower abdominal/rectal pain and is currently asymptomatic. Workup notable for malpositioned IUD on US. Discussed with patient that it though it is unlikely the malpositioned IUD that is causing the pain (typically this presents with more cramping/contraction like pain which [place] is not having); but discussed it is one variable that could be eliminated from the possible etiologies of pain. Discussed the cyst on ovary is small and likely physiologic as the IUD does not prevent ovulation. Also at this size, unlikely to be torsing and US noted blood flow to the ovary. Reviewed that it is still possible that this could be torsion, but as she is near menopause and at this moment does not have pain (and no pain over adnexae on bimanual exam), would not recommend surgical intervention at this point in time. Offered OCPs or alternate form of contraception and discussed that she is still able to conceive; she and her partner will use barrier methods until she has IUD replaced, which can be done whenever she is ready (ideally  after NSGY visit). Encouraged bowel regimen to encourage regular BM and also follow up with NSGY for incidental sacral perineural cyst which she plans to do as an outpatient.   Thank you for this consult.  Please do not hesitate to contact us with concerns or questions.     Patient seen and care plan discussed  with Dr. Phyllis Spring MD, [facility] [place]   11/07/21 6:38 PM

## 2021-11-07 NOTE — DISCHARGE INSTRUCTIONS
Please make an appointment to follow up with Your Primary Care Provider and Neurosurgery Clinic (phone: [phone]) as soon as possible.  Follow-up with outpatient MRI of spine.      If you have worsening symptoms including increased pain, fevers, intractable vomiting, leg weakness, loss of bowel or bladder control or other concerns, return to the emergency department for re-evaluation.

## 2021-11-07 NOTE — ED PROVIDER NOTES
"    [facility] EMERGENCY DEPARTMENT ([facility])       11/07/21  History     Chief Complaint   Patient presents with     Abdominal Pain     The history is provided by the patient and medical records.     Sharon Waters is a 46 year old female with no relevant past medical history who presents to the Emergency Department for evaluation of lower abdominal pain radiating down into the rectum.    Patient reports that that she began to feel acute lower abdominal pain three days ago.  She states that she was working at home when she went to try to make dinner and began to have sudden pain across the mid-lower abdomen.  She endorses that this pain was not gradual and was sudden in nature.  She says that the pain was so intense that she could not stand up on her own.  She adds that the pain is not associated with eating.  She notes that the episode lasted about 30 minutes and then resolved. Her pain was across the abdomen and not unilateral in nature, the pain did radiate down into her rectum.     She says that she woke up this morning around 0800 and went to have a bowel movement when she began to experience intense lower abdominal and rectal pain.  She adds that after the bowel movement she went to go sit on the edge of her bed and could not find a comfortable position.  She says that at the time of the episode her abdominal and rectal area were tender to touch.  She describes the pain intensity as a 10 out of 10 and \"worst than childbirth\".  She reports that the pain intensity had her yelling in pain with associated nausea and sweating.  She says that she later went to it on the toilet toilet due to nausea and had a bowel movement which was regular but firmer than usual.  She adds that this bowel movement was not painful.  She did have some right sided rectal pain with this.  She adds that she had IUD replacement 10/1/21 which is the 3rd IUD she has had and does not usually have periods with her IUD.  " Patient rates her current pain intensity as a 2-3/10 while in the ED today but notes that she still feels tender in her lower abdomen.  She denies any pain radiating into the legs.  Denies any numbness or weakness in the legs.  Patient denies vomiting, fever, back pain, dysuria, hematuria, vaginal discharge, vaginal bleeding, dark or bloody stools, cough, or chills.  Patient denies history of abdominal surgeries.  Patient denies recent travel.  Patient denies recent sick contacts.    Of note, patient reports that she used a vibrating ball massager wand two days prior to the beginning of her symptoms.    Past Medical History:   Diagnosis Date     Uncomplicated asthma 1992    viral induced       Past Surgical History:   Procedure Laterality Date     COLONOSCOPY N/A 3/5/2021    Procedure: COLONOSCOPY, WITH POLYPECTOMY;  Surgeon: Jesse Asencio MD;  Location: [facility] OR     NO HISTORY OF SURGERY         Family History   Problem Relation Age of Onset     Hypertension Mother      Colon Cancer Maternal Grandmother      Diabetes Paternal Grandmother      Breast Cancer Paternal Grandmother        Social History     Tobacco Use     Smoking status: Never Smoker     Smokeless tobacco: Never Used   Substance Use Topics     Alcohol use: Yes     Comment: 1-2 per week       No current facility-administered medications for this encounter.     Current Outpatient Medications   Medication     cetirizine (ZYRTEC) 5 MG tablet     Cholecalciferol (VITAMIN D3 PO)     fluticasone (FLONASE) 50 MCG/ACT nasal spray     ibuprofen (ADVIL/MOTRIN) 200 MG tablet     Multiple Vitamins-Minerals (ADULT ONE DAILY GUMMIES PO)     rizatriptan (MAXALT) 10 MG tablet     levonorgestrel (MIRENA) 20 MCG/24HR IUD     levonorgestrel (MIRENA) 20 MCG/24HR IUD     levonorgestrel (MIRENA) 20 MCG/24HR IUD        Allergies   Allergen Reactions     Seasonal Allergies         I have reviewed the Medications, Allergies, Past Medical and Surgical History, and Social  History in the Epic system.    Review of Systems   Constitutional: Positive for diaphoresis. Negative for chills and fever.   Respiratory: Negative for cough.    Gastrointestinal: Positive for abdominal pain (lower abd pain but greater on the right side), nausea and rectal pain. Negative for blood in stool, diarrhea and vomiting.   Genitourinary: Negative for dysuria, hematuria, vaginal bleeding and vaginal discharge.   Musculoskeletal: Negative for back pain.   All other systems reviewed and are negative.    A complete review of systems was performed with pertinent positives and negatives noted in the HPI, and all other systems negative.    Physical Exam   BP: 111/71  Pulse: 70  Temp: 98.2  F (36.8  C)  Resp: 18  SpO2: 100 %    Physical Exam   General: patient is alert and oriented and in no acute distress   Head: atraumatic and normocephalic   EENT: moist mucus membranes, pupils round and reactive, sclera anicteric   Neck: supple   Cardiovascular: regular rate and rhythm, extremities warm and well perfused, no lower extremity edema  Pulmonary: lungs clear to auscultation bilaterally   Abdomen: soft, mild lower TTP across the abdomen, no CVA TTP  Gyn: no mucopurulent vaginal discharge, IUD strings present, no CMT, no significant adnexal TTP, no rectal erythema, induration or swelling  Musculoskeletal: normal range of motion   Neurological: alert and oriented, moving all extremities symmetrically, gait normal   Skin: warm, dry       ED Course     At 9:08 AM the patient was seen and examined by Esther Hendricks MD in Room ED06.        Procedures               No results found for this or any previous visit (from the past 24 hour(s)).  Medications - No data to display          Assessments & Plan (with Medical Decision Making)   Ms. Waters is a 46 year old female with no relevant past medical history who presents to the Emergency Department for evaluation of lower abdominal pain radiating down into the rectum. She is  hemodynamically stable, afebrile and in no respiratory distress. Differential diagnosis includes but is not limited to diverticulitis, appendicitis, perirectal abscess, spinal lesion, bowel obstruction, bowel ischemia, ureterolithiasis, IUD misplacement, ovarian cyst, ovarian torsion, PID, UTI. Patient did have a CTA of the abdomen which showed no evidence of obstruction or vascular abnormality. No evidence of true abdominal infection. She has no evidence of ureterolithiasis. She was noted to have a 2.7 cm perineural cyst abutting the left S1-S2 nerve root. I did discuss with neurosurgery and it is unlikely that this is causing her symptoms currently but will need outpatient follow-up. She will follow-up outpatient for an MRI and neurosurgery clinic evaluation. I discussed these findings with the patient and she reports she has had tailbone pain for a number of years that she associated with a prior fall. She denies noting significant weakness or incontinence. Does not have indication for emergent MRI imaging at this time. She was given close precautions if she should develop worsening symptoms to immediately return to the emergency department and voiced understanding.    Additionally her labs show no evidence of leukocytosis or significant electrolyte abnormality. Her UA is negative for evidence of UTI. On pelvic exam she does not have findings suggestive of PID. Wet prep is negative. An ultrasound was obtained which shows a physiologic right ovarian cyst that is quite small. She has normal Doppler flow and low suspicion for ovarian torsion. Her ultrasound does show a malpositioned IUD in the myometrium. OB was consulted and has seen the patient and removed her IUD. Torsion is also felt to be low likelihood based on OB evaluation. She continued to remain asymptomatic during her ED stay. We will plan to discharge to home with primary care follow-up and close return precautions for the emergency department and  patient voiced understanding.    I have reviewed the nursing notes.    I have reviewed the findings, diagnosis, plan and need for follow up with the patient.    Discharge Medication List as of 11/7/2021  5:34 PM          Final diagnoses:   Abdominal pain, generalized       I, Shu Malave, am serving as a trained medical scribe to document services personally performed by Esther Hendricks MD, based on the provider's statements to me.      I, Esther Hendricks MD, was physically present and have reviewed and verified the accuracy of this note documented by Shu Malave.      Esther Hendricks MD  11/7/2021   [facility] EMERGENCY DEPARTMENT     Esther Hendricks MD  11/07/21 1740

## 2021-11-08 LAB
C TRACH DNA SPEC QL NAA+PROBE: NEGATIVE
N GONORRHOEA DNA SPEC QL NAA+PROBE: NEGATIVE

## 2021-11-08 NOTE — RESULT ENCOUNTER NOTE
Final result for both N. Gonorrhoeae PCR and Chlamydia Trachomatis PCR are NEGATIVE.  No treatment or change in treatment per [facility] ED Lab Result N. Gonorrhea AND/OR Chlamydia T. protocol.

## 2021-11-11 ENCOUNTER — TELEPHONE (OUTPATIENT)
Dept: NEUROSURGERY | Facility: CLINIC | Age: 46
End: 2021-11-11
Payer: COMMERCIAL

## 2021-11-11 NOTE — TELEPHONE ENCOUNTER
SPINE PATIENTS - NEW PROTOCOL PREVISIT    RECORDS RECEIVED FROM: Internal   REASON FOR VISIT: ED f/u   Date of Appt: 12/9/21   NOTES (FOR ALL VISITS) STATUS DETAILS   OFFICE NOTE from referring provider Internal SEE INPATIENT NOTES   OFFICE NOTE from other specialist N/A    DISCHARGE SUMMARY from hospital N/A    DISCHARGE REPORT from ER Internal [facility]:  11/7/21   EMG REPORT N/A    MEDICATION LIST Internal    IMAGING  (FOR ALL VISITS)     MRI (HEAD, NECK, SPINE) Internal [facility]:  MRI Lumbar Spine 11/22/21   XRAY (SPINE) *NEUROSURGERY* N/A    CT (HEAD, NECK, SPINE) Internal [facility]:  CTA Abdomen Pelvis 11/7/21

## 2021-11-22 ENCOUNTER — ANCILLARY PROCEDURE (OUTPATIENT)
Dept: MRI IMAGING | Facility: CLINIC | Age: 46
End: 2021-11-22
Attending: STUDENT IN AN ORGANIZED HEALTH CARE EDUCATION/TRAINING PROGRAM
Payer: COMMERCIAL

## 2021-11-22 DIAGNOSIS — G96.191 PERINEURAL CYST: ICD-10-CM

## 2021-11-22 PROCEDURE — 72148 MRI LUMBAR SPINE W/O DYE: CPT | Performed by: RADIOLOGY

## 2021-12-09 ENCOUNTER — PRE VISIT (OUTPATIENT)
Dept: NEUROSURGERY | Facility: CLINIC | Age: 46
End: 2021-12-09

## 2022-01-24 ENCOUNTER — VIRTUAL VISIT (OUTPATIENT)
Dept: NEUROSURGERY | Facility: CLINIC | Age: 47
End: 2022-01-24
Payer: COMMERCIAL

## 2022-01-24 DIAGNOSIS — G96.191 TARLOV CYST: Primary | ICD-10-CM

## 2022-01-24 PROCEDURE — 99203 OFFICE O/P NEW LOW 30 MIN: CPT | Mod: 95 | Performed by: NURSE PRACTITIONER

## 2022-01-24 NOTE — LETTER
"1/24/2022       RE: Sharon Waters  [address]  [place] [address]     Dear Colleague,    Thank you for referring your patient, Sharon Waters, to the [facility] NEUROSURGERY CLINIC [place] [facility]. Please see a copy of my visit note below.    NEUROSURGERY CLINIC NOTE       Reason for visit:            Follow ER visit-  Lower abdominal pain, episodic low back pain       History of present illness:  Sharon Waters is a very pleasant 46 year old female with unremarkable past medical history who is seen following ED visit   On 11/07/2021.  She presented to the ER for lower abdominal radiating down into the rectum on 11/07/2021.  Patient did have a CTA of the abdomen which showed no evidence of obstruction or vascular abnormality. No evidence of true abdominal infection. She has no evidence of ureterolithiasis. She was noted to have a 2.7 cm perineural cyst abutting the left S1-S2 nerve root.  Neurosurgery was sideline consulted but did not feel that her pain/presentation was consistent with the finding of sacral tarlov cyst, but recommended outpatient follow up.   Today, Overall she is now doing very well.   She is not having lower abdominal pain.   She has not had a re-occurrence of the significant abdominal pain  Her pain/symptoms were thought to be r/t her IUD placement, possible malalignment   She did subsequently have the IUD removed, and pain/symptoms improved.    She states that she has history of a \"tailbone injury\"  In 2001 she slipped on the ice and \"flew down\" several steps and   Landed on her tailbone.   She has had some chronic issues w/her tailbone since that time.   She was an avid swimmer in the past.   She also has participated in physical therapy in the past.   She also has had two children (childbirth) and had some increased back discomfort at that time.    She can now occasionally get some discomfort, but this is " not constant,   And very episodic.   No bilateral lower extremity radiating pain, numbness, tingling.    No lower extremity weakness.          Review of systems: 10 point ROS negative except for as detailed in HPI  Past Medical History:   Past Medical History:   Diagnosis Date     Uncomplicated asthma 1992    viral induced     Surgical History:   Past Surgical History:   Procedure Laterality Date     NO HISTORY OF SURGERY       Medications:  Current Outpatient Medications   Medication     cetirizine (ZYRTEC) 5 MG tablet     Cholecalciferol (VITAMIN D3 PO)     fluticasone (FLONASE) 50 MCG/ACT nasal spray     ibuprofen (ADVIL/MOTRIN) 200 MG tablet     levonorgestrel (MIRENA) 20 MCG/24HR IUD -  Patient has had removed.      Multiple Vitamins-Minerals (ADULT ONE DAILY GUMMIES PO)     rizatriptan (MAXALT) 10 MG tablet     No current facility-administered medications for this visit.       Social History     Tobacco Use     Smoking status: Never Smoker     Smokeless tobacco: Never Used   Substance Use Topics     Alcohol use: Yes     Comment: 1-2 per week     Drug use: No       Family History   Problem Relation Age of Onset     Hypertension Mother      Colon Cancer Maternal Grandmother      Diabetes Paternal Grandmother      Breast Cancer Paternal Grandmother        Physical exam:          There were no vitals taken for this visit.    General    Alert, cooperative.  No acute distress  Pulmonary:   Breathing comfortably on room air. No cough, or shortness of breath  Skin:    Visible skin without lesions or obvious rash  Speech is fluent  Maintains eye contact  Musculoskeletal:    Moving extremities freely with good strength      Imaging:  MR LUMBAR SPINE W/O CONTRAST 11/22/2021 10:18 AM     Findings: There are 5 lumbar-type vertebrae.  The tip of the conus  medullaris is at T12-L1.  Normal lumbar vertebral alignment.  There is  no significant disc height narrowing.  Normal marrow signal.  Incidental sacral Tarlov cysts  centered at S1 bilaterally, left  greater than right, unchanged.     On a level by level basis:     T12-L1: No spinal canal or neuroforaminal stenosis.     L1-2: No spinal canal or neuroforaminal stenosis.     L2-3: No spinal canal or neuroforaminal stenosis.     L3-4: No spinal canal or neuroforaminal stenosis.     L4-5: No spinal canal or neuroforaminal stenosis.     L5-S1: Bilateral facet hypertrophy. No spinal canal or neural  foraminal stenosis.     Paraspinous tissues are within normal limits.                                                                      Impression:   1. Mild lower lumbar degenerative changes without spinal canal or  neural foraminal stenosis.  2. Incidental bilateral sacral Tarlov/perineural cysts, left greater  than right, of doubtful clinical significance and unchanged since  11/7/2021.     LENNIE WALLS MD     Assessment:  ~Resolved lower abdominal pain, radiating to rectum  ~Occas low back/tailbone discomfort, improved.   ~Incidental bilateral sacral Tarlov/perineural cysts, left greater  than right  ~Reassured that no neurosurgical intervention is warranted at this time.        Plan:  ~Patient is reassured.   ~If she develops new constant pain, recommend evaluation by orthopedic spine surgeon (Sacral region), but  explained that typically these cysts are non-surgical   ~Unless the patient develops new pain or symptoms, no further imaging is required, and at this time, can be discharged from the Neurosurgery Clinic, and can return on an as-needed basis      At the end of the visit, all the patient's questions and concerns had been addressed and the patient was agreeable with the plan of care as outlined above. The patient has our office contact information at [phone], and knows to call with any questions, concerns, or changes in condition.        Tiffanie Stevenson DNP  Neurosurgery Nurse Practitioner  [facility]  [phone]

## 2022-01-24 NOTE — PROGRESS NOTES
"NEUROSURGERY CLINIC NOTE       Reason for visit:            Follow ER visit-  Lower abdominal pain, episodic low back pain       History of present illness:  Sharon Waters is a very pleasant 46 year old female with unremarkable past medical history who is seen following ED visit   On 11/07/2021.  She presented to the ER for lower abdominal radiating down into the rectum on 11/07/2021.  Patient did have a CTA of the abdomen which showed no evidence of obstruction or vascular abnormality. No evidence of true abdominal infection. She has no evidence of ureterolithiasis. She was noted to have a 2.7 cm perineural cyst abutting the left S1-S2 nerve root.  Neurosurgery was sideline consulted but did not feel that her pain/presentation was consistent with the finding of sacral tarlov cyst, but recommended outpatient follow up.   Today, Overall she is now doing very well.   She is not having lower abdominal pain.   She has not had a re-occurrence of the significant abdominal pain  Her pain/symptoms were thought to be r/t her IUD placement, possible malalignment   She did subsequently have the IUD removed, and pain/symptoms improved.    She states that she has history of a \"tailbone injury\"  In 2001 she slipped on the ice and \"flew down\" several steps and   Landed on her tailbone.   She has had some chronic issues w/her tailbone since that time.   She was an avid swimmer in the past.   She also has participated in physical therapy in the past.   She also has had two children (childbirth) and had some increased back discomfort at that time.    She can now occasionally get some discomfort, but this is not constant,   And very episodic.   No bilateral lower extremity radiating pain, numbness, tingling.    No lower extremity weakness.          Review of systems: 10 point ROS negative except for as detailed in HPI  Past Medical History:   Past Medical History:   Diagnosis Date     Uncomplicated asthma 1992    viral induced "     Surgical History:   Past Surgical History:   Procedure Laterality Date     NO HISTORY OF SURGERY       Medications:  Current Outpatient Medications   Medication     cetirizine (ZYRTEC) 5 MG tablet     Cholecalciferol (VITAMIN D3 PO)     fluticasone (FLONASE) 50 MCG/ACT nasal spray     ibuprofen (ADVIL/MOTRIN) 200 MG tablet     levonorgestrel (MIRENA) 20 MCG/24HR IUD -  Patient has had removed.      Multiple Vitamins-Minerals (ADULT ONE DAILY GUMMIES PO)     rizatriptan (MAXALT) 10 MG tablet     No current facility-administered medications for this visit.       Social History     Tobacco Use     Smoking status: Never Smoker     Smokeless tobacco: Never Used   Substance Use Topics     Alcohol use: Yes     Comment: 1-2 per week     Drug use: No       Family History   Problem Relation Age of Onset     Hypertension Mother      Colon Cancer Maternal Grandmother      Diabetes Paternal Grandmother      Breast Cancer Paternal Grandmother        Physical exam:          There were no vitals taken for this visit.    General    Alert, cooperative.  No acute distress  Pulmonary:   Breathing comfortably on room air. No cough, or shortness of breath  Skin:    Visible skin without lesions or obvious rash  Speech is fluent  Maintains eye contact  Musculoskeletal:    Moving extremities freely with good strength      Imaging:  MR LUMBAR SPINE W/O CONTRAST 11/22/2021 10:18 AM     Findings: There are 5 lumbar-type vertebrae.  The tip of the conus  medullaris is at T12-L1.  Normal lumbar vertebral alignment.  There is  no significant disc height narrowing.  Normal marrow signal.  Incidental sacral Tarlov cysts centered at S1 bilaterally, left  greater than right, unchanged.     On a level by level basis:     T12-L1: No spinal canal or neuroforaminal stenosis.     L1-2: No spinal canal or neuroforaminal stenosis.     L2-3: No spinal canal or neuroforaminal stenosis.     L3-4: No spinal canal or neuroforaminal stenosis.     L4-5: No  spinal canal or neuroforaminal stenosis.     L5-S1: Bilateral facet hypertrophy. No spinal canal or neural  foraminal stenosis.     Paraspinous tissues are within normal limits.                                                                      Impression:   1. Mild lower lumbar degenerative changes without spinal canal or  neural foraminal stenosis.  2. Incidental bilateral sacral Tarlov/perineural cysts, left greater  than right, of doubtful clinical significance and unchanged since  11/7/2021.     LENNIE WALLS MD     Assessment:  ~Resolved lower abdominal pain, radiating to rectum  ~Occas low back/tailbone discomfort, improved.   ~Incidental bilateral sacral Tarlov/perineural cysts, left greater  than right  ~Reassured that no neurosurgical intervention is warranted at this time.        Plan:  ~Patient is reassured.   ~If she develops new constant pain, recommend evaluation by orthopedic spine surgeon (Sacral region), but  explained that typically these cysts are non-surgical   ~Unless the patient develops new pain or symptoms, no further imaging is required, and at this time, can be discharged from the Neurosurgery Clinic, and can return on an as-needed basis      At the end of the visit, all the patient's questions and concerns had been addressed and the patient was agreeable with the plan of care as outlined above. The patient has our office contact information at [phone], and knows to call with any questions, concerns, or changes in condition.        Tiffanie Stevenson DNP  Neurosurgery Nurse Practitioner  [facility]  [phone]

## 2022-01-24 NOTE — PROGRESS NOTES
Sharon is a 46 year old who is being evaluated via a billable video visit.      How would you like to obtain your AVS? MyChart  If the video visit is dropped, the invitation should be resent by: Send to e-mail at: [email or web address]  Will anyone else be joining your video visit? No      Video Start Time:     4:42 pm   Video-Visit Details    Type of service:  Video Visit    Video End Time:   5:00 pm   Originating Location (pt. Location): Home    Distant Location (provider location):  [facility] NEUROSURGERY [facility]     Platform used for Video Visit: [employer]

## 2022-01-25 NOTE — PATIENT INSTRUCTIONS
~Patient is reassured.   ~If she develops new constant pain, recommend evaluation by orthopedic spine surgeon (Sacral region), but  explained that typically these cysts are non-surgical   ~Unless the patient develops new pain or symptoms, no further imaging is required, and at this time, can be discharged from the Neurosurgery Clinic, and can return on an as-needed basis      At the end of the visit, all the patient's questions and concerns had been addressed and the patient was agreeable with the plan of care as outlined above. The patient has our office contact information at [phone], and knows to call with any questions, concerns, or changes in condition.

## 2022-03-05 ASSESSMENT — ENCOUNTER SYMPTOMS
ARTHRALGIAS: 0
FREQUENCY: 0
CONSTIPATION: 0
HEADACHES: 0
HEMATOCHEZIA: 0
JOINT SWELLING: 0
CHILLS: 0
EYE PAIN: 0
NAUSEA: 0
SHORTNESS OF BREATH: 0
HEMATURIA: 0
COUGH: 0
DYSURIA: 0
DIARRHEA: 0
NERVOUS/ANXIOUS: 0
BREAST MASS: 0
FEVER: 0
DIZZINESS: 0
MYALGIAS: 0
ABDOMINAL PAIN: 0
SORE THROAT: 0
WEAKNESS: 0
PARESTHESIAS: 0
HEARTBURN: 0
PALPITATIONS: 0

## 2022-03-07 ASSESSMENT — ENCOUNTER SYMPTOMS
WEAKNESS: 0
COUGH: 0
HEMATURIA: 0
JOINT SWELLING: 0
NAUSEA: 0
DIARRHEA: 0
PALPITATIONS: 0
MYALGIAS: 0
BREAST MASS: 0
NERVOUS/ANXIOUS: 0
EYE PAIN: 0
CHILLS: 0
HEARTBURN: 0
DYSURIA: 0
DIZZINESS: 0
FEVER: 0
HEADACHES: 0
ABDOMINAL PAIN: 0
SHORTNESS OF BREATH: 0
ARTHRALGIAS: 0
CONSTIPATION: 0
SORE THROAT: 0
FREQUENCY: 0
HEMATOCHEZIA: 0
PARESTHESIAS: 0

## 2022-03-07 NOTE — PROGRESS NOTES
SUBJECTIVE:   CC: Sharon Waters is an 46 year old woman who presents for preventive health visit.       Patient has been advised of split billing requirements and indicates understanding: Yes  Healthy Habits:     Getting at least 3 servings of Calcium per day:  Yes    Bi-annual eye exam:  NO    Dental care twice a year:  Yes    Sleep apnea or symptoms of sleep apnea:  None    Diet:  Regular (no restrictions) and Breakfast skipped    Frequency of exercise:  2-3 days/week    Duration of exercise:  30-45 minutes    Taking medications regularly:  Yes    Medication side effects:  None    PHQ-2 Total Score: 0    Additional concerns today:  No    Patient is Fasting for labs today   Would like the panel from last year repeated.   IUD removed in ER, period returned 3/2/22  New IUD was placed, she had abdominal pain and she removed it, pain resolved after iud  Condoms fow now  She has her 2 pregnancy's 2 twins and one single delivers    Incidentally sacral tarlov cyst-she saw neurosugery    Colon polyp in  2026    Snoring has stopped , joints feel better, lost weight  She does not need to see then no need to be seen  Today's PHQ-2 Score:   PHQ-2 ( 1999 Pf[employer]) 3/5/2022   Q1: Little interest or pleasure in doing things 0   Q2: Feeling down, depressed or hopeless 0   PHQ-2 Score 0   PHQ-2 Total Score (12-17 Years)- Positive if 3 or more points; Administer PHQ-A if positive -   Q1: Little interest or pleasure in doing things Not at all   Q2: Feeling down, depressed or hopeless Not at all   PHQ-2 Score 0       Abuse: Current or Past (Physical, Sexual or Emotional) - No  Do you feel safe in your environment? Yes    Have you ever done Advance Care Planning? (For example, a Health Directive, POLST, or a discussion with a medical provider or your loved ones about your wishes): No, advance care planning information given to patient to review.  Patient plans to discuss their wishes with loved ones or provider.      Social  History     Tobacco Use     Smoking status: Never Smoker     Smokeless tobacco: Never Used   Substance Use Topics     Alcohol use: Yes     Comment: 1-2 per week         Alcohol Use 3/5/2022   Prescreen: >3 drinks/day or >7 drinks/week? No       Reviewed orders with patient.  Reviewed health maintenance and updated orders accordingly - Yes  BP Readings from Last 3 Encounters:   03/08/22 104/64   11/07/21 102/67   10/01/21 119/68    Wt Readings from Last 3 Encounters:   03/08/22 80.3 kg (177 lb)   10/01/21 78.4 kg (172 lb 14.4 oz)   03/05/21 83.5 kg (184 lb)                    Breast Cancer Screening:    FHS-7:   Breast CA Risk Assessment (FHS-7) 9/23/2021 3/5/2022   Did any of your first-degree relatives have breast or ovarian cancer? No No   Did any of your relatives have bilateral breast cancer? No No   Did any man in your family have breast cancer? No No   Did any woman in your family have breast and ovarian cancer? No No   Did any woman in your family have breast cancer before age 50 y? No No   Do you have 2 or more relatives with breast and/or ovarian cancer? No No   Do you have 2 or more relatives with breast and/or bowel cancer? No No     click delete button to remove this line now  Mammogram Screening: Recommended annual mammography  Pertinent mammograms are reviewed under the imaging tab.    History of abnormal Pap smear: NO - age 30-65 PAP every 5 years with negative HPV co-testing recommended  PAP / HPV Latest Ref Rng & Units 10/1/2021 9/27/2018 4/28/2015   PAP   Negative for Intraepithelial Lesion or Malignancy (NILM) - -   PAP (Historical) - - NIL NIL   HPV16 Negative Negative Negative Negative   HPV18 Negative Negative Negative Negative   HRHPV Negative Negative Negative Negative     Reviewed and updated as needed this visit by clinical staff   Tobacco  Allergies  Meds   Med Hx  Surg Hx  Fam Hx  Soc Hx        Reviewed and updated as needed this visit by Provider                 Past Medical  History:   Diagnosis Date     Uncomplicated asthma     viral induced      Past Surgical History:   Procedure Laterality Date     COLONOSCOPY N/A 3/5/2021    Procedure: COLONOSCOPY, WITH POLYPECTOMY;  Surgeon: Jesse Asencio MD;  Location: [facility] OR     NO HISTORY OF SURGERY       OB History    Para Term  AB Living   3 3 3 0 0 3   SAB IAB Ectopic Multiple Live Births   0 0 0 0 3      # Outcome Date GA Lbr Josh/2nd Weight Sex Delivery Anes PTL Lv   3 Term         YISSEL   2 Term         YISSEL   1 Term         YISSEL       Review of Systems   Constitutional: Negative for chills and fever.   HENT: Negative for congestion, ear pain, hearing loss and sore throat.    Eyes: Negative for pain and visual disturbance.   Respiratory: Negative for cough and shortness of breath.    Cardiovascular: Negative for chest pain, palpitations and peripheral edema.   Gastrointestinal: Negative for abdominal pain, constipation, diarrhea, heartburn, hematochezia and nausea.   Breasts:  Negative for tenderness, breast mass and discharge.   Genitourinary: Negative for dysuria, frequency, genital sores, hematuria, pelvic pain, urgency, vaginal bleeding and vaginal discharge.   Musculoskeletal: Negative for arthralgias, joint swelling and myalgias.   Skin: Negative for rash.   Neurological: Negative for dizziness, weakness, headaches and paresthesias.   Psychiatric/Behavioral: Negative for mood changes. The patient is not nervous/anxious.      CONSTITUTIONAL: NEGATIVE for fever, chills, change in weight  INTEGUMENTARU/SKIN: NEGATIVE for worrisome rashes, moles or lesions  EYES: NEGATIVE for vision changes or irritation  ENT: NEGATIVE for ear, mouth and throat problems  RESP: NEGATIVE for significant cough or SOB  BREAST: NEGATIVE for masses, tenderness or discharge  CV: NEGATIVE for chest pain, palpitations or peripheral edema  GI: NEGATIVE for nausea, abdominal pain, heartburn, or change in bowel habits  : NEGATIVE  "for unusual urinary or vaginal symptoms. Periods are regular.  MUSCULOSKELETAL: NEGATIVE for significant arthralgias or myalgia  NEURO: NEGATIVE for weakness, dizziness or paresthesias  PSYCHIATRIC: NEGATIVE for changes in mood or affect     OBJECTIVE:   /64   Pulse 72   Temp 98  F (36.7  C) (Oral)   Resp 16   Ht 1.753 m (5' 9\")   Wt 80.3 kg (177 lb)   LMP 03/02/2022   SpO2 100%   BMI 26.14 kg/m    Physical Exam  GENERAL: healthy, alert and no distress  EYES: Eyes grossly normal to inspection, PERRL and conjunctivae and sclerae normal  HENT: ear canals and TM's normal, nose and mouth without ulcers or lesions  NECK: no adenopathy, no asymmetry, masses, or scars and thyroid normal to palpation  RESP: lungs clear to auscultation - no rales, rhonchi or wheezes  BREAST: normal without masses, tenderness or nipple discharge and no palpable axillary masses or adenopathy  CV: regular rate and rhythm, normal S1 S2, no S3 or S4, no murmur, click or rub, no peripheral edema and peripheral pulses strong  ABDOMEN: soft, nontender, no hepatosplenomegaly, no masses and bowel sounds normal   (female): declined  MS: no gross musculoskeletal defects noted, no edema  SKIN: no suspicious lesions or rashes  NEURO: Normal strength and tone, mentation intact and speech normal  PSYCH: mentation appears normal, affect normal/bright    Diagnostic Test Results:  Labs reviewed in Epic    ASSESSMENT/PLAN:       ICD-10-CM    1. Routine general medical examination at a health care facility  Z00.00 Hemoglobin     Hemoglobin   2. History of colonic polyps  Z86.010    3. Encounter for screening mammogram for breast cancer  Z12.31 *MA Screening Digital Bilateral   4. Screening for lipoid disorders  Z13.220 Lipid panel reflex to direct LDL Fasting     Lipid panel reflex to direct LDL Fasting   5. Screening for diabetes mellitus  Z13.1 Basic metabolic panel  (Ca, Cl, CO2, Creat, Gluc, K, Na, BUN)     Basic metabolic panel  (Ca, Cl, " "CO2, Creat, Gluc, K, Na, BUN)   6. Immunity status testing  Z01.84 Hepatitis B Surface Antibody     Hepatitis B surface antigen     Hepatitis B Surface Antibody     Hepatitis B surface antigen   family history of colon cancer, recently had colonoscopy, showed colon polyp, advised follow up with GI in 5 years, per patient she was told by GI specialist, 3 years but no documentation of that was found, she will contact GI    She declined gyn exam, goes to obgyn, she will have IUD replaced    screening labs to be done    Hep A updated,  Hep B status check  Get fasting labs  mammo this year  Follow up next year  Continue lifestyle and weight management  weight training as discussed for bone natalio  Vit JOYCE    Patient has been advised of split billing requirements and indicates understanding: Yes    COUNSELING:  Reviewed preventive health counseling, as reflected in patient instructions    Estimated body mass index is 26.14 kg/m  as calculated from the following:    Height as of this encounter: 1.753 m (5' 9\").    Weight as of this encounter: 80.3 kg (177 lb).    Weight management plan: Discussed healthy diet and exercise guidelines    She reports that she has never smoked. She has never used smokeless tobacco.      Counseling Resources:  ATP IV Guidelines  Pooled Cohorts Equation Calculator  Breast Cancer Risk Calculator  BRCA-Related Cancer Risk Assessment: FHS-7 Tool  FRAX Risk Assessment  ICSI Preventive Guidelines  Dietary Guidelines for Americans, 2010  USDA's MyPlate  ASA Prophylaxis  Lung CA Screening    DO WANDER [facility]    "

## 2022-03-07 NOTE — PATIENT INSTRUCTIONS
Get hep A today  Check for immunity for hep B  Get fasting labs  mammo this year  Follow up next year  Continue lifestyle and weight management  weight training as discussed for bone heallth  Vit D  Take care  Derrick Senior D.O.      Mammogram Scheduling  [place] [phone] or [phone]  [place] [phone]    Patient Education       Preventive Health Recommendations  Female Ages 40 to 49    Yearly exam:     See your health care provider every year in order to  1. Review health changes.   2. Discuss preventive care.    3. Review your medicines if your doctor prescribed any.      Get a Pap test every three years (unless you have an abnormal result and your provider advises testing more often).      If you get Pap tests with HPV test, you only need to test every 5 years, unless you have an abnormal result. You do not need a Pap test if your uterus was removed (hysterectomy) and you have not had cancer.      You should be tested each year for STDs (sexually transmitted diseases), if you're at risk.     Ask your doctor if you should have a mammogram.      Have a colonoscopy (test for colon cancer) if someone in your family has had colon cancer or polyps before age 50.       Have a cholesterol test every 5 years.       Have a diabetes test (fasting glucose) after age 45. If you are at risk for diabetes, you should have this test every 3 years.    Shots: Get a flu shot each year. Get a tetanus shot every 10 years.     Nutrition:     Eat at least 5 servings of fruits and vegetables each day.    Eat whole-grain bread, whole-wheat pasta and brown rice instead of white grains and rice.    Get adequate Calcium and Vitamin D.      Lifestyle    Exercise at least 150 minutes a week (an average of 30 minutes a day, 5 days a week). This will help you control your weight and prevent disease.    Limit alcohol to one drink per day.    No smoking.     Wear sunscreen to prevent skin cancer.    See your dentist every  six months for an exam and cleaning.  Preventive Health Recommendations  Female Ages 40 to 49    Yearly exam:   See your health care provider every year in order to  Review health changes.   Discuss preventive care.    Review your medicines if your doctor prescribed any.    Get a Pap test every three years (unless you have an abnormal result and your provider advises testing more often).    If you get Pap tests with HPV test, you only need to test every 5 years, unless you have an abnormal result. You do not need a Pap test if your uterus was removed (hysterectomy) and you have not had cancer.    You should be tested each year for STDs (sexually transmitted diseases), if you're at risk.   Ask your doctor if you should have a mammogram.    Have a colonoscopy (test for colon cancer) if someone in your family has had colon cancer or polyps before age 50.     Have a cholesterol test every 5 years.     Have a diabetes test (fasting glucose) after age 45. If you are at risk for diabetes, you should have this test every 3 years.    Shots: Get a flu shot each year. Get a tetanus shot every 10 years.     Nutrition:   Eat at least 5 servings of fruits and vegetables each day.  Eat whole-grain bread, whole-wheat pasta and brown rice instead of white grains and rice.  Get adequate Calcium and Vitamin D.      Lifestyle  Exercise at least 150 minutes a week (an average of 30 minutes a day, 5 days a week). This will help you control your weight and prevent disease.  Limit alcohol to one drink per day.  No smoking.   Wear sunscreen to prevent skin cancer.  See your dentist every six months for an exam and cleaning.

## 2022-03-08 ENCOUNTER — OFFICE VISIT (OUTPATIENT)
Dept: FAMILY MEDICINE | Facility: CLINIC | Age: 47
End: 2022-03-08
Payer: COMMERCIAL

## 2022-03-08 VITALS
HEART RATE: 72 BPM | TEMPERATURE: 98 F | WEIGHT: 177 LBS | DIASTOLIC BLOOD PRESSURE: 64 MMHG | SYSTOLIC BLOOD PRESSURE: 104 MMHG | BODY MASS INDEX: 26.22 KG/M2 | OXYGEN SATURATION: 100 % | RESPIRATION RATE: 16 BRPM | HEIGHT: 69 IN

## 2022-03-08 DIAGNOSIS — Z00.00 ROUTINE GENERAL MEDICAL EXAMINATION AT A HEALTH CARE FACILITY: Primary | ICD-10-CM

## 2022-03-08 DIAGNOSIS — Z86.0100 HISTORY OF COLONIC POLYPS: ICD-10-CM

## 2022-03-08 DIAGNOSIS — Z01.84 IMMUNITY STATUS TESTING: ICD-10-CM

## 2022-03-08 DIAGNOSIS — Z12.31 ENCOUNTER FOR SCREENING MAMMOGRAM FOR BREAST CANCER: ICD-10-CM

## 2022-03-08 DIAGNOSIS — Z13.1 SCREENING FOR DIABETES MELLITUS: ICD-10-CM

## 2022-03-08 DIAGNOSIS — Z13.220 SCREENING FOR LIPOID DISORDERS: ICD-10-CM

## 2022-03-08 LAB — HGB BLD-MCNC: 13.5 G/DL (ref 11.7–15.7)

## 2022-03-08 PROCEDURE — 85018 HEMOGLOBIN: CPT | Performed by: FAMILY MEDICINE

## 2022-03-08 PROCEDURE — 36415 COLL VENOUS BLD VENIPUNCTURE: CPT | Performed by: FAMILY MEDICINE

## 2022-03-08 PROCEDURE — 90471 IMMUNIZATION ADMIN: CPT | Performed by: FAMILY MEDICINE

## 2022-03-08 PROCEDURE — 87340 HEPATITIS B SURFACE AG IA: CPT | Performed by: FAMILY MEDICINE

## 2022-03-08 PROCEDURE — 99396 PREV VISIT EST AGE 40-64: CPT | Mod: 25 | Performed by: FAMILY MEDICINE

## 2022-03-08 PROCEDURE — 86706 HEP B SURFACE ANTIBODY: CPT | Performed by: FAMILY MEDICINE

## 2022-03-08 PROCEDURE — 80061 LIPID PANEL: CPT | Performed by: FAMILY MEDICINE

## 2022-03-08 PROCEDURE — 80048 BASIC METABOLIC PNL TOTAL CA: CPT | Performed by: FAMILY MEDICINE

## 2022-03-08 PROCEDURE — 90632 HEPA VACCINE ADULT IM: CPT | Performed by: FAMILY MEDICINE

## 2022-03-08 ASSESSMENT — PAIN SCALES - GENERAL: PAINLEVEL: NO PAIN (0)

## 2022-03-09 ENCOUNTER — MYC MEDICAL ADVICE (OUTPATIENT)
Dept: FAMILY MEDICINE | Facility: CLINIC | Age: 47
End: 2022-03-09
Payer: COMMERCIAL

## 2022-03-09 LAB
ANION GAP SERPL CALCULATED.3IONS-SCNC: 6 MMOL/L (ref 3–14)
BUN SERPL-MCNC: 16 MG/DL (ref 7–30)
CALCIUM SERPL-MCNC: 9.1 MG/DL (ref 8.5–10.1)
CHLORIDE BLD-SCNC: 108 MMOL/L (ref 94–109)
CHOLEST SERPL-MCNC: 172 MG/DL
CO2 SERPL-SCNC: 27 MMOL/L (ref 20–32)
CREAT SERPL-MCNC: 1.03 MG/DL (ref 0.52–1.04)
FASTING STATUS PATIENT QL REPORTED: YES
GFR SERPL CREATININE-BSD FRML MDRD: 67 ML/MIN/1.73M2
GLUCOSE BLD-MCNC: 96 MG/DL (ref 70–99)
HBV SURFACE AB SERPL IA-ACNC: 23.65 M[IU]/ML
HBV SURFACE AG SERPL QL IA: NONREACTIVE
HDLC SERPL-MCNC: 57 MG/DL
LDLC SERPL CALC-MCNC: 106 MG/DL
NONHDLC SERPL-MCNC: 115 MG/DL
POTASSIUM BLD-SCNC: 4.2 MMOL/L (ref 3.4–5.3)
SODIUM SERPL-SCNC: 141 MMOL/L (ref 133–144)
TRIGL SERPL-MCNC: 47 MG/DL

## 2022-03-09 NOTE — TELEPHONE ENCOUNTER
RN called and spoke with patient.    RN discussed with patient lab drawn yesturday was for Hepatitis B, not C.    Patient verbalized understanding and is ok with proceeding with lab.    Shad Baez, RN, BSN, PHN  [facility]

## 2022-03-10 NOTE — RESULT ENCOUNTER NOTE
All your results are essentially sky. Please contact me if you have any questions.  Take care,  Derrick Senior D.O.

## 2022-09-08 ENCOUNTER — OFFICE VISIT (OUTPATIENT)
Dept: OBGYN | Facility: CLINIC | Age: 47
End: 2022-09-08
Payer: COMMERCIAL

## 2022-09-08 VITALS
BODY MASS INDEX: 28.06 KG/M2 | SYSTOLIC BLOOD PRESSURE: 129 MMHG | DIASTOLIC BLOOD PRESSURE: 75 MMHG | WEIGHT: 190 LBS | HEART RATE: 80 BPM | OXYGEN SATURATION: 98 %

## 2022-09-08 DIAGNOSIS — Z30.430 ENCOUNTER FOR IUD INSERTION: ICD-10-CM

## 2022-09-08 DIAGNOSIS — Z23 NEED FOR PROPHYLACTIC VACCINATION AND INOCULATION AGAINST INFLUENZA: ICD-10-CM

## 2022-09-08 DIAGNOSIS — Z30.430 ENCOUNTER FOR INSERTION OF INTRAUTERINE CONTRACEPTIVE DEVICE: Primary | ICD-10-CM

## 2022-09-08 LAB — HCG UR QL: NEGATIVE

## 2022-09-08 PROCEDURE — 81025 URINE PREGNANCY TEST: CPT | Performed by: OBSTETRICS & GYNECOLOGY

## 2022-09-08 PROCEDURE — 58300 INSERT INTRAUTERINE DEVICE: CPT | Performed by: OBSTETRICS & GYNECOLOGY

## 2022-09-08 PROCEDURE — 90686 IIV4 VACC NO PRSV 0.5 ML IM: CPT | Performed by: OBSTETRICS & GYNECOLOGY

## 2022-09-08 PROCEDURE — 90471 IMMUNIZATION ADMIN: CPT | Performed by: OBSTETRICS & GYNECOLOGY

## 2022-09-08 NOTE — PROGRESS NOTES
IUD Insertion:  CONSULT:    Is a pregnancy test required: Yes.  Was it positive or negative?  Negative  Was a consent obtained?  Yes    Subjective: Sharon Waters is a 46 year old  presents for IUD and desires Mirena type IUD.    Patient has been given the opportunity to ask questions about all forms of birth control, including all options appropriate for Sharon Watres. Discussed that no method of birth control, except abstinence is 100% effective against pregnancy or sexually transmitted infection.     Sharon Waters understands she may have the IUD removed at any time. IUD should be removed by a health care provider.    The entire insertion procedure was reviewed with the patient, including care after placement.    No LMP recorded. Has current contraception. No allergy to betadine or shellfish. Patient declines STD screening  HCG Qual Urine   Date Value Ref Range Status   2021 Negative neg Final     hCG Urine Qualitative   Date Value Ref Range Status   2022 Negative Negative Final     Comment:     This test is for screening purposes.  Results should be interpreted along with the clinical picture.  Confirmation testing is available if warranted by ordering [id number], HCG Quantitative Pregnancy.         /75   Pulse 80   Wt 86.2 kg (190 lb)   SpO2 98%   BMI 28.06 kg/m      Pelvic Exam:   EG/BUS: normal genital architecture without lesions, erythema or abnormal secretions.   Vagina: moist, pink, rugae with physiologic discharge and secretions  Cervix: parous no lesions and pink, moist, closed, without lesion or CMT  Uterus: retroverted position, mobile, no pain  Adnexa: within normal limits and no masses, nodularity, tenderness    PROCEDURE NOTE: -- IUD Insertion    Reason for Insertion: contraception    Premedicated with ibuprofen.  Under sterile technique, cervix was visualized with speculum and prepped with Betadine solution swab x 3. Tenaculum was placed for stability. The  uterus was gently straightened and sounded to 8.0 cm. IUD prepared for placement, and IUD inserted according to 's instructions without difficulty or significant resitance, and deployed at the fundus. The strings were visualized and trimmed to 3.5 cm from the external os. Tenaculum was removed and hemostasis noted. Speculum removed.  Patient tolerated procedure well.    EBL: minimal    Complications: none    ASSESSMENT:     ICD-10-CM    1. Encounter for IUD insertion  Z30.430 HCG qualitative urine     HCG qualitative urine   2. Need for prophylactic vaccination and inoculation against influenza  Z23 INFLUENZA VACCINE IM > 6 MONTHS VALENT IIV4 (AFLURIA/FLUZONE)   3. Encounter for insertion of intrauterine contraceptive device  Z30.430         PLAN:    Given 's handouts, including when to have IUD removed, list of danger s/sx, side effects and follow up recommended. Encouraged condom use for prevention of STD. Back up contraception advised for 7 days if progestin method. Advised to call for any fever, for prolonged or severe pain or bleeding, abnormal vaginal discharge, or unable to palpate strings. She was advised to use pain medications (ibuprofen) as needed for mild to moderate pain. Advised to follow-up in clinic in 4-6 weeks for IUD string check if unable to find strings or as directed by provider.     Ivelisse Brantley MD

## 2022-09-08 NOTE — PATIENT INSTRUCTIONS
After having an IUD placed it is normal to have spotting and cramping, you can take Ibuprofen or Tylenol according to the instructions on the bottle and use a heating pad to the lower abdomen as needed.     Please avoid placing anything in the vagina (tampons, intercourse, etc) for 72 hours. The progesterone IUD takes 7 days to be effective for pregnancy prevention so please use condoms for back up contraception if you have intercourse before the 7 day brittany but the copper (ParaGard IUD) is effective the same day of placement.     Please call [phone] with any severe abdominal pain, heavy vaginal bleeding, fevers or foul smelling vaginal discharge.     You should check your IUD strings in 2 weeks by placing one or two fingers inside the vagina as high up as you can reach, you should be able to feel the IUD strings (which feel like fishing line), if you can't feel your strings or don't feel comfortable checking yourself you can call clinic to schedule an IUD check.

## 2022-09-27 ENCOUNTER — ANCILLARY PROCEDURE (OUTPATIENT)
Dept: MAMMOGRAPHY | Facility: CLINIC | Age: 47
End: 2022-09-27
Attending: FAMILY MEDICINE
Payer: COMMERCIAL

## 2022-09-27 DIAGNOSIS — Z12.31 VISIT FOR SCREENING MAMMOGRAM: ICD-10-CM

## 2022-09-27 DIAGNOSIS — Z12.31 ENCOUNTER FOR SCREENING MAMMOGRAM FOR BREAST CANCER: ICD-10-CM

## 2022-09-27 PROCEDURE — 77067 SCR MAMMO BI INCL CAD: CPT

## 2022-09-27 PROCEDURE — 77063 BREAST TOMOSYNTHESIS BI: CPT | Mod: 26 | Performed by: RADIOLOGY

## 2022-09-27 PROCEDURE — 77067 SCR MAMMO BI INCL CAD: CPT | Mod: 26 | Performed by: RADIOLOGY

## 2022-09-30 ENCOUNTER — MYC MEDICAL ADVICE (OUTPATIENT)
Dept: FAMILY MEDICINE | Facility: CLINIC | Age: 47
End: 2022-09-30

## 2022-10-28 ENCOUNTER — VIRTUAL VISIT (OUTPATIENT)
Dept: NEUROLOGY | Facility: CLINIC | Age: 47
End: 2022-10-28
Payer: COMMERCIAL

## 2022-10-28 DIAGNOSIS — G62.9 NEUROPATHY: Primary | ICD-10-CM

## 2022-10-28 DIAGNOSIS — R26.89 IMBALANCE: ICD-10-CM

## 2022-10-28 DIAGNOSIS — R25.3 FASCICULATIONS: ICD-10-CM

## 2022-10-28 DIAGNOSIS — G43.109 MIGRAINE WITH AURA AND WITHOUT STATUS MIGRAINOSUS, NOT INTRACTABLE: ICD-10-CM

## 2022-10-28 PROCEDURE — 99215 OFFICE O/P EST HI 40 MIN: CPT | Mod: 95 | Performed by: PSYCHIATRY & NEUROLOGY

## 2022-10-28 RX ORDER — KETOCONAZOLE 20 MG/G
1 CREAM TOPICAL SEE ADMIN INSTRUCTIONS
COMMUNITY
Start: 2022-10-15

## 2022-10-28 NOTE — PROGRESS NOTES
[facility] Neurology Consultation    Sharon Waters MRN# [id number]   Age: 47 year old YOB: 1975     Requesting physician: Derrick Mcgowan     Reason for Consultation: neuropathy      History of Presenting Symptoms:   Sharon Waters is a 47 year old female who presents today for evaluation of neuropathy.    The patient was seen with migraine specialist 8/18/2021 for ongoing headache and migraines, but during the visit it was noted she had poor Romberg, and difficulties with balance that were progressing over time.  An MRI brain was recommended but not done.    The patient was then seen in Neurosurgery clinic 1/24/2022 for ongoing lower abdominal pain and episodic lower back pain.  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  The Tarlov cyst was not thought to be causing her lower back or abdominal pain.  At the time, it was noted that should her symptoms of pain in the abdomen return, then a referral to orthopedic surgeon could be considered.    Today, the patient tells me that her calves and feet are twitching.  This symptoms started around 9-10 months ago. She may be sitting down or laying down when it occurs. The symptoms only occur below the knee.  She notes that she contraction of her muscle groups of her first three toes, and calves.  These contractions are not painful.  There is no association with food.  The symptoms are present when waking up, but do not wake her up at sleep.  She does note having cramps in her feet/calves, but considers these separate than her newer contraction related issues.  She describes fasciculations (individual fibers moving in her muscles).       On a separate note, she describes some issues with going up stairs relating to imbalance/or body disorientation.  She feels like her body forgets where it is in the step progression. She finds that this leads  to stumbling on stairs.  This may be present for 8 years. It doesn't happen going down the stairs to the severity of going up stairs.    There is no cancer history.  There is no major autoimmune disease history.     Social History:   No major alcohol use. No smoking.      Medications:   Rizatriptan     Physical Exam:   General: Seated comfortably in no acute distress.  Neurologic:     Mental Status: Fully alert, attentive and oriented. Speech clear and fluent, no paraphasic errors.     Cranial Nerves: EOMI with normal smooth pursuit. Facial movements symmetric. Hearing not formally tested but intact to conversation.  No dysarthria.     Motor: No tremors or other abnormal movements observed.      Sensory: Negative Romberg today (no falls, slight sway).      Coordination: Finger-nose-finger without dysmetria.     Gait: Normal, steady casual gait. Tandem walkign forward and backward without falls         Data: Pertinent prior to visit   Imaging:  No brain imaging done         Assessment and Plan:   Assessment:  - Muscle fasciculations, cramps b/l lower extremities  - Migraines and imbalance    The patient describes quite well a condition of fasciculations of her lower extremities distal to the knees, occurring more frequently in the last few months and superimposed upon longstanding generalized cramps.  Some of her descriptions sound more like dyskinesia, but there isn't a trigger/stereotyped movement/sensory trigger or other issues of pain/relationship to food/relationship to a medication/relationship to a time of day.  Given lack of pertinent exam findings today, I would want her to undergo serum studies to look for neoplastic causes of dyskinesias, common causes of neuropathy, and common causes of atypical movements related to fasciculations.  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  Given her prior recommendation to have MRI brain was  never done, I would also think this could be done to complete her overall work-up in regards to imbalance and migraine.     Plan:  - Zinc, copper, ceruloplasmin, RPR, ESR, CRP, NOEMI, CBC, CMP, GAD65, SPEP, Immunofixation, ferritin, iron and iron binding  - EMG b/l lower  - MRI brain w/wout contrast    Follow up in Neurology clinic in 2-3 months, or should new concerns arise.    PRIYA Villarreal D.O.   of Neurology    Total time today (60 min) in this patient encounter was spent on pre-charting, counseling and/or coordination of care. . The patient is in agreement with this plan and has no further questions.

## 2022-10-28 NOTE — LETTER
10/28/2022       RE: Sharon Waters  [address]  [facility] [address]     Dear Colleague,    Thank you for referring your patient, Sharon Waters, to the [facility] NEUROLOGY CLINIC [place] at [facility]. Please see a copy of my visit note below.    [facility] Neurology Consultation    Sharon Waters MRN# [id number]   Age: 47 year old YOB: 1975     Requesting physician: Derrick Mcgowan     Reason for Consultation: neuropathy      History of Presenting Symptoms:   Sharon Waters is a 47 year old female who presents today for evaluation of neuropathy.    The patient was seen with migraine specialist 8/18/2021 for ongoing headache and migraines, but during the visit it was noted she had poor Romberg, and difficulties with balance that were progressing over time.  An MRI brain was recommended but not done.    The patient was then seen in Neurosurgery clinic 1/24/2022 for ongoing lower abdominal pain and episodic lower back pain.  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  The Tarlov cyst was not thought to be causing her lower back or abdominal pain.  At the time, it was noted that should her symptoms of pain in the abdomen return, then a referral to orthopedic surgeon could be considered.    Today, the patient tells me that her calves and feet are twitching.  This symptoms started around 9-10 months ago. She may be sitting down or laying down when it occurs. The symptoms only occur below the knee.  She notes that she contraction of her muscle groups of her first three toes, and calves.  These contractions are not painful.  There is no association with food.  The symptoms are present when waking up, but do not wake her up at sleep.  She does note having cramps in her feet/calves, but considers these separate than  her newer contraction related issues.  She describes fasciculations (individual fibers moving in her muscles).       On a separate note, she describes some issues with going up stairs relating to imbalance/or body disorientation.  She feels like her body forgets where it is in the step progression. She finds that this leads to stumbling on stairs.  This may be present for 8 years. It doesn't happen going down the stairs to the severity of going up stairs.    There is no cancer history.  There is no major autoimmune disease history.     Social History:   No major alcohol use. No smoking.      Medications:   Rizatriptan     Physical Exam:   General: Seated comfortably in no acute distress.  Neurologic:     Mental Status: Fully alert, attentive and oriented. Speech clear and fluent, no paraphasic errors.     Cranial Nerves: EOMI with normal smooth pursuit. Facial movements symmetric. Hearing not formally tested but intact to conversation.  No dysarthria.     Motor: No tremors or other abnormal movements observed.      Sensory: Negative Romberg today (no falls, slight sway).      Coordination: Finger-nose-finger without dysmetria.     Gait: Normal, steady casual gait. Tandem walkign forward and backward without falls         Data: Pertinent prior to visit   Imaging:  No brain imaging done         Assessment and Plan:   Assessment:  - Muscle fasciculations, cramps b/l lower extremities  - Migraines and imbalance    The patient describes quite well a condition of fasciculations of her lower extremities distal to the knees, occurring more frequently in the last few months and superimposed upon longstanding generalized cramps.  Some of her descriptions sound more like dyskinesia, but there isn't a trigger/stereotyped movement/sensory trigger or other issues of pain/relationship to food/relationship to a medication/relationship to a time of day.  Given lack of pertinent exam findings today, I would want her to undergo serum  studies to look for neoplastic causes of dyskinesias, common causes of neuropathy, and common causes of atypical movements related to fasciculations.  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  Given her prior recommendation to have MRI brain was never done, I would also think this could be done to complete her overall work-up in regards to imbalance and migraine.     Plan:  - Zinc, copper, ceruloplasmin, RPR, ESR, CRP, NOEMI, CBC, CMP, GAD65, SPEP, Immunofixation, ferritin, iron and iron binding  - EMG b/l lower  - MRI brain w/wout contrast    Follow up in Neurology clinic in 2-3 months, or should new concerns arise.      PRIYA Villarreal D.O.   of Neurology    Total time today (60 min) in this patient encounter was spent on pre-charting, counseling and/or coordination of care. . The patient is in agreement with this plan and has no further questions.

## 2022-10-28 NOTE — PROGRESS NOTES
Sharon is a 47 year old who is being evaluated via a billable video visit.      How would you like to obtain your AVS? MyChart  If the video visit is dropped, the invitation should be resent by: [phone]        Video-Visit Details    Video Start Time: 145    Type of service:  Video Visit    Video End Time:2:31 PM    Originating Location (pt. Location): Home        Distant Location (provider location):  On-site    Platform used for Video Visit: StefanieWell

## 2022-10-28 NOTE — PATIENT INSTRUCTIONS
I suspect you have a condition called cramp fasciculation syndrome, but you would need to have other testing to rule out other more serious medical conditions before considering treatment.    - Zinc, copper, ceruloplasmin, RPR, ESR, CRP, NOEMI, CBC, CMP, GAD65, SPEP, Immunofixation, ferritin, iron and iron binding, paraneoplastic panel  - EMG b/l lower  - MRI brain w/wout contrast    Follow up in Neurology clinic in 2-3 months, or should new concerns arise.

## 2023-01-09 ENCOUNTER — LAB (OUTPATIENT)
Dept: LAB | Facility: CLINIC | Age: 48
End: 2023-01-09
Payer: COMMERCIAL

## 2023-01-09 ENCOUNTER — ANCILLARY PROCEDURE (OUTPATIENT)
Dept: MRI IMAGING | Facility: CLINIC | Age: 48
End: 2023-01-09
Attending: PSYCHIATRY & NEUROLOGY
Payer: COMMERCIAL

## 2023-01-09 ENCOUNTER — OFFICE VISIT (OUTPATIENT)
Dept: NEUROLOGY | Facility: CLINIC | Age: 48
End: 2023-01-09
Attending: PSYCHIATRY & NEUROLOGY
Payer: COMMERCIAL

## 2023-01-09 DIAGNOSIS — G62.9 NEUROPATHY: ICD-10-CM

## 2023-01-09 DIAGNOSIS — R25.3 FASCICULATIONS: ICD-10-CM

## 2023-01-09 DIAGNOSIS — G43.109 MIGRAINE WITH AURA AND WITHOUT STATUS MIGRAINOSUS, NOT INTRACTABLE: ICD-10-CM

## 2023-01-09 DIAGNOSIS — R26.89 IMBALANCE: ICD-10-CM

## 2023-01-09 LAB
ALBUMIN SERPL BCG-MCNC: 4.5 G/DL (ref 3.5–5.2)
ALP SERPL-CCNC: 59 U/L (ref 35–104)
ALT SERPL W P-5'-P-CCNC: 17 U/L (ref 10–35)
ANION GAP SERPL CALCULATED.3IONS-SCNC: 10 MMOL/L (ref 7–15)
AST SERPL W P-5'-P-CCNC: 16 U/L (ref 10–35)
BASOPHILS # BLD AUTO: 0.1 10E3/UL (ref 0–0.2)
BASOPHILS NFR BLD AUTO: 1 %
BILIRUB SERPL-MCNC: 0.5 MG/DL
BUN SERPL-MCNC: 14.8 MG/DL (ref 6–20)
CALCIUM SERPL-MCNC: 9.4 MG/DL (ref 8.6–10)
CHLORIDE SERPL-SCNC: 103 MMOL/L (ref 98–107)
CREAT SERPL-MCNC: 0.84 MG/DL (ref 0.51–0.95)
CRP SERPL-MCNC: <3 MG/L
DEPRECATED HCO3 PLAS-SCNC: 27 MMOL/L (ref 22–29)
EOSINOPHIL # BLD AUTO: 0.3 10E3/UL (ref 0–0.7)
EOSINOPHIL NFR BLD AUTO: 4 %
ERYTHROCYTE [DISTWIDTH] IN BLOOD BY AUTOMATED COUNT: 12 % (ref 10–15)
ERYTHROCYTE [SEDIMENTATION RATE] IN BLOOD BY WESTERGREN METHOD: 5 MM/HR (ref 0–20)
FERRITIN SERPL-MCNC: 210 NG/ML (ref 6–175)
GFR SERPL CREATININE-BSD FRML MDRD: 85 ML/MIN/1.73M2
GLUCOSE SERPL-MCNC: 89 MG/DL (ref 70–99)
HCT VFR BLD AUTO: 39 % (ref 35–47)
HGB BLD-MCNC: 13.5 G/DL (ref 11.7–15.7)
IMM GRANULOCYTES # BLD: 0 10E3/UL
IMM GRANULOCYTES NFR BLD: 0 %
IRON BINDING CAPACITY (ROCHE): 283 UG/DL (ref 240–430)
IRON SATN MFR SERPL: 42 % (ref 15–46)
IRON SERPL-MCNC: 120 UG/DL (ref 37–145)
LYMPHOCYTES # BLD AUTO: 1.5 10E3/UL (ref 0.8–5.3)
LYMPHOCYTES NFR BLD AUTO: 23 %
MCH RBC QN AUTO: 30.8 PG (ref 26.5–33)
MCHC RBC AUTO-ENTMCNC: 34.6 G/DL (ref 31.5–36.5)
MCV RBC AUTO: 89 FL (ref 78–100)
MONOCYTES # BLD AUTO: 0.4 10E3/UL (ref 0–1.3)
MONOCYTES NFR BLD AUTO: 6 %
NEUTROPHILS # BLD AUTO: 4.4 10E3/UL (ref 1.6–8.3)
NEUTROPHILS NFR BLD AUTO: 66 %
NRBC # BLD AUTO: 0 10E3/UL
NRBC BLD AUTO-RTO: 0 /100
PLATELET # BLD AUTO: 214 10E3/UL (ref 150–450)
POTASSIUM SERPL-SCNC: 3.9 MMOL/L (ref 3.4–5.3)
PROT SERPL-MCNC: 6.9 G/DL (ref 6.4–8.3)
RBC # BLD AUTO: 4.39 10E6/UL (ref 3.8–5.2)
SODIUM SERPL-SCNC: 140 MMOL/L (ref 136–145)
T PALLIDUM AB SER QL: NONREACTIVE
TOTAL PROTEIN SERUM FOR ELP: 6.5 G/DL (ref 6.4–8.3)
VIT B12 SERPL-MCNC: 629 PG/ML (ref 232–1245)
WBC # BLD AUTO: 6.7 10E3/UL (ref 4–11)

## 2023-01-09 PROCEDURE — 95937 NEUROMUSCULAR JUNCTION TEST: CPT | Performed by: PSYCHIATRY & NEUROLOGY

## 2023-01-09 PROCEDURE — 95885 MUSC TST DONE W/NERV TST LIM: CPT | Mod: 59 | Performed by: PSYCHIATRY & NEUROLOGY

## 2023-01-09 PROCEDURE — A9585 GADOBUTROL INJECTION: HCPCS | Performed by: STUDENT IN AN ORGANIZED HEALTH CARE EDUCATION/TRAINING PROGRAM

## 2023-01-09 PROCEDURE — 82728 ASSAY OF FERRITIN: CPT | Mod: 90 | Performed by: PATHOLOGY

## 2023-01-09 PROCEDURE — 99000 SPECIMEN HANDLING OFFICE-LAB: CPT | Performed by: PATHOLOGY

## 2023-01-09 PROCEDURE — 86255 FLUORESCENT ANTIBODY SCREEN: CPT | Mod: 90 | Performed by: PATHOLOGY

## 2023-01-09 PROCEDURE — 85652 RBC SED RATE AUTOMATED: CPT | Performed by: PATHOLOGY

## 2023-01-09 PROCEDURE — 82607 VITAMIN B-12: CPT | Mod: 90 | Performed by: PATHOLOGY

## 2023-01-09 PROCEDURE — 80053 COMPREHEN METABOLIC PANEL: CPT | Performed by: PATHOLOGY

## 2023-01-09 PROCEDURE — 95910 NRV CNDJ TEST 7-8 STUDIES: CPT | Performed by: PSYCHIATRY & NEUROLOGY

## 2023-01-09 PROCEDURE — 84207 ASSAY OF VITAMIN B-6: CPT | Mod: 90 | Performed by: PATHOLOGY

## 2023-01-09 PROCEDURE — 86140 C-REACTIVE PROTEIN: CPT | Performed by: PATHOLOGY

## 2023-01-09 PROCEDURE — 86596 VOLTAGE-GTD CA CHNL ANTB EA: CPT | Mod: 90 | Performed by: PATHOLOGY

## 2023-01-09 PROCEDURE — 83519 RIA NONANTIBODY: CPT | Mod: 90 | Performed by: PATHOLOGY

## 2023-01-09 PROCEDURE — 86039 ANTINUCLEAR ANTIBODIES (ANA): CPT | Mod: 90 | Performed by: PATHOLOGY

## 2023-01-09 PROCEDURE — 82525 ASSAY OF COPPER: CPT | Mod: 90 | Performed by: PATHOLOGY

## 2023-01-09 PROCEDURE — 84630 ASSAY OF ZINC: CPT | Mod: 90 | Performed by: PATHOLOGY

## 2023-01-09 PROCEDURE — 83540 ASSAY OF IRON: CPT | Performed by: PATHOLOGY

## 2023-01-09 PROCEDURE — 86780 TREPONEMA PALLIDUM: CPT | Mod: 90 | Performed by: PATHOLOGY

## 2023-01-09 PROCEDURE — 86038 ANTINUCLEAR ANTIBODIES: CPT | Mod: 90 | Performed by: PATHOLOGY

## 2023-01-09 PROCEDURE — 85025 COMPLETE CBC W/AUTO DIFF WBC: CPT | Performed by: PATHOLOGY

## 2023-01-09 PROCEDURE — 95886 MUSC TEST DONE W/N TEST COMP: CPT | Performed by: PSYCHIATRY & NEUROLOGY

## 2023-01-09 PROCEDURE — 84165 PROTEIN E-PHORESIS SERUM: CPT

## 2023-01-09 PROCEDURE — 86334 IMMUNOFIX E-PHORESIS SERUM: CPT

## 2023-01-09 PROCEDURE — 83550 IRON BINDING TEST: CPT | Performed by: PATHOLOGY

## 2023-01-09 PROCEDURE — 36415 COLL VENOUS BLD VENIPUNCTURE: CPT | Performed by: PATHOLOGY

## 2023-01-09 PROCEDURE — 70553 MRI BRAIN STEM W/O & W/DYE: CPT | Mod: GC | Performed by: STUDENT IN AN ORGANIZED HEALTH CARE EDUCATION/TRAINING PROGRAM

## 2023-01-09 PROCEDURE — 82390 ASSAY OF CERULOPLASMIN: CPT | Mod: 90 | Performed by: PATHOLOGY

## 2023-01-09 RX ORDER — GADOBUTROL 604.72 MG/ML
10 INJECTION INTRAVENOUS ONCE
Status: COMPLETED | OUTPATIENT
Start: 2023-01-09 | End: 2023-01-09

## 2023-01-09 RX ADMIN — GADOBUTROL 8.5 ML: 604.72 INJECTION INTRAVENOUS at 14:21

## 2023-01-09 NOTE — DISCHARGE INSTRUCTIONS
MRI Contrast Discharge Instructions    The IV contrast you received today will pass out of your body in your  urine. This will happen in the next 24 hours. You will not feel this process.  Your urine will not change color.    Drink at least 4 extra glasses of water or juice today (unless your doctor  has restricted your fluids). This reduces the stress on your kidneys.  You may take your regular medicines.    If you are on dialysis: It is best to have dialysis today.    If you have a reaction: Most reactions happen right away. If you have  any new symptoms after leaving the hospital (such as hives or swelling),  call your hospital at the correct number below. Or call your family doctor.  If you have breathing distress or wheezing, call 911.    Special instructions: ***    I have read and understand the above information.    Signature:______________________________________ Date:___________    Staff:__________________________________________ Date:___________     Time:__________    [place] [facility] Departments:    ___[place]: [phone]  ___[facility]: [phone]  ___[place]: [phone] ___[facility]: [phone]  ___[facility]: [phone]  ___[facility]: [phone]  ___[phone]  ___[facility]: [phone]  ___[phone]

## 2023-01-09 NOTE — LETTER
2023       RE: Sharon Waters  [address]  [facility] [address]     Dear Colleague,    Thank you for referring your patient, Sharon Waters, to the [facility] EMG CLINIC [place] at [facility]. Please see a copy of my visit note below.                        [facility]  Electrodiagnostic Laboratory                 Department of Neurology                                                                                                         Test Date:  2023    Patient: Sharon Waters : 1975 Physician: Bandar James MD   Sex: Female AGE: 47 year Ref Phys: Ken Villarreal DO   ID#: [id number]   Technician: Kristy Behling     Clinical Information:  47 year old woman with intermittent fasciculations and cramps in her feet and calves. Evaluate for neuropathy.    Techniques:  Motor and sensory conduction studies were done with surface recording electrodes. EMG was done with a concentric needle electrode.     Results:  Nerve conduction studies:   1. Bilateral sural and superficial peroneal sensory responses are normal.   2. Bilateral peroneal-EDB and tibial-AH motor responses are normal.   3. Right tibial-AH 3 Hz and 5 Hz RNS showed no after discharge potentials.     Needle EMG of selected proximal and distal right and left lower limb muscles was performed as tabulated below. A few fasciculation potentials were seen in the bilateral gastrocnemius muscles. The fasciculation potentials were of simple MUP morphology. No other abnormal spontaneous activity was observed in the sampled muscles. Motor unit potential morphology and recruitment patterns were normal.     Interpretation:  This is probably a normal study. Although fasciculation potentials were seen in the bilateral gastrocnemius muscles they were relatively rare and benign in appearance. There was otherwise no evidence of a widespread disorder of lower motor  neurons, large fiber polyneuropathy, or hyper-excitable peripheral nerve disorder. These findings suggest that the fasciculations are likely benign (i.e., within the spectrum of normal findings) and are unlikely to be associated with a focal or generalized neuropathic condition. Clinical correlation is recommended.     Bandar James MD  Department of Neurology      Nerve Conduction Studies  Motor Sites      Latency Amplitude Neg. Amp Diff Segment Distance Velocity Neg. Dur Neg Area Diff Temperature Comment   Site (ms) Norm (mV) Norm %  cm m/s Norm ms %  C    Left Fibular (EDB) Motor   Ankle 4.5  < 6.0 6.1  > 2.5  Ankle-EDB 8   5.4  31.7    Right Fibular (EDB) Motor   Ankle 3.7  < 6.0 6.6  > 2.5  Ankle-EDB 8   6.2  31.8    Bel Fib Head 11.8 - 5.9 - -10.6 Bel Fib Head-Ankle 38 47  > 38 6.7 -8.4 31.8    Pop Fossa 13.6 - 5.5 - -6.8 Pop Fossa-Bel Fib Head 10 56  > 38 6.3 -5.3 31.7    Left Tibial (AHB) Motor   Ankle 2.9  < 6.5 5.6  > 4.4  Ankle-AHB 8   6.5  31.5    Right Tibial (AHB) Motor   Ankle 4.0  < 6.5 8.6  > 4.4  Ankle-AHB 8   5.1  31.5    Knee 13.8 - 6.7 - -22.1 Knee-Ankle 40 41  > 38 6.4 -10.4 31.5      Sensory Sites      Onset Lat Peak Lat Amp (O-P) Amp (P-P) Segment Distance Velocity Temperature Comment   Site ms ms  V Norm  V  cm m/s Norm  C    Left Superficial Fibular Sensory   14 cm-Ankle 2.2 3.0 10  > 3 11 14 cm-Ankle 12.5 57  > 38 31.6    Right Superficial Fibular Sensory   14 cm-Ankle 2.6 3.3 11  > 3 12 14 cm-Ankle 12.5 48  > 38 31.2    Left Sural Sensory   Calf-Lat Mall 2.5 3.1 17  > 5 23 Calf-Lat Mall 14 56  > 38 31.7    Right Sural Sensory   Calf-Lat Mall 2.6 3.3 20  > 5 18 Calf-Lat Mall 14 54  > 38 32      F Wave Studies     Min-F Max-F Dispersion Persistence Mean-F F-Norm L-R Mean-F L-R Mean-F Norm F/M Ratio F-M Lat (ms)   Right Tibial (Abd Hallucis)  31.4  C   54.06 59.69 5.63 100.00 57.50 <61  <5.7 5.95 49.53     RNS     Tibial-AH RNS After discharge potentials   3 Hz No   5 Hz No        Electromyography     Side Muscle Ins Act Fibs/PSW Fasc HF Amp Dur Poly Recrt Int Pat   Right Vastus Lat Nml None Nml 0 Nml Nml 0 Nml Nml   Right Gastroc Nml None Nml 0 Nml Nml 0 Nml Nml   Right Tib Anterior Nml None Nml 0 Nml Nml 0 Nml Nml   Right Gluteus Max Nml None Nml 0 Nml Nml 0 Nml Nml   Left Tib Anterior Nml None 1+ 0 Nml Nml 0 Nml Nml   Left Gastroc Nml None 1+ 0 Nml Nml 0 Nml Nml   Right Fib Longus Nml None Nml 0 Nml Nml 0 Nml Nml         NCS Waveforms:    Motor                Sensory                         Ultrasound Images:        Again, thank you for allowing me to participate in the care of your patient.      Sincerely,    Bandar James MD

## 2023-01-09 NOTE — PROGRESS NOTES
[facility]  Electrodiagnostic Laboratory                 Department of Neurology                                                                                                         Test Date:  2023    Patient: Sharon Waters : 1975 Physician: Bandar James MD   Sex: Female AGE: 47 year Ref Phys: Ken Villarreal DO   ID#: [id number]   Technician: Kristy Behling     Clinical Information:  47 year old woman with intermittent fasciculations and cramps in her feet and calves. Evaluate for neuropathy.    Techniques:  Motor and sensory conduction studies were done with surface recording electrodes. EMG was done with a concentric needle electrode.     Results:  Nerve conduction studies:   1. Bilateral sural and superficial peroneal sensory responses are normal.   2. Bilateral peroneal-EDB and tibial-AH motor responses are normal.   3. Right tibial-AH 3 Hz and 5 Hz RNS showed no after discharge potentials.     Needle EMG of selected proximal and distal right and left lower limb muscles was performed as tabulated below. A few fasciculation potentials were seen in the bilateral gastrocnemius muscles. The fasciculation potentials were of simple MUP morphology. No other abnormal spontaneous activity was observed in the sampled muscles. Motor unit potential morphology and recruitment patterns were normal.     Interpretation:  This is probably a normal study. Although fasciculation potentials were seen in the bilateral gastrocnemius muscles they were relatively rare and benign in appearance. There was otherwise no evidence of a widespread disorder of lower motor neurons, large fiber polyneuropathy, or hyper-excitable peripheral nerve disorder. These findings suggest that the fasciculations are likely benign (i.e., within the spectrum of normal findings) and are unlikely to be associated with a focal or generalized neuropathic condition. Clinical correlation is recommended.      Bandar James MD  Department of Neurology      Nerve Conduction Studies  Motor Sites      Latency Amplitude Neg. Amp Diff Segment Distance Velocity Neg. Dur Neg Area Diff Temperature Comment   Site (ms) Norm (mV) Norm %  cm m/s Norm ms %  C    Left Fibular (EDB) Motor   Ankle 4.5  < 6.0 6.1  > 2.5  Ankle-EDB 8   5.4  31.7    Right Fibular (EDB) Motor   Ankle 3.7  < 6.0 6.6  > 2.5  Ankle-EDB 8   6.2  31.8    Bel Fib Head 11.8 - 5.9 - -10.6 Bel Fib Head-Ankle 38 47  > 38 6.7 -8.4 31.8    Pop Fossa 13.6 - 5.5 - -6.8 Pop Fossa-Bel Fib Head 10 56  > 38 6.3 -5.3 31.7    Left Tibial (AHB) Motor   Ankle 2.9  < 6.5 5.6  > 4.4  Ankle-AHB 8   6.5  31.5    Right Tibial (AHB) Motor   Ankle 4.0  < 6.5 8.6  > 4.4  Ankle-AHB 8   5.1  31.5    Knee 13.8 - 6.7 - -22.1 Knee-Ankle 40 41  > 38 6.4 -10.4 31.5      Sensory Sites      Onset Lat Peak Lat Amp (O-P) Amp (P-P) Segment Distance Velocity Temperature Comment   Site ms ms  V Norm  V  cm m/s Norm  C    Left Superficial Fibular Sensory   14 cm-Ankle 2.2 3.0 10  > 3 11 14 cm-Ankle 12.5 57  > 38 31.6    Right Superficial Fibular Sensory   14 cm-Ankle 2.6 3.3 11  > 3 12 14 cm-Ankle 12.5 48  > 38 31.2    Left Sural Sensory   Calf-Lat Mall 2.5 3.1 17  > 5 23 Calf-Lat Mall 14 56  > 38 31.7    Right Sural Sensory   Calf-Lat Mall 2.6 3.3 20  > 5 18 Calf-Lat Mall 14 54  > 38 32      F Wave Studies     Min-F Max-F Dispersion Persistence Mean-F F-Norm L-R Mean-F L-R Mean-F Norm F/M Ratio F-M Lat (ms)   Right Tibial (Abd Hallucis)  31.4  C   54.06 59.69 5.63 100.00 57.50 <61  <5.7 5.95 49.53     RNS     Tibial-AH RNS After discharge potentials   3 Hz No   5 Hz No       Electromyography     Side Muscle Ins Act Fibs/PSW Fasc HF Amp Dur Poly Recrt Int Pat   Right Vastus Lat Nml None Nml 0 Nml Nml 0 Nml Nml   Right Gastroc Nml None Nml 0 Nml Nml 0 Nml Nml   Right Tib Anterior Nml None Nml 0 Nml Nml 0 Nml Nml   Right Gluteus Max Nml None Nml 0 Nml Nml 0 Nml Nml   Left Tib Anterior Nml None 1+ 0  Nml Nml 0 Nml Nml   Left Gastroc Nml None 1+ 0 Nml Nml 0 Nml Nml   Right Fib Longus Nml None Nml 0 Nml Nml 0 Nml Nml         NCS Waveforms:    Motor                Sensory                         Ultrasound Images:

## 2023-01-10 ENCOUNTER — VIRTUAL VISIT (OUTPATIENT)
Dept: NEUROLOGY | Facility: CLINIC | Age: 48
End: 2023-01-10
Payer: COMMERCIAL

## 2023-01-10 DIAGNOSIS — G62.9 NEUROPATHY: Primary | ICD-10-CM

## 2023-01-10 LAB
ANA PAT SER IF-IMP: ABNORMAL
ANA SER QL IF: ABNORMAL
ANA TITR SER IF: ABNORMAL {TITER}

## 2023-01-10 PROCEDURE — 99214 OFFICE O/P EST MOD 30 MIN: CPT | Mod: 95 | Performed by: PSYCHIATRY & NEUROLOGY

## 2023-01-10 NOTE — PROGRESS NOTES
Sharon is a 47 year old who is being evaluated via a billable video visit.      How would you like to obtain your AVS? Mychart  If the video visit is dropped, the invitation should be resent by: [phone]        Video-Visit Details    Type of service:  Video Visit     Originating Location (pt. Location): Home    Distant Location (provider location):  On-site  Platform used for Video Visit: Cellartis

## 2023-01-10 NOTE — LETTER
1/10/2023       RE: Sharon Waters  [address]  [place] [address]     Dear Colleague,    Thank you for referring your patient, Sharon Waters, to the [facility] NEUROLOGY CLINIC [place] at [facility]. Please see a copy of my visit note below.    [facility] Neurology Follow Up Visit    Sharon Waters MRN# [id number]   Age: 47 year old YOB: 1975     Brief history of symptoms: The patient was initially seen in neurologic consultation on 10/28/2022 for evaluation of neuropathy. Please see the comprehensive neurologic consultation notes from those dates in the Epic records for details.     Interval history:   - Abnormal serum study of ferritin (201) was noted on 1/9/2023  - Serum studies 1/9/2023 were normal (RPP, NOEMI, ESR, CRP, CMP, CBC, Iron and iron binding, B12)  - Pending results on paraneoplastic james, copper, ceruloplasmin, SPEP, Immunofixation, B6, RPR at time of this visit  - EMG on 1/9/2023 was mostly normal, fasciculations were noted and likely benign. No evidence ofr focal or generalized neuropathic condition.  - MRI brain 1/9/2023 was normal upon review today (no evidence for tumor, MS like lesions, encephalomalacia in a infarction type pattern, or anatomic derangement).    Today, the patient still has waves of symptoms come over her lower extremities without weakness.  There are fasciculations reported, as well as movements related to those fasciculations but not a specific severe cramping reported which differs from a chronic cramping which was present prior.      Physical Exam:   General: Seated comfortably in no acute distress.  Neurologic:     Mental Status: Fully alert, attentive and oriented. Speech clear and fluent, no paraphasic errors.     Cranial Nerves: EOMI with normal smooth pursuit. Facial movements symmetric. Hearing not formally tested but intact to conversation.  No dysarthria.         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  If negative, I suspect her issues to be cramp fasciculation syndrome and treatment could be directed with agents like gabapentin or carbamazepine.      Plan:  - await testing results  - if normal, patient will contact me to discuss any want to try a PRN therapy     Follow up in Neurology clinic in 3 months, or earlier as needed should new concerns arise.        Total time today (31 min) in this patient encounter was spent on pre-charting, counseling and/or coordination of care.       Sharon is a 47 year old who is being evaluated via a billable video visit.      How would you like to obtain your AVS? Mychart  If the video visit is dropped, the invitation should be resent by: [phone]            Again, thank you for allowing me to participate in the care of your patient.      Sincerely,    Ken Villarreal, DO

## 2023-01-10 NOTE — PROGRESS NOTES
[facility] Neurology Follow Up Visit    Sharon Waters MRN# [id number]   Age: 47 year old YOB: 1975     Brief history of symptoms: The patient was initially seen in neurologic consultation on 10/28/2022 for evaluation of neuropathy. Please see the comprehensive neurologic consultation notes from those dates in the Epic records for details.     Interval history:   - Abnormal serum study of ferritin (201) was noted on 1/9/2023  - Serum studies 1/9/2023 were normal (RPP, NOEMI, ESR, CRP, CMP, CBC, Iron and iron binding, B12)  - Pending results on paraneoplastic james, copper, ceruloplasmin, SPEP, Immunofixation, B6, RPR at time of this visit  - EMG on 1/9/2023 was mostly normal, fasciculations were noted and likely benign. No evidence ofr focal or generalized neuropathic condition.  - MRI brain 1/9/2023 was normal upon review today (no evidence for tumor, MS like lesions, encephalomalacia in a infarction type pattern, or anatomic derangement).    Today, the patient still has waves of symptoms come over her lower extremities without weakness.  There are fasciculations reported, as well as movements related to those fasciculations but not a specific severe cramping reported which differs from a chronic cramping which was present prior.      Physical Exam:   General: Seated comfortably in no acute distress.  Neurologic:     Mental Status: Fully alert, attentive and oriented. Speech clear and fluent, no paraphasic errors.     Cranial Nerves: EOMI with normal smooth pursuit. Facial movements symmetric. Hearing not formally tested but intact to conversation.  No dysarthria.         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  If negative, I suspect her issues to be cramp fasciculation syndrome and treatment could be directed with agents like  gabapentin or carbamazepine.      Plan:  - await testing results  - if normal, patient will contact me to discuss any want to try a PRN therapy     Follow up in Neurology clinic in 3 months, or earlier as needed should new concerns arise.    PRIYA Villarreal D.O.   of Neurology    Total time today (31 min) in this patient encounter was spent on pre-charting, counseling and/or coordination of care.

## 2023-01-10 NOTE — PATIENT INSTRUCTIONS
I still suspect a cramp-fasciculation syndrome is present, but would want to wait for all serum studies to be resulted before making that diagnosis.  If you would like to trial a treatment once testing is completed, I would recommend either carbamazepine or gabapentin as first line agents, and likely as an as needed dosing (not daily).  I have attached some information about each medication.  If you do want to consider triying an agent, please let me know before our follow up so I can recommend a certain titration or dosing structure.

## 2023-01-11 LAB
ALBUMIN SERPL ELPH-MCNC: 4.3 G/DL (ref 3.7–5.1)
ALPHA1 GLOB SERPL ELPH-MCNC: 0.2 G/DL (ref 0.2–0.4)
ALPHA2 GLOB SERPL ELPH-MCNC: 0.6 G/DL (ref 0.5–0.9)
B-GLOBULIN SERPL ELPH-MCNC: 0.6 G/DL (ref 0.6–1)
GAMMA GLOB SERPL ELPH-MCNC: 0.8 G/DL (ref 0.7–1.6)
M PROTEIN SERPL ELPH-MCNC: 0 G/DL
PROT PATTERN SERPL ELPH-IMP: NORMAL
PROT PATTERN SERPL IFE-IMP: NORMAL

## 2023-01-12 LAB
CERULOPLASMIN SERPL-MCNC: 21 MG/DL (ref 20–60)
COPPER SERPL-MCNC: 95.5 UG/DL
PYRIDOXAL PHOS SERPL-SCNC: 136.3 NMOL/L
ZINC SERPL-MCNC: 65.8 UG/DL

## 2023-01-14 LAB
AMPHIPHYSIN AB TITR SER: NEGATIVE TITER
CV2 IGG TITR SER: NEGATIVE TITER
GLIAL NUC TYPE 1 AB TITR SER: NEGATIVE TITER
HU1 AB TITR SER: NEGATIVE TITER
HU2 AB TITR SER IF: NEGATIVE TITER
HU3 AB TITR SER: NEGATIVE TITER
IMMUNOLOGIST REVIEW: NORMAL
LABORATORY COMMENT REPORT: NORMAL
PCA-1 AB TITR SER: NEGATIVE TITER
PCA-2 AB TITR SER: NEGATIVE TITER
PCA-TR AB TITR SER IF: NEGATIVE TITER
VGCC-P/Q BIND AB SER-SCNC: 0 NMOL/L
VGKC AB SER-SCNC: 0 NMOL/L

## 2023-02-07 ENCOUNTER — LAB (OUTPATIENT)
Dept: LAB | Facility: CLINIC | Age: 48
End: 2023-02-07
Payer: COMMERCIAL

## 2023-02-07 DIAGNOSIS — G62.9 NEUROPATHY: ICD-10-CM

## 2023-02-07 PROCEDURE — 86341 ISLET CELL ANTIBODY: CPT | Mod: 90

## 2023-02-07 PROCEDURE — 99000 SPECIMEN HANDLING OFFICE-LAB: CPT

## 2023-02-07 PROCEDURE — 36415 COLL VENOUS BLD VENIPUNCTURE: CPT

## 2023-02-12 LAB — GAD65 AB SER IA-ACNC: <5 IU/ML

## 2023-03-15 NOTE — PATIENT INSTRUCTIONS
Please get labs and monitor palpitation  Get zio patch  Follow up with me in 2 weeks  If having chest pain or other symptoms then follow up sooner  Take care  Derrick Senior D.O.        Patient Education     Preventive Health Recommendations  Female Ages 40 to 49    Yearly exam:   See your health care provider every year in order to  Review health changes.   Discuss preventive care.    Review your medicines if your doctor prescribed any.    Get a Pap test every three years (unless you have an abnormal result and your provider advises testing more often).    If you get Pap tests with HPV test, you only need to test every 5 years, unless you have an abnormal result. You do not need a Pap test if your uterus was removed (hysterectomy) and you have not had cancer.    You should be tested each year for STDs (sexually transmitted diseases), if you're at risk.   Ask your doctor if you should have a mammogram.    Have a colonoscopy (test for colon cancer) if someone in your family has had colon cancer or polyps before age 50.     Have a cholesterol test every 5 years.     Have a diabetes test (fasting glucose) after age 45. If you are at risk for diabetes, you should have this test every 3 years.    Shots: Get a flu shot each year. Get a tetanus shot every 10 years.     Nutrition:   Eat at least 5 servings of fruits and vegetables each day.  Eat whole-grain bread, whole-wheat pasta and brown rice instead of white grains and rice.  Get adequate Calcium and Vitamin D.      Lifestyle  Exercise at least 150 minutes a week (an average of 30 minutes a day, 5 days a week). This will help you control your weight and prevent disease.  Limit alcohol to one drink per day.  No smoking.   Wear sunscreen to prevent skin cancer.  See your dentist every six months for an exam and cleaning.

## 2023-03-18 ASSESSMENT — ENCOUNTER SYMPTOMS
BREAST MASS: 0
MYALGIAS: 0
COUGH: 0
HEARTBURN: 0
JOINT SWELLING: 0
ARTHRALGIAS: 0
DYSURIA: 0
DIARRHEA: 0
HEMATOCHEZIA: 0
FREQUENCY: 0
PALPITATIONS: 1
SHORTNESS OF BREATH: 0
SORE THROAT: 0
CONSTIPATION: 0
HEADACHES: 0
DIZZINESS: 0
HEMATURIA: 0
NERVOUS/ANXIOUS: 0
CHILLS: 0
PARESTHESIAS: 0
ABDOMINAL PAIN: 0
FEVER: 0
NAUSEA: 0
WEAKNESS: 0
EYE PAIN: 0

## 2023-03-20 ENCOUNTER — OFFICE VISIT (OUTPATIENT)
Dept: FAMILY MEDICINE | Facility: CLINIC | Age: 48
End: 2023-03-20
Payer: COMMERCIAL

## 2023-03-20 ENCOUNTER — TRANSFERRED RECORDS (OUTPATIENT)
Dept: HEALTH INFORMATION MANAGEMENT | Facility: CLINIC | Age: 48
End: 2023-03-20

## 2023-03-20 VITALS
BODY MASS INDEX: 29.03 KG/M2 | DIASTOLIC BLOOD PRESSURE: 76 MMHG | HEIGHT: 69 IN | RESPIRATION RATE: 16 BRPM | OXYGEN SATURATION: 100 % | TEMPERATURE: 98.1 F | WEIGHT: 196 LBS | HEART RATE: 80 BPM | SYSTOLIC BLOOD PRESSURE: 126 MMHG

## 2023-03-20 DIAGNOSIS — Z13.29 SCREENING FOR THYROID DISORDER: ICD-10-CM

## 2023-03-20 DIAGNOSIS — Z86.0100 HISTORY OF COLONIC POLYPS: ICD-10-CM

## 2023-03-20 DIAGNOSIS — Z13.1 SCREENING FOR DIABETES MELLITUS: ICD-10-CM

## 2023-03-20 DIAGNOSIS — R00.2 PALPITATIONS: ICD-10-CM

## 2023-03-20 DIAGNOSIS — E53.1 VITAMIN B6 DEFICIENCY: ICD-10-CM

## 2023-03-20 DIAGNOSIS — Z00.00 ROUTINE GENERAL MEDICAL EXAMINATION AT A HEALTH CARE FACILITY: Primary | ICD-10-CM

## 2023-03-20 DIAGNOSIS — G43.119 INTRACTABLE MIGRAINE WITH AURA WITHOUT STATUS MIGRAINOSUS: ICD-10-CM

## 2023-03-20 DIAGNOSIS — Z13.220 LIPID SCREENING: ICD-10-CM

## 2023-03-20 DIAGNOSIS — R25.3 MUSCLE FASCICULATION: ICD-10-CM

## 2023-03-20 LAB
BASOPHILS # BLD AUTO: 0 10E3/UL (ref 0–0.2)
BASOPHILS NFR BLD AUTO: 1 %
CHOLEST SERPL-MCNC: 163 MG/DL
EOSINOPHIL # BLD AUTO: 0.3 10E3/UL (ref 0–0.7)
EOSINOPHIL NFR BLD AUTO: 5 %
ERYTHROCYTE [DISTWIDTH] IN BLOOD BY AUTOMATED COUNT: 12.4 % (ref 10–15)
HCT VFR BLD AUTO: 40.5 % (ref 35–47)
HDLC SERPL-MCNC: 55 MG/DL
HGB BLD-MCNC: 13.8 G/DL (ref 11.7–15.7)
LDLC SERPL CALC-MCNC: 97 MG/DL
LYMPHOCYTES # BLD AUTO: 1.4 10E3/UL (ref 0.8–5.3)
LYMPHOCYTES NFR BLD AUTO: 23 %
MCH RBC QN AUTO: 31.1 PG (ref 26.5–33)
MCHC RBC AUTO-ENTMCNC: 34.1 G/DL (ref 31.5–36.5)
MCV RBC AUTO: 91 FL (ref 78–100)
MONOCYTES # BLD AUTO: 0.4 10E3/UL (ref 0–1.3)
MONOCYTES NFR BLD AUTO: 7 %
NEUTROPHILS # BLD AUTO: 4.2 10E3/UL (ref 1.6–8.3)
NEUTROPHILS NFR BLD AUTO: 66 %
NONHDLC SERPL-MCNC: 108 MG/DL
PLATELET # BLD AUTO: 184 10E3/UL (ref 150–450)
RBC # BLD AUTO: 4.44 10E6/UL (ref 3.8–5.2)
TRIGL SERPL-MCNC: 53 MG/DL
TSH SERPL DL<=0.005 MIU/L-ACNC: 2.79 UIU/ML (ref 0.3–4.2)
WBC # BLD AUTO: 6.4 10E3/UL (ref 4–11)

## 2023-03-20 PROCEDURE — 99000 SPECIMEN HANDLING OFFICE-LAB: CPT | Performed by: FAMILY MEDICINE

## 2023-03-20 PROCEDURE — 84443 ASSAY THYROID STIM HORMONE: CPT | Performed by: FAMILY MEDICINE

## 2023-03-20 PROCEDURE — 80061 LIPID PANEL: CPT | Performed by: FAMILY MEDICINE

## 2023-03-20 PROCEDURE — 99396 PREV VISIT EST AGE 40-64: CPT | Performed by: FAMILY MEDICINE

## 2023-03-20 PROCEDURE — 84207 ASSAY OF VITAMIN B-6: CPT | Mod: 90 | Performed by: FAMILY MEDICINE

## 2023-03-20 PROCEDURE — 99213 OFFICE O/P EST LOW 20 MIN: CPT | Mod: 25 | Performed by: FAMILY MEDICINE

## 2023-03-20 PROCEDURE — 36415 COLL VENOUS BLD VENIPUNCTURE: CPT | Performed by: FAMILY MEDICINE

## 2023-03-20 PROCEDURE — 85025 COMPLETE CBC W/AUTO DIFF WBC: CPT | Performed by: FAMILY MEDICINE

## 2023-03-20 PROCEDURE — 93000 ELECTROCARDIOGRAM COMPLETE: CPT | Performed by: FAMILY MEDICINE

## 2023-03-20 ASSESSMENT — ENCOUNTER SYMPTOMS
ARTHRALGIAS: 0
COUGH: 0
WEAKNESS: 0
DIARRHEA: 0
HEMATOCHEZIA: 0
ABDOMINAL PAIN: 0
CONSTIPATION: 0
FREQUENCY: 0
PALPITATIONS: 1
HEADACHES: 0
DIZZINESS: 0
HEARTBURN: 0
JOINT SWELLING: 0
NERVOUS/ANXIOUS: 0
HEMATURIA: 0
BREAST MASS: 0
MYALGIAS: 0
SORE THROAT: 0
SHORTNESS OF BREATH: 0
DYSURIA: 0
PARESTHESIAS: 0
EYE PAIN: 0
NAUSEA: 0
FEVER: 0
CHILLS: 0

## 2023-03-20 ASSESSMENT — PAIN SCALES - GENERAL: PAINLEVEL: NO PAIN (0)

## 2023-03-20 NOTE — PROGRESS NOTES
SUBJECTIVE:   CC: Sharon is an 47 year old who presents for preventive health visit.   Patient has been advised of split billing requirements and indicates understanding: Yes  Healthy Habits:     Getting at least 3 servings of Calcium per day:  NO    Bi-annual eye exam:  Yes    Dental care twice a year:  Yes    Sleep apnea or symptoms of sleep apnea:  None    Diet:  Regular (no restrictions)    Frequency of exercise:  1 day/week    Duration of exercise:  Less than 15 minutes    Taking medications regularly:  Yes    Medication side effects:  Not applicable    PHQ-2 Total Score: 0    Additional concerns today:  Yes    See's OBGYN for pelvic exam     Cramp fasciculation syndrome possibly-all her tests are negative, neurology    Palpitations only at night, no sob, no dizziness, no nausea, no chest pain, no headaches,no fatigue  She does not get it when walking  She has this for 3months, not worsened, same as before  No real anxiety            Today's PHQ-2 Score:   PHQ-2 ( 1999 [employer]) 3/18/2023   Q1: Little interest or pleasure in doing things 0   Q2: Feeling down, depressed or hopeless 0   PHQ-2 Score 0   PHQ-2 Total Score (12-17 Years)- Positive if 3 or more points; Administer PHQ-A if positive -   Q1: Little interest or pleasure in doing things Not at all   Q2: Feeling down, depressed or hopeless Not at all   PHQ-2 Score 0           Social History     Tobacco Use     Smoking status: Never     Smokeless tobacco: Never   Substance Use Topics     Alcohol use: Yes     Comment: 1-2 per week         Alcohol Use 3/18/2023   Prescreen: >3 drinks/day or >7 drinks/week? No       Reviewed orders with patient.  Reviewed health maintenance and updated orders accordingly - Yes  BP Readings from Last 3 Encounters:   03/20/23 126/76   09/08/22 129/75   03/08/22 104/64    Wt Readings from Last 3 Encounters:   03/20/23 88.9 kg (196 lb)   09/08/22 86.2 kg (190 lb)   03/08/22 80.3 kg (177 lb)                    Breast Cancer  Screening:    Breast CA Risk Assessment (FHS-7) 3/5/2022 3/18/2023   Do you have a family history of breast, colon, or ovarian cancer? Yes No / Unknown       click delete button to remove this line now  Mammogram Screening: Recommended annual mammography  Pertinent mammograms are reviewed under the imaging tab.    History of abnormal Pap smear: NO - age 30-65 PAP every 5 years with negative HPV co-testing recommended  PAP / HPV Latest Ref Rng & Units 10/1/2021 2018 2015   PAP   Negative for Intraepithelial Lesion or Malignancy (NILM) - -   PAP (Historical) - - NIL NIL   HPV16 Negative Negative Negative Negative   HPV18 Negative Negative Negative Negative   HRHPV Negative Negative Negative Negative     Reviewed and updated as needed this visit by clinical staff   Tobacco  Allergies  Meds              Reviewed and updated as needed this visit by Provider                 Past Medical History:   Diagnosis Date     Uncomplicated asthma 1992    viral induced      Past Surgical History:   Procedure Laterality Date     COLONOSCOPY N/A 3/5/2021    Procedure: COLONOSCOPY, WITH POLYPECTOMY;  Surgeon: Jesse Asencio MD;  Location: [facility] [place]     NO HISTORY OF SURGERY       OB History    Para Term  AB Living   3 3 3 0 0 3   SAB IAB Ectopic Multiple Live Births   0 0 0 0 3      # Outcome Date GA Lbr Josh/2nd Weight Sex Delivery Anes PTL Lv   3 Term         YISSEL   2 Term         YISSEL   1 Term         YISSEL       Review of Systems   Constitutional: Negative for chills and fever.   HENT: Negative for congestion, ear pain, hearing loss and sore throat.    Eyes: Negative for pain and visual disturbance.   Respiratory: Negative for cough and shortness of breath.    Cardiovascular: Positive for palpitations. Negative for chest pain and peripheral edema.   Gastrointestinal: Negative for abdominal pain, constipation, diarrhea, heartburn, hematochezia and nausea.   Breasts:  Negative for tenderness,  "breast mass and discharge.   Genitourinary: Negative for dysuria, frequency, genital sores, hematuria, pelvic pain, urgency, vaginal bleeding and vaginal discharge.   Musculoskeletal: Negative for arthralgias, joint swelling and myalgias.   Skin: Negative for rash.   Neurological: Negative for dizziness, weakness, headaches and paresthesias.   Psychiatric/Behavioral: Negative for mood changes. The patient is not nervous/anxious.      CONSTITUTIONAL: NEGATIVE for fever, chills, change in weight  INTEGUMENTARU/SKIN: NEGATIVE for worrisome rashes, moles or lesions  EYES: NEGATIVE for vision changes or irritation  ENT: NEGATIVE for ear, mouth and throat problems  RESP: NEGATIVE for significant cough or SOB  BREAST: NEGATIVE for masses, tenderness or discharge  CV: NEGATIVE for chest pain, palpitations or peripheral edema  GI: NEGATIVE for nausea, abdominal pain, heartburn, or change in bowel habits  : NEGATIVE for unusual urinary or vaginal symptoms. Periods are regular.  MUSCULOSKELETAL: NEGATIVE for significant arthralgias or myalgia  NEURO: NEGATIVE for weakness, dizziness or paresthesias  PSYCHIATRIC: NEGATIVE for changes in mood or affect     OBJECTIVE:   /76   Pulse 80   Temp 98.1  F (36.7  C) (Oral)   Resp 16   Ht 1.753 m (5' 9\")   Wt 88.9 kg (196 lb)   SpO2 100%   BMI 28.94 kg/m    Physical Exam  GENERAL: healthy, alert and no distress  EYES: Eyes grossly normal to inspection, PERRL and conjunctivae and sclerae normal  HENT: ear canals and TM's normal, nose and mouth without ulcers or lesions  NECK: no adenopathy, no asymmetry, masses, or scars and thyroid normal to palpation  RESP: lungs clear to auscultation - no rales, rhonchi or wheezes  BREAST: normal without masses, tenderness or nipple discharge and no palpable axillary masses or adenopathy  CV: regular rate and rhythm, normal S1 S2, no S3 or S4, no murmur, click or rub, no peripheral edema and peripheral pulses strong  ABDOMEN: soft, " nontender, no hepatosplenomegaly, no masses and bowel sounds normal  MS: no gross musculoskeletal defects noted, no edema  SKIN: no suspicious lesions or rashes  NEURO: Normal strength and tone, mentation intact and speech normal  PSYCH: mentation appears normal, affect normal/bright    Diagnostic Test Results:  Labs reviewed in Epic    ASSESSMENT/PLAN:       ICD-10-CM    1. Routine general medical examination at a health care facility  Z00.00       2. Lipid screening  Z13.220 Lipid panel reflex to direct LDL Fasting     CBC with platelets and differential     Lipid panel reflex to direct LDL Fasting     CBC with platelets and differential      3. Screening for diabetes mellitus  Z13.1       4. Screening for thyroid disorder  Z13.29 TSH with free T4 reflex     TSH with free T4 reflex     OFFICE/OUTPT VISIT,EST,LEVL IV      5. Vitamin B6 deficiency  E53.1 Vitamin B6     Vitamin B6     OFFICE/OUTPT VISIT,EST,LEVL IV      6. Palpitations  R00.2 EKG 12-lead complete w/read - Clinics     Adult Leadless EKG Monitor 3 to 7 Days     OFFICE/OUTPT VISIT,EST,LEVL IV     CANCELED: Adult Leadless EKG Monitor 3 to 7 Days      7. History of colonic polyps  Z86.010 OFFICE/OUTPT VISIT,EST,LEVL IV      8. Intractable migraine with aura without status migrainosus  G43.119 OFFICE/OUTPT VISIT,EST,LEVL IV      9. Muscle fasciculation  R25.3         Palpitation-patient reports for the last 3 months she has been having some palpitation without shortness of breath or chest pain or dizziness that comes and goes.  Does not limit her daily activity and resolves on its own without bothering her.  It has not increased in severity or intensity or frequency.  EKG today was normal awaiting additional lab results.  Advised to Zio patch follow-up in 2 weeks to review.  Advised being seen sooner if having additional symptoms or worsening current symptoms.    Recent history of muscle fasciculation-patient has been worked up with neurology and so far  MRI and labs are essentially stable patient is to follow-up with neurology as planned    Vitamin B6 history -elevated on recent check-she has stopped her multivitamin since then we will repeat today    History of colon [id number] for colonoscopy 5 years from previous.  No GI symptoms      Patient has been advised of split billing requirements and indicates understanding: Yes      COUNSELING:  Reviewed preventive health counseling, as reflected in patient instructions       Regular exercise       Healthy diet/nutrition       Vision screening       Hearing screening       Family planning       Osteoporosis prevention/bone health       Safe sex practices/STD prevention       Colorectal Cancer Screening        She reports that she has never smoked. She has never used smokeless tobacco.        Derrick Senior DO  [facility]

## 2023-03-20 NOTE — NURSING NOTE
Zio Patch and/or Holter monitor placed on 3/20/23. Patient instructed in use, questions answered. Instructed the patient to remove and mail the device via the pre-addressed and pre-postage box/envelope after 7 hours/days (per provider instruction).    Soo Granados MA

## 2023-03-23 LAB — PYRIDOXAL PHOS SERPL-SCNC: 86.9 NMOL/L

## 2023-04-13 ENCOUNTER — VIRTUAL VISIT (OUTPATIENT)
Dept: NEUROLOGY | Facility: CLINIC | Age: 48
End: 2023-04-13
Payer: COMMERCIAL

## 2023-04-13 DIAGNOSIS — R25.3 BENIGN FASCICULATION-CRAMP SYNDROME: Primary | ICD-10-CM

## 2023-04-13 PROCEDURE — 99214 OFFICE O/P EST MOD 30 MIN: CPT | Mod: VID | Performed by: PSYCHIATRY & NEUROLOGY

## 2023-04-13 NOTE — PATIENT INSTRUCTIONS
"I do think a diagnosis of benign cramp fasciculation syndrome meets your symptoms and testing so far.  However, as time goes on and whether new issues are noted (specifically heart conduction issues), I may ask you to obtain further testing in terms of looking for a group of disorders known as \"channelopathies\".    - For now, consider using carbamazepine for cramp control. I have added some information about the medication to this after visit summary for your reading.  You can contact me in case you would like to try it.      "

## 2023-04-13 NOTE — PROGRESS NOTES
[facility] Neurology Follow Up Visit    Sharon Waters MRN# [id number]   Age: 47 year old YOB: 1975     Brief history of symptoms: The patient was initially seen in neurologic consultation on 10/28/2022 and most recently 1/10/2023  for evaluation of neuroapthy. Please see the comprehensive neurologic consultation notes from those dates in the Epic records for details.     At our last visit, the patient had a relatively unremarkable w/up that included EMG, MRI brain, and serum studies.  It was suspected she had cramp-fasciculation syndrome and was to await un-resulted tests to be resulted before considering as needed medications.    Interval history:   - MOIZ was normal on 2/7/2023  - B6 was reduced (86.9) from 136.3 at repeat testing 3/20/2023  - Copper, ceruloplasmin, paraneoplastic was normal 1/9/2023    Today, the patient started to develop heart palpitations and is working with her PCP in regards to this.  She had a zio-patch show SVT.  Her twitching in the legs is 24/7 but not necessarily painful if she can stave off cramps.  There are no new symptoms (pain, weakness, sensory loss) to speak of.     Physical Exam:   General: Seated comfortably in no acute distress.  Neurologic:     Mental Status: Fully alert, attentive and oriented. Speech clear and fluent, no paraphasic errors.     Cranial Nerves: EOMI with normal smooth pursuit. Facial movements symmetric. Hearing not formally tested but intact to conversation.  No dysarthria.     Motor: No tremors or other abnormal movements observed.          Assessment and Plan:   Assessment:  Cramp-Fasciculation syndrome    The patient would like to hold off on treatment with carbamazepine for her cramps at this time.  She was advised to continue her w/up with cardiology regarding SVT.  We discussed that there are some disorders involving calcium, potassium, chloride, and sodium channels that can lead to cramps and cardiac conduction issues.  I would defer  advanced testing for these disorders until a later date pending her cardiac w/up, new development of symptoms, or worsening of her neurological issues.    Plan:  Hold off on medications for now    Follow up in Neurology clinic in 6 months, or earlier as needed should new concerns arise.    PRIYA Villarreal D.O.   of Neurology    Total time today (31 min) in this patient encounter was spent on pre-charting, counseling and/or coordination of care.

## 2023-04-13 NOTE — LETTER
4/13/2023       RE: Sharon Waters  [address]  [place] [address]     Dear Colleague,    Thank you for referring your patient, Sharon Waters, to the [facility]UROLOG[facility] [place] [facility]. Please see a copy of my visit note below.    [facility] Neurology Follow Up Visit    Sharon Waters MRN# [id number]   Age: 47 year old YOB: 1975     Brief history of symptoms: The patient was initially seen in neurologic consultation on 10/28/2022 and most recently 1/10/2023  for evaluation of neuroapthy. Please see the comprehensive neurologic consultation notes from those dates in the Epic records for details.     At our last visit, the patient had a relatively unremarkable w/up that included EMG, MRI brain, and serum studies.  It was suspected she had cramp-fasciculation syndrome and was to await un-resulted tests to be resulted before considering as needed medications.    Interval history:   - MOIZ was normal on 2/7/2023  - B6 was reduced (86.9) from 136.3 at repeat testing 3/20/2023  - Copper, ceruloplasmin, paraneoplastic was normal 1/9/2023    Today, the patient started to develop heart palpitations and is working with her PCP in regards to this.  She had a zio-patch show SVT.  Her twitching in the legs is 24/7 but not necessarily painful if she can stave off cramps.  There are no new symptoms (pain, weakness, sensory loss) to speak of.     Physical Exam:   General: Seated comfortably in no acute distress.  Neurologic:     Mental Status: Fully alert, attentive and oriented. Speech clear and fluent, no paraphasic errors.     Cranial Nerves: EOMI with normal smooth pursuit. Facial movements symmetric. Hearing not formally tested but intact to conversation.  No dysarthria.     Motor: No tremors or other abnormal movements observed.          Assessment and Plan:   Assessment:  Cramp-Fasciculation syndrome    The patient would  like to hold off on treatment with carbamazepine for her cramps at this time.  She was advised to continue her w/up with cardiology regarding SVT.  We discussed that there are some disorders involving calcium, potassium, chloride, and sodium channels that can lead to cramps and cardiac conduction issues.  I would defer advanced testing for these disorders until a later date pending her cardiac w/up, new development of symptoms, or worsening of her neurological issues.    Plan:  Hold off on medications for now    Follow up in Neurology clinic in 6 months, or earlier as needed should new concerns arise.    PRIYA Villarreal D.O.   of Neurology    Total time today (31 min) in this patient encounter was spent on pre-charting, counseling and/or coordination of care.       Sharon is a 47 year old who is being evaluated via a billable video visit.      How would you like to obtain your AVS? Mychart  If the video visit is dropped, the invitation should be resent by: [phone]        Again, thank you for allowing me to participate in the care of your patient.      Sincerely,    Ken Villarreal, DO

## 2023-04-13 NOTE — PROGRESS NOTES
Sharon is a 47 year old who is being evaluated via a billable video visit.      How would you like to obtain your AVS? Mychart  If the video visit is dropped, the invitation should be resent by: [phone]        Video-Visit Details    Type of service:  Video Visit     Originating Location (pt. Location): Home    Distant Location (provider location):  On-site  Platform used for Video Visit:[employer]

## 2023-04-24 ENCOUNTER — LAB (OUTPATIENT)
Dept: LAB | Facility: CLINIC | Age: 48
End: 2023-04-24
Payer: COMMERCIAL

## 2023-04-24 ENCOUNTER — ANCILLARY PROCEDURE (OUTPATIENT)
Dept: CARDIOLOGY | Facility: CLINIC | Age: 48
End: 2023-04-24
Attending: FAMILY MEDICINE
Payer: COMMERCIAL

## 2023-04-24 DIAGNOSIS — R25.3 MUSCLE FASCICULATION: ICD-10-CM

## 2023-04-24 DIAGNOSIS — Z13.1 SCREENING FOR DIABETES MELLITUS: ICD-10-CM

## 2023-04-24 DIAGNOSIS — R00.2 PALPITATIONS: ICD-10-CM

## 2023-04-24 LAB — LVEF ECHO: NORMAL

## 2023-04-24 PROCEDURE — 36415 COLL VENOUS BLD VENIPUNCTURE: CPT

## 2023-04-24 PROCEDURE — 80048 BASIC METABOLIC PNL TOTAL CA: CPT

## 2023-04-24 PROCEDURE — 93306 TTE W/DOPPLER COMPLETE: CPT | Performed by: INTERNAL MEDICINE

## 2023-04-24 NOTE — LETTER
2023      Sharon Black  [address]  [place] [address]        Dear ,    We are writing to inform you of your test results.    Your echocardiogram was normal.     Resulted Orders   Echocardiogram Complete   Result Value Ref Range    LVEF  60-65%     [facility]    [id number]  [id number]  [id number]  [id number]^RAISA^TEO     [facility], Echocardiography Laboratory  [address] [place] [id number]     Name: SHARON BLACK  MRN: [id number]  : 1975  Study Date: 2023 03:02 PM  Age: 47 yrs  Gender: Female  Patient Location: [facility]  Reason For Study: Palpitations, Muscle fasciculation  Ordering Physician: RAISA EPPERSON  Referring Physician: RAISA EPPERSON  Performed By: Ewa Ojeda RDCS     BSA: 2.0 m2  Height: 69 in  Weight: 196 lb  BP: 121/73 mmHg  ______________________________________________________________________________  Procedure  Echocardiogram with two-dimensional, color and spectral Doppler performed.  ______________________________________________________________________________  Interpretation Summary     Global and regional left ventricular function is normal with an EF of 60-65%.  Right ventricular function, chamber size, wall motion, and thickness are  normal.  The inferior vena cava is normal.  No pericardial effusion is present.  There is no prior study for direct comparison.  ______________________________________________________________________________  Left Ventricle  Global and regional left ventricular function is normal with an EF of 60-65%.  Left ventricular wall thickness is normal. Left ventricular size is normal.  Left ventricular diastolic function is normal. No regional wall motion  abnormalities are seen.     Right Ventricle  Right ventricular function, chamber size, wall motion, and thickness are  normal.     Atria  Both atria appear normal. The atrial septum is intact as assessed  by color  Doppler .     Mitral Valve  The mitral valve is normal.     Aortic Valve  The valve leaflets are not well visualized. On Doppler interrogation, there is  no significant stenosis or regurgitation.     Tricuspid Valve  The tricuspid valve is normal. Trace tricuspid insufficiency is present.  Pulmonary artery systolic pressure cannot be assessed.     Pulmonic Valve  The pulmonic valve is normal. Trace pulmonic insufficiency is present.     Vessels  The thoracic aorta is normal. The pulmonary artery and bifurcation cannot be  assessed. The inferior vena cava is normal.     Pericardium  No pericardial effusion is present.     Compared to Previous Study  There is no prior study for direct comparison.  ______________________________________________________________________________  MMode/2D Measurements & Calculations     IVSd: 1.1 cm  LVIDd: 5.1 cm  LVIDs: 3.4 cm  LVPWd: 0.78 cm  FS: 32.9 %  LV mass(C)d: 170.0 grams  LV mass(C)dI: 83.0 grams/m2  Ao root diam: 3.4 cm  asc Aorta Diam: 3.3 cm  LVOT diam: 2.0 cm  LVOT area: 3.0 cm2  LA Volume (BP): 39.4 ml  LA Volume Index (BP): 19.2 ml/m2  RV Base: 3.0 cm     RWT: 0.31  TAPSE: 1.7 cm     Doppler Measurements & Calculations  MV E max cherie: 74.2 cm/sec  MV A max cherie: 53.8 cm/sec  MV E/A: 1.4  MV dec time: 0.20 sec  PA acc time: 0.12 sec  E/E' av.2  Lateral E/e': 5.3  Medial E/e': 11.1     ______________________________________________________________________________  Report approved by: Bj Dailey 2023 03:32 PM             If you have any questions or concerns, please call the clinic at the number listed above.       Sincerely,      Derrick Senior DO

## 2023-04-25 LAB
ANION GAP SERPL CALCULATED.3IONS-SCNC: 4 MMOL/L (ref 3–14)
BUN SERPL-MCNC: 16 MG/DL (ref 7–30)
CALCIUM SERPL-MCNC: 8.8 MG/DL (ref 8.5–10.1)
CHLORIDE BLD-SCNC: 106 MMOL/L (ref 94–109)
CO2 SERPL-SCNC: 30 MMOL/L (ref 20–32)
CREAT SERPL-MCNC: 1.01 MG/DL (ref 0.52–1.04)
GFR SERPL CREATININE-BSD FRML MDRD: 68 ML/MIN/1.73M2
GLUCOSE BLD-MCNC: 90 MG/DL (ref 70–99)
POTASSIUM BLD-SCNC: 4.1 MMOL/L (ref 3.4–5.3)
SODIUM SERPL-SCNC: 140 MMOL/L (ref 133–144)

## 2023-07-12 NOTE — TELEPHONE ENCOUNTER
DIAGNOSIS: ASHLEIGH Hip pain and/or discomfort   APPOINTMENT DATE: 07/15/23   NOTES STATUS DETAILS   OFFICE NOTE from referring provider Internal 06/27/23:  - [facility] - Derrick Senior DO   MEDICATION LIST Internal    MRI Internal 11/22/21:  - MR Lumbar Spine - [facility] - Cheri Corcoran MD     Action 07/12/23  MB   Action Taken  Spoke to patient- she had a tail bone injury in 2001 and has had issues since then. Has not been seen anywhere for hip pain.

## 2023-07-13 DIAGNOSIS — M25.559 HIP PAIN: Primary | ICD-10-CM

## 2023-07-14 NOTE — PROGRESS NOTES
Bilateral hip pain, recurrent since college age, worse after walking for exercise.    Today, the patient reports bilateral lateral hip pain for 6-9 months, but has noticed the hips since college with prolonged weightbearing activities. Reports a history of tailbone injury in 2001, otherwise no other hip injuries. Pain is equal in bilateral hips. Pain is worse with standing, sitting, sleeping.  Lateral sided hip discomfort with positioning or self in bed at night.  She does take Advil for the pain. She has not been to physical therapy or received any injections into the bilateral hips.       She is  with 3 children.  She is a  at the [employer].  Patient indicates that she is seated through much of the day.  She does have some sit/stand options.      Imaging and laboratory studies below reviewed by me:  MRI lumbar spine  11/22/2021:  T12-L1: No spinal canal or neuroforaminal stenosis.     L1-2: No spinal canal or neuroforaminal stenosis.     L2-3: No spinal canal or neuroforaminal stenosis.     L3-4: No spinal canal or neuroforaminal stenosis.     L4-5: No spinal canal or neuroforaminal stenosis.     L5-S1: Bilateral facet hypertrophy. No spinal canal or neural  foraminal stenosis.     Paraspinous tissues are within normal limits.                                                                      Impression:   1. Mild lower lumbar degenerative changes without spinal canal or  neural foraminal stenosis.  2. Incidental bilateral sacral Tarlov/perineural cysts, left greater  than right, of doubtful clinical significance and unchanged since  11/7/2021.        The patient was seen in Neurosurgery clinic 1/24/2022 for ongoing lower abdominal pain and episodic lower back pain.  She was noted to have a 2.7 cm perineural cyst abutting the L-S1-2 nerve root on 10/7/2021.  The Tarlov cyst was not thought to be causing her lower back or abdominal pain.      Pt has also consulted with Neurology for bilateral lower  leg pain, with lab and imaging workup.  Pt was felt to have benign LE cramping syndrome.  Most recent neurology clinic note 4/13/2023 reviewed by me.    Normal CT scan ABD pelvis with contrast  11/7/2021    Serum studies 1/9/2023 were normal (RPR, NOEMI, ESR, CRP, CMP, CBC, Iron and iron binding, B12)    TSH 3/20/2023 normal.    EMG  bilat LE 1/9/2023  No evidence of focal or generalized neuropathic condition.        MR BRAIN W/O & W CONTRAST 1/9/2023 3:09 PM     Provided History: migraine, imbalance; Migraine with aura and without  status migrainosus, not intractable; Imbalance  ICD-10: Migraine with aura and without status migrainosus, not  intractable; Imbalance     Comparison:  None.      Technique: Sagittal T1-weighted, coronal T2-weighted, axial T2 FLAIR,  axial susceptibility weighted, and axial diffusion-weighted with ADC  map images of the brain were obtained without intravenous contrast.     Findings: No intracranial mass lesion, mass effect, midline shift, or  abnormal fluid collection. The ventricles and sulci are normal for  age. No susceptibility or diffusion restriction related abnormalities.  No abnormal enhancement on postcontrast images. Normal intravascular  flow voids. Normal orbits. Clear paranasal sinuses/mastoid air cells.  Normal soft tissues. Normal calvarium.                                                                      Impression: Normal brain MRI.     I have personally reviewed the examination and initial interpretation  and I agree with the findings.     MANUELA NEAL MD        PMH:  Past Medical History:   Diagnosis Date     Uncomplicated asthma 1992    viral induced       Active problem list:  Patient Active Problem List   Diagnosis     Encounter for removal and reinsertion of intrauterine contraceptive device     Bilateral carpal tunnel syndrome     Encounter for IUD insertion       FH:  Family History   Problem Relation Age of Onset     Hypertension Mother      Colon Cancer  Maternal Grandmother      Diabetes Paternal Grandmother      Breast Cancer Paternal Grandmother        SH:  Social History     Socioeconomic History     Marital status:      Spouse name: Not on file     Number of children: Not on file     Years of education: Not on file     Highest education level: Not on file   Occupational History     Not on file   Tobacco Use     Smoking status: Never     Smokeless tobacco: Never   Vaping Use     Vaping Use: Never used   Substance and Sexual Activity     Alcohol use: Yes     Comment: 1-2 per week     Drug use: No     Sexual activity: Yes     Partners: Male     Birth control/protection: I.U.D.   Other Topics Concern     Parent/sibling w/ CABG, MI or angioplasty before 65F 55M? No   Social History Narrative     Not on file     Social Determinants of Health     Financial Resource Strain: Not on file   Food Insecurity: Not on file   Transportation Needs: Not on file   Physical Activity: Not on file   Stress: Not on file   Social Connections: Not on file   Intimate Partner Violence: Not on file   Housing Stability: Not on file       MEDS:  See EMR, reviewed  ALL:  See EMR, reviewed    REVIEW OF SYSTEMS:  CONSTITUTIONAL:NEGATIVE for fever, chills, change in weight  INTEGUMENTARY/SKIN: NEGATIVE for worrisome rashes, moles or lesions  EYES: NEGATIVE for vision changes or irritation  ENT/MOUTH: NEGATIVE for ear, mouth and throat problems  RESP:NEGATIVE for significant cough or SOB  BREAST: NEGATIVE for masses, tenderness or discharge  CV: NEGATIVE for chest pain, palpitations or peripheral edema  GI: NEGATIVE for nausea, abdominal pain, heartburn, or change in bowel habits  :NEGATIVE for frequency, dysuria, or hematuria  :NEGATIVE for frequency, dysuria, or hematuria  NEURO: NEGATIVE for weakness, dizziness or paresthesias  ENDOCRINE: NEGATIVE for temperature intolerance, skin/hair changes  HEME/ALLERGY/IMMUNE: NEGATIVE for bleeding problems  PSYCHIATRIC: NEGATIVE for changes  in mood or affect        Objective: Full range of motion of the bilateral hips to internal rotation external Tatian abduction.  Nontender over the anterior superior iliac spine bilaterally.  Tender over the greater trochanters bilaterally.  Nontender over the distal IT band at the bilateral knees.  Nontender over the SI joint bilaterally.  Neutral heel with neutral forefoot.  Improvements that can be made in 1 and 2 legged squat form.  Appropriate conversation and affect.    I personally viewed with the patient x-rays of the bilateral hips that show mild degenerative hip changes bilaterally, with overall good maintenance of the joint space.    Assessment: Gluteus medius tendinitis/IT band tendinitis bilateral hips.    Plan: We discussed improvements that could be made in ergonomics of the seated position.  We discussed localized massage that can be done with a tennis ball against the wall over the area of the greater trochanter.  She will see physical therapy for a pelvifemoral alignment program.  We discussed adding maintenance exercises like yoga or Pilates to her exercise protocol.  She enjoys walking 1 to 2 miles for exercise each lunch at noon.  I encouraged her to stick to flat surfaces for her walking program rather than steep hills.  Follow-up with physical therapy.

## 2023-07-15 ENCOUNTER — PRE VISIT (OUTPATIENT)
Dept: ORTHOPEDICS | Facility: CLINIC | Age: 48
End: 2023-07-15
Payer: COMMERCIAL

## 2023-07-15 ENCOUNTER — OFFICE VISIT (OUTPATIENT)
Dept: ORTHOPEDICS | Facility: CLINIC | Age: 48
End: 2023-07-15
Payer: COMMERCIAL

## 2023-07-15 ENCOUNTER — ANCILLARY PROCEDURE (OUTPATIENT)
Dept: GENERAL RADIOLOGY | Facility: CLINIC | Age: 48
End: 2023-07-15
Attending: FAMILY MEDICINE
Payer: COMMERCIAL

## 2023-07-15 DIAGNOSIS — M67.952 TENDINOPATHY OF LEFT GLUTEUS MEDIUS: Primary | ICD-10-CM

## 2023-07-15 DIAGNOSIS — M67.951 TENDINOPATHY OF RIGHT GLUTEUS MEDIUS: ICD-10-CM

## 2023-07-15 DIAGNOSIS — M25.559 HIP PAIN: ICD-10-CM

## 2023-07-15 DIAGNOSIS — M76.30 ILIOTIBIAL BAND SYNDROME, UNSPECIFIED LATERALITY: ICD-10-CM

## 2023-07-15 PROCEDURE — 73522 X-RAY EXAM HIPS BI 3-4 VIEWS: CPT | Mod: GC | Performed by: RADIOLOGY

## 2023-07-15 PROCEDURE — 99203 OFFICE O/P NEW LOW 30 MIN: CPT | Performed by: FAMILY MEDICINE

## 2023-07-15 NOTE — LETTER
7/15/2023      RE: Sharon Waters  [address]  [place] [address]     Dear Colleague,    Thank you for referring your patient, Sharon Waters, to the [facility] [place]. Please see a copy of my visit note below.    Bilateral hip pain, recurrent since college age, worse after walking for exercise.    Today, the patient reports bilateral lateral hip pain for 6-9 months, but has noticed the hips since college with prolonged weightbearing activities. Reports a history of tailbone injury in 2001, otherwise no other hip injuries. Pain is equal in bilateral hips. Pain is worse with standing, sitting, sleeping.  Lateral sided hip discomfort with positioning or self in bed at night.  She does take Advil for the pain. She has not been to physical therapy or received any injections into the bilateral hips.       She is  with 3 children.  She is a  at the [employer].  Patient indicates that she is seated through much of the day.  She does have some sit/stand options.      Imaging and laboratory studies below reviewed by me:  MRI lumbar spine  11/22/2021:  T12-L1: No spinal canal or neuroforaminal stenosis.     L1-2: No spinal canal or neuroforaminal stenosis.     L2-3: No spinal canal or neuroforaminal stenosis.     L3-4: No spinal canal or neuroforaminal stenosis.     L4-5: No spinal canal or neuroforaminal stenosis.     L5-S1: Bilateral facet hypertrophy. No spinal canal or neural  foraminal stenosis.     Paraspinous tissues are within normal limits.                                                                      Impression:   1. Mild lower lumbar degenerative changes without spinal canal or  neural foraminal stenosis.  2. Incidental bilateral sacral Tarlov/perineural cysts, left greater  than right, of doubtful clinical significance and unchanged since  11/7/2021.        The patient was seen in Neurosurgery clinic 1/24/2022 for ongoing lower  abdominal pain and episodic lower back pain.  She was noted to have a 2.7 cm perineural cyst abutting the L-S1-2 nerve root on 10/7/2021.  The Tarlov cyst was not thought to be causing her lower back or abdominal pain.      Pt has also consulted with Neurology for bilateral lower leg pain, with lab and imaging workup.  Pt was felt to have benign LE cramping syndrome.  Most recent neurology clinic note 4/13/2023 reviewed by me.    Normal CT scan ABD pelvis with contrast  11/7/2021    Serum studies 1/9/2023 were normal (RPR, NOEMI, ESR, CRP, CMP, CBC, Iron and iron binding, B12)    TSH 3/20/2023 normal.    EMG  bilat LE 1/9/2023  No evidence of focal or generalized neuropathic condition.        MR BRAIN W/O & W CONTRAST 1/9/2023 3:09 PM     Provided History: migraine, imbalance; Migraine with aura and without  status migrainosus, not intractable; Imbalance  ICD-10: Migraine with aura and without status migrainosus, not  intractable; Imbalance     Comparison:  None.      Technique: Sagittal T1-weighted, coronal T2-weighted, axial T2 FLAIR,  axial susceptibility weighted, and axial diffusion-weighted with ADC  map images of the brain were obtained without intravenous contrast.     Findings: No intracranial mass lesion, mass effect, midline shift, or  abnormal fluid collection. The ventricles and sulci are normal for  age. No susceptibility or diffusion restriction related abnormalities.  No abnormal enhancement on postcontrast images. Normal intravascular  flow voids. Normal orbits. Clear paranasal sinuses/mastoid air cells.  Normal soft tissues. Normal calvarium.                                                                      Impression: Normal brain MRI.     I have personally reviewed the examination and initial interpretation  and I agree with the findings.     MANUELA NEAL MD        PMH:  Past Medical History:   Diagnosis Date    Uncomplicated asthma 1992    viral induced       Active problem list:  Patient  Active Problem List   Diagnosis    Encounter for removal and reinsertion of intrauterine contraceptive device    Bilateral carpal tunnel syndrome    Encounter for IUD insertion       FH:  Family History   Problem Relation Age of Onset    Hypertension Mother     Colon Cancer Maternal Grandmother     Diabetes Paternal Grandmother     Breast Cancer Paternal Grandmother        SH:  Social History     Socioeconomic History    Marital status:      Spouse name: Not on file    Number of children: Not on file    Years of education: Not on file    Highest education level: Not on file   Occupational History    Not on file   Tobacco Use    Smoking status: Never    Smokeless tobacco: Never   Vaping Use    Vaping Use: Never used   Substance and Sexual Activity    Alcohol use: Yes     Comment: 1-2 per week    Drug use: No    Sexual activity: Yes     Partners: Male     Birth control/protection: I.U.D.   Other Topics Concern    Parent/sibling w/ CABG, MI or angioplasty before 65F 55M? No   Social History Narrative    Not on file     Social Determinants of Health     Financial Resource Strain: Not on file   Food Insecurity: Not on file   Transportation Needs: Not on file   Physical Activity: Not on file   Stress: Not on file   Social Connections: Not on file   Intimate Partner Violence: Not on file   Housing Stability: Not on file       MEDS:  See EMR, reviewed  ALL:  See EMR, reviewed    REVIEW OF SYSTEMS:  CONSTITUTIONAL:NEGATIVE for fever, chills, change in weight  INTEGUMENTARY/SKIN: NEGATIVE for worrisome rashes, moles or lesions  EYES: NEGATIVE for vision changes or irritation  ENT/MOUTH: NEGATIVE for ear, mouth and throat problems  RESP:NEGATIVE for significant cough or SOB  BREAST: NEGATIVE for masses, tenderness or discharge  CV: NEGATIVE for chest pain, palpitations or peripheral edema  GI: NEGATIVE for nausea, abdominal pain, heartburn, or change in bowel habits  :NEGATIVE for frequency, dysuria, or  hematuria  :NEGATIVE for frequency, dysuria, or hematuria  NEURO: NEGATIVE for weakness, dizziness or paresthesias  ENDOCRINE: NEGATIVE for temperature intolerance, skin/hair changes  HEME/ALLERGY/IMMUNE: NEGATIVE for bleeding problems  PSYCHIATRIC: NEGATIVE for changes in mood or affect        Objective: Full range of motion of the bilateral hips to internal rotation external Tatian abduction.  Nontender over the anterior superior iliac spine bilaterally.  Tender over the greater trochanters bilaterally.  Nontender over the distal IT band at the bilateral knees.  Nontender over the SI joint bilaterally.  Neutral heel with neutral forefoot.  Improvements that can be made in 1 and 2 legged squat form.  Appropriate conversation and affect.    I personally viewed with the patient x-rays of the bilateral hips that show mild degenerative hip changes bilaterally, with overall good maintenance of the joint space.    Assessment: Gluteus medius tendinitis/IT band tendinitis bilateral hips.    Plan: We discussed improvements that could be made in ergonomics of the seated position.  We discussed localized massage that can be done with a tennis ball against the wall over the area of the greater trochanter.  She will see physical therapy for a pelvifemoral alignment program.  We discussed adding maintenance exercises like yoga or Pilates to her exercise protocol.  She enjoys walking 1 to 2 miles for exercise each lunch at noon.  I encouraged her to stick to flat surfaces for her walking program rather than steep hills.  Follow-up with physical therapy.                      Again, thank you for allowing me to participate in the care of your patient.      Sincerely,    Edgardo Morel MD

## 2023-07-19 DIAGNOSIS — M67.952 TENDINOPATHY OF LEFT GLUTEUS MEDIUS: Primary | ICD-10-CM

## 2023-07-31 ASSESSMENT — ACTIVITIES OF DAILY LIVING (ADL)
PUTTING ON SOCKS AND SHOES: NO DIFFICULTY AT ALL
WALKING_INITIALLY: NO DIFFICULTY AT ALL
WALKING_APPROXIMATELY_10_MINUTES: NO DIFFICULTY AT ALL
STANDING FOR 15 MINUTES: SLIGHT DIFFICULTY
HOW_WOULD_YOU_RATE_YOUR_CURRENT_LEVEL_OF_FUNCTION_DURING_YOUR_USUAL_ACTIVITIES_OF_DAILY_LIVING_FROM_0_TO_100_WITH_100_BEING_YOUR_LEVEL_OF_FUNCTION_PRIOR_TO_YOUR_HIP_PROBLEM_AND_0_BEING_THE_INABILITY_TO_PERFORM_ANY_OF_YOUR_USUAL_DAILY_ACTIVITIES?: 80
ROLLING OVER IN BED: SLIGHT DIFFICULTY
STEPPING UP AND DOWN CURBS: NO DIFFICULTY AT ALL
HEAVY_WORK: MODERATE DIFFICULTY
GETTING_INTO_AND_OUT_OF_A_BATHTUB: NO DIFFICULTY AT ALL
GETTING INTO AND OUT OF AN AVERAGE CAR: NO DIFFICULTY AT ALL
HOS_ADL_SCORE(%): 79.41
WALKING_15_MINUTES_OR_GREATER: SLIGHT DIFFICULTY
TWISTING/PIVOTING ON INVOLVED LEG: SLIGHT DIFFICULTY
RECREATIONAL ACTIVITIES: SLIGHT DIFFICULTY
DEEP SQUATTING: MODERATE DIFFICULTY
LIGHT_TO_MODERATE_WORK: SLIGHT DIFFICULTY
GOING UP 1 FLIGHT OF STAIRS: SLIGHT DIFFICULTY
WALKING_DOWN_STEEP_HILLS: SLIGHT DIFFICULTY
SITTING FOR 15 MINUTES: SLIGHT DIFFICULTY
GOING DOWN 1 FLIGHT OF STAIRS: SLIGHT DIFFICULTY
HOS_ADL_HIGHEST_POTENTIAL_SCORE: 68
WALKING_UP_STEEP_HILLS: SLIGHT DIFFICULTY
HOS_ADL_ITEM_SCORE_TOTAL: 54

## 2023-08-07 ENCOUNTER — THERAPY VISIT (OUTPATIENT)
Dept: PHYSICAL THERAPY | Facility: CLINIC | Age: 48
End: 2023-08-07
Attending: FAMILY MEDICINE
Payer: COMMERCIAL

## 2023-08-07 DIAGNOSIS — M67.951 TENDINOPATHY OF RIGHT GLUTEUS MEDIUS: ICD-10-CM

## 2023-08-07 DIAGNOSIS — M76.30 ILIOTIBIAL BAND SYNDROME, UNSPECIFIED LATERALITY: ICD-10-CM

## 2023-08-07 DIAGNOSIS — M67.952 TENDINOPATHY OF LEFT GLUTEUS MEDIUS: ICD-10-CM

## 2023-08-07 PROCEDURE — 97110 THERAPEUTIC EXERCISES: CPT | Mod: GP

## 2023-08-07 PROCEDURE — 97161 PT EVAL LOW COMPLEX 20 MIN: CPT | Mod: GP

## 2023-08-07 NOTE — PROGRESS NOTES
"PHYSICAL THERAPY EVALUATION  Type of Visit: Evaluation    See electronic medical record for Abuse and Falls Screening details.    Subjective  Patient presents with B hip pain. Since college age has had \"achey\" hips after extended standing. It has gotten more bothersome in the last 9 months or so. It bothersome more at night when she is trying to sleep. Reports history of tailbone injury in 2001. Every since has had pelvic/groin pain. This would also get aggravated with starting new exercise routines, pregnancy. This pelvic pain is really sharp and fast, this has not worsened recently.  Walks 2 miles daily for exercise.      Presenting condition or subjective complaint: Hip bursitis?  Date of onset:      Relevant medical history:     Dates & types of surgery:      Prior diagnostic imaging/testing results: X-ray     Prior therapy history for the same diagnosis, illness or injury: No      Prior Level of Function  Transfers: Independent  Ambulation: Independent  ADL: Independent  IADL:  Independent    Living Environment  Social support: With a significant other or spouse   Type of home: House   Stairs to enter the home: Yes 12 Is there a railing: Yes   Ramp: No   Stairs inside the home: Yes 12 Is there a railing: Yes   Help at home: None  Equipment owned:       Employment: Yes   Hobbies/Interests:      Patient goals for therapy: Not feel any discomfort walking, sitting, standing or laying in bed    Pain assessment: Pain present     Objective   LUMBAR SPINE EVALUATION  PAIN: Pain Level at Rest: 1/10  Pain Level with Use: 7/10  Pain Location: lateral hips, sacral area, wraps around the front of pelvis/waist  Pain Quality: Stabbing and pulsing  Pain Frequency: constant  Pain is Worst: nighttime  Pain is Exacerbated By: Extended sitting, standing, lying on sides, walking  Pain is Relieved By: stretch and Advil  Pain Progression: Worsened  INTEGUMENTARY (edema, incisions):   POSTURE:   GAIT:   Weightbearing Status: "   Assistive Device(s):   Gait Deviations:   BALANCE/PROPRIOCEPTION: Single Leg Stance Eyes Open (seconds): proprioceptive difficulty  WEIGHTBEARING ALIGNMENT: WNL  NON-WEIGHTBEARING ALIGNMENT:    ROM:     Left Right   Hip Flexion (PROM) WNL WNL   Hip Extension (PROM)     Hip Internal Rotation (PROM) WNL WNL   Hip External Rotation (PROM) WNL WNL   Lumbar Rotation WNL WNL   Lumbar Sidebend WNL WNL   Lumbar Flexion WNL   Lumbar Extension WNL   *indicates pain    PELVIC/SI SCREEN:   STRENGTH:   Lower Extremity Muscle Strength   Left Right   Hip Flex 5/5 5-/5* pain on L low back   Hip Ext 3+/5 3+/5   Hip ER 5-/5 5-/5   Hip IR 5/5 5/5   Hip ABD 3+/5 3+/5   Hip ADD /5 /5   Knee Ext 5/5 5/5   Knee Flex 5/5 5/5         MYOTOMES:   DTR S:   CORD SIGNS:   DERMATOMES:   NEURAL TENSION: Lumbar WNL  FLEXIBILITY:  tightness ASHLEIGH hamstring and quads  LUMBAR/HIP Special Tests:  Negative B FADIR, resisted SLR, and log roll; Positive B JAMES     PELVIS/SI SPECIAL TESTS:   FUNCTIONAL TESTS: Double Leg Squat: Good technique/no significant findings  PALPATION:   + Tenderness At Location Left Right   Quadratus Lumborum - -   Erector Spinae     Piriformis      PSIS + +   ASIS     Iliac Crest     Glut Medius + +   Greater Trochanter + +   ITB + +   Hamstrings     Hip Flexors     Vertebral        SPINAL SEGMENTAL CONCLUSIONS:       Assessment & Plan   CLINICAL IMPRESSIONS  Medical Diagnosis: Tendinopathy of left gluteus medius  Tendinopathy of right gluteus medius    Treatment Diagnosis: B hip pain, LBP   Impression/Assessment: Patient is a 47 year old female with B hip and low back complaints.  The following significant findings have been identified: Pain, Decreased ROM/flexibility, Decreased strength, Impaired balance, and Decreased activity tolerance. These impairments interfere with their ability to perform self care tasks, work tasks, recreational activities, household chores, driving , household mobility, and community mobility as  compared to previous level of function.     Clinical Decision Making (Complexity):  Clinical Presentation: Stable/Uncomplicated  Clinical Presentation Rationale: based on medical and personal factors listed in PT evaluation  Clinical Decision Making (Complexity): Low complexity    PLAN OF CARE  Treatment Interventions:  Interventions: Gait Training, Manual Therapy, Neuromuscular Re-education, Therapeutic Activity, Therapeutic Exercise, Self-Care/Home Management    Long Term Goals     PT Goal 1  Goal Identifier: Standing  Goal Description: Patient will be able to stand for at least 1/2 hour w/o increased pain  Rationale: to maximize safety and independence with performance of ADLs and functional tasks;to maximize safety and independence within the home;to maximize safety and independence within the community;to maximize safety and independence with self cares  Target Date: 10/02/23      Frequency of Treatment: 1x/week  Duration of Treatment: 8 weeks    Recommended Referrals to Other Professionals:  none  Education Assessment:        Risks and benefits of evaluation/treatment have been explained.   Patient/Family/caregiver agrees with Plan of Care.     Evaluation Time:     PT Eval, Low Complexity Minutes (97825): 20       Signing Clinician: Kelly Bruno PT

## 2023-08-14 ENCOUNTER — THERAPY VISIT (OUTPATIENT)
Dept: PHYSICAL THERAPY | Facility: CLINIC | Age: 48
End: 2023-08-14
Attending: FAMILY MEDICINE
Payer: COMMERCIAL

## 2023-08-14 DIAGNOSIS — M67.952 TENDINOPATHY OF LEFT GLUTEUS MEDIUS: Primary | ICD-10-CM

## 2023-08-14 DIAGNOSIS — M67.951 TENDINOPATHY OF RIGHT GLUTEUS MEDIUS: ICD-10-CM

## 2023-08-14 PROCEDURE — 97110 THERAPEUTIC EXERCISES: CPT | Mod: GP

## 2023-08-21 ENCOUNTER — THERAPY VISIT (OUTPATIENT)
Dept: PHYSICAL THERAPY | Facility: CLINIC | Age: 48
End: 2023-08-21
Attending: FAMILY MEDICINE
Payer: COMMERCIAL

## 2023-08-21 DIAGNOSIS — M67.951 TENDINOPATHY OF RIGHT GLUTEUS MEDIUS: Primary | ICD-10-CM

## 2023-08-21 DIAGNOSIS — M67.952 TENDINOPATHY OF LEFT GLUTEUS MEDIUS: ICD-10-CM

## 2023-08-21 PROCEDURE — 97110 THERAPEUTIC EXERCISES: CPT | Mod: GP

## 2023-08-21 PROCEDURE — 97140 MANUAL THERAPY 1/> REGIONS: CPT | Mod: GP

## 2023-08-28 ENCOUNTER — PATIENT OUTREACH (OUTPATIENT)
Dept: CARE COORDINATION | Facility: CLINIC | Age: 48
End: 2023-08-28
Payer: COMMERCIAL

## 2023-09-08 ENCOUNTER — THERAPY VISIT (OUTPATIENT)
Dept: PHYSICAL THERAPY | Facility: CLINIC | Age: 48
End: 2023-09-08
Payer: COMMERCIAL

## 2023-09-08 DIAGNOSIS — M67.952 TENDINOPATHY OF LEFT GLUTEUS MEDIUS: Primary | ICD-10-CM

## 2023-09-08 DIAGNOSIS — M67.951 TENDINOPATHY OF RIGHT GLUTEUS MEDIUS: ICD-10-CM

## 2023-09-08 PROCEDURE — 97110 THERAPEUTIC EXERCISES: CPT | Mod: GP

## 2023-09-20 ENCOUNTER — THERAPY VISIT (OUTPATIENT)
Dept: PHYSICAL THERAPY | Facility: CLINIC | Age: 48
End: 2023-09-20
Payer: COMMERCIAL

## 2023-09-20 DIAGNOSIS — M67.952 TENDINOPATHY OF LEFT GLUTEUS MEDIUS: ICD-10-CM

## 2023-09-20 DIAGNOSIS — M67.951 TENDINOPATHY OF RIGHT GLUTEUS MEDIUS: Primary | ICD-10-CM

## 2023-09-20 PROCEDURE — 97110 THERAPEUTIC EXERCISES: CPT | Mod: GP

## 2023-10-11 ENCOUNTER — ANCILLARY PROCEDURE (OUTPATIENT)
Dept: MAMMOGRAPHY | Facility: CLINIC | Age: 48
End: 2023-10-11
Attending: FAMILY MEDICINE
Payer: COMMERCIAL

## 2023-10-11 DIAGNOSIS — Z12.31 VISIT FOR SCREENING MAMMOGRAM: ICD-10-CM

## 2023-10-11 PROCEDURE — 77067 SCR MAMMO BI INCL CAD: CPT

## 2023-10-11 PROCEDURE — 77063 BREAST TOMOSYNTHESIS BI: CPT | Mod: 26

## 2023-10-11 PROCEDURE — 77067 SCR MAMMO BI INCL CAD: CPT | Mod: 26

## 2023-10-17 ENCOUNTER — VIRTUAL VISIT (OUTPATIENT)
Dept: NEUROLOGY | Facility: CLINIC | Age: 48
End: 2023-10-17
Payer: COMMERCIAL

## 2023-10-17 DIAGNOSIS — R25.3 BENIGN FASCICULATION-CRAMP SYNDROME: Primary | ICD-10-CM

## 2023-10-17 PROCEDURE — 99213 OFFICE O/P EST LOW 20 MIN: CPT | Mod: VID | Performed by: PSYCHIATRY & NEUROLOGY

## 2023-10-17 NOTE — PATIENT INSTRUCTIONS
Consider complex b-vitamin supplementation for symptom control. Otherwise, you can return to clinic if you wish to trial a medication like gabapentin or diltiazem or carbamazepine in the future

## 2023-10-17 NOTE — PROGRESS NOTES
Virtual Visit Details    Type of service:  Video Visit     Originating Location (pt. Location): Home    Distant Location (provider location):  On-site  Platform used for Video Visit: Mary

## 2023-10-17 NOTE — LETTER
10/17/2023       RE: Sharon Waters  [address]  [place] [address]     Dear Colleague,    Thank you for referring your patient, Sharon Waters, to the [facility] [place] at [facility]. Please see a copy of my visit note below.    [facility] Neurology Follow Up Visit    Sharon Waters MRN# [id number]   Age: 48 year old YOB: 1975     Brief history of symptoms: The patient was initially seen in neurologic consultation on 10/28/2022 and most recently 4/13/2023 for evaluation of neuropathy like symptoms. Please see the comprehensive neurologic consultation notes from those dates in the Epic records for details.     Overall impression was that the patient had cramp-fasciculations syndrome given normal EMG (1/9/2023), MRI brain (1/9/2023), and serum studies.   I gave her some information about possible treatments for cramps but she was wanting to hold off on treatment at that time.  She has continued to work with PT for issues related to gluteus medius tendinitis/IT band tendinitis of the b/l hips (7/15/2023).    Today, the patient is attending PT to help with hip pain.  At night, she still has issues with this pain specifically.  Her fasciculations are still present but aren't necessarily symptomatic. She still gets cramps, but she isn't to the point of considering medications. New cramps are notably occurring in muscles that are unfamiliar to her (calf but deep or lateralized).  These are mostly occurring at night.      Physical Exam:   General: Seated comfortably in no acute distress.  Neurologic:     Mental Status: Fully alert, attentive and oriented. Speech clear and fluent, no paraphasic errors.     Cranial Nerves: EOMI with normal smooth pursuit. Facial movements symmetric. Hearing not formally tested but intact to conversation.  No dysarthria.     Motor: No tremors or other abnormal movements observed.           Assessment and Plan:   Assessment:  Benign fasciculation-cramp syndrome    We discussed b-complex vitamin supplementation now that her b6 level looks to be normal, as this can often help reduce cramps.  She will look into this treatment option. Otherwise, she is not at the point of wanting to use a medication like a calcium channel blocker, or gabapentin for treatment.  She will let me know if this opinion changes.     Plan:  Follow up in Neurology clinic as needed should new concerns arise.    PRIYA Villarreal D.O.   of Neurology    Total time today (27 min) in this patient encounter was spent on pre-charting, counseling and/or coordination of care.           Again, thank you for allowing me to participate in the care of your patient.      Sincerely,    Ken Villarreal, DO

## 2023-10-17 NOTE — PROGRESS NOTES
[facility] Neurology Follow Up Visit    Sharon Waters MRN# [id number]   Age: 48 year old YOB: 1975     Brief history of symptoms: The patient was initially seen in neurologic consultation on 10/28/2022 and most recently 4/13/2023 for evaluation of neuropathy like symptoms. Please see the comprehensive neurologic consultation notes from those dates in the Epic records for details.     Overall impression was that the patient had cramp-fasciculations syndrome given normal EMG (1/9/2023), MRI brain (1/9/2023), and serum studies.   I gave her some information about possible treatments for cramps but she was wanting to hold off on treatment at that time.  She has continued to work with PT for issues related to gluteus medius tendinitis/IT band tendinitis of the b/l hips (7/15/2023).    Today, the patient is attending PT to help with hip pain.  At night, she still has issues with this pain specifically.  Her fasciculations are still present but aren't necessarily symptomatic. She still gets cramps, but she isn't to the point of considering medications. New cramps are notably occurring in muscles that are unfamiliar to her (calf but deep or lateralized).  These are mostly occurring at night.      Physical Exam:   General: Seated comfortably in no acute distress.  Neurologic:     Mental Status: Fully alert, attentive and oriented. Speech clear and fluent, no paraphasic errors.     Cranial Nerves: EOMI with normal smooth pursuit. Facial movements symmetric. Hearing not formally tested but intact to conversation.  No dysarthria.     Motor: No tremors or other abnormal movements observed.          Assessment and Plan:   Assessment:  Benign fasciculation-cramp syndrome    We discussed b-complex vitamin supplementation now that her b6 level looks to be normal, as this can often help reduce cramps.  She will look into this treatment option. Otherwise, she is not at the point of wanting to use a medication like a  calcium channel blocker, or gabapentin for treatment.  She will let me know if this opinion changes.     Plan:  Follow up in Neurology clinic as needed should new concerns arise.    PRIYA Villarreal D.O.   of Neurology    Total time today (27 min) in this patient encounter was spent on pre-charting, counseling and/or coordination of care.

## 2023-10-17 NOTE — NURSING NOTE
Pt states that both legs are still switching, but no pain    Is the patient currently in the state of [place]? YES    Visit mode:VIDEO    If the visit is dropped, the patient can be reconnected by: VIDEO VISIT: Text to cell phone:   Telephone Information:   Mobile [phone]       Will anyone else be joining the visit? NO  (If patient encounters technical issues they should call [phone]    How would you like to obtain your AVS? MyChart    Are changes needed to the allergy or medication list? Pt stated no changes to allergies and Pt stated no med changes    Reason for visit: RECHECK (6 month follow-up )    Laney DIAZF

## 2023-10-24 ENCOUNTER — TELEPHONE (OUTPATIENT)
Dept: FAMILY MEDICINE | Facility: CLINIC | Age: 48
End: 2023-10-24

## 2023-10-24 ENCOUNTER — PATIENT OUTREACH (OUTPATIENT)
Dept: GASTROENTEROLOGY | Facility: CLINIC | Age: 48
End: 2023-10-24
Payer: COMMERCIAL

## 2023-10-24 DIAGNOSIS — N39.3 SUI (STRESS URINARY INCONTINENCE, FEMALE): Primary | ICD-10-CM

## 2023-10-24 NOTE — TELEPHONE ENCOUNTER
Routing to Dr Senior.    Jade is calling from [facility].  Patient is needing a pelvic floor physical therapy order for stress urinary incontinence.  Patient is scheduled in the morning at 7:30am and they are needing this order done today.    SALVADOR Cheng  [facility]

## 2023-10-25 ENCOUNTER — THERAPY VISIT (OUTPATIENT)
Dept: PHYSICAL THERAPY | Facility: CLINIC | Age: 48
End: 2023-10-25
Payer: COMMERCIAL

## 2023-10-25 DIAGNOSIS — N39.3 SUI (STRESS URINARY INCONTINENCE, FEMALE): ICD-10-CM

## 2023-10-25 PROCEDURE — 97161 PT EVAL LOW COMPLEX 20 MIN: CPT | Mod: GP | Performed by: PHYSICAL THERAPIST

## 2023-10-25 PROCEDURE — 97530 THERAPEUTIC ACTIVITIES: CPT | Mod: GP | Performed by: PHYSICAL THERAPIST

## 2023-10-25 PROCEDURE — 97110 THERAPEUTIC EXERCISES: CPT | Mod: GP | Performed by: PHYSICAL THERAPIST

## 2023-10-25 NOTE — PROGRESS NOTES
"PHYSICAL THERAPY EVALUATION  Type of Visit: Evaluation    See electronic medical record for Abuse and Falls Screening details.    Subjective   Patient reports that she was seeing another therapist for glute med and posterior hip pain and was explaining that she has tailbone pain that is aggravated with starting or stopping exercises and when she was pregnant.  She does also report some shooting pain that comes up from the pelvic floor area and shoots up her body,  usually they happen in waves or cycles with no indication for pain.  She reports that she sneezes very loudly so that she doesn't have any urinary leakage, has leakage with trying to hold in the sneeze.  Leaking urine about a couple times per week, more if she is sick and had to put pads  in.  She does also have urgency and when she starts thinking about it, she has leakage.  She has had \"weird relationship with bowel movements for most of her life\" was going every 2-3 day and has changed with pandemic and is going regularly every day in mornings like clock work as she works from home.  Can feel backed up if schedule is thrown off.  Mac is angry right now, started taking a pilates class last week.  .  17 and 14 year old twins.  History of GILLIAN.        Presenting condition or subjective complaint: Recommended by my PT (for hip bursitis) based on symptoms I had shared.  Date of onset: 10/24/23    Relevant medical history: Overweight; Pain at night or rest   Dates & types of surgery:      Prior diagnostic imaging/testing results:       Prior therapy history for the same diagnosis, illness or injury: No      Prior Level of Function  Transfers: Independent  Ambulation: Independent  ADL: Independent  IADL: Childcare, Driving, Finances, Housekeeping, Laundry, Meal preparation, Work    Living Environment  Social support: With a significant other or spouse   Type of home: House   Stairs to enter the home: Yes 6 Is there a railing: Yes   Ramp: No "   Stairs inside the home: Yes 12 Is there a railing: Yes   Help at home:    Equipment owned:       Employment: Yes   Hobbies/Interests: reading, movie/television, my children LOL    Patient goals for therapy: Sneeze or cough without peeing my pants    Pain assessment: See objective evaluation for additional pain details     Objective      PELVIC EVALUATION  ADDITIONAL HISTORY:  Sex assigned at birth: Female  Gender identity: Female    Pronouns: She/Her Hers      Bladder History:  Feels bladder filling: Yes  Triggers for feeling of inability to wait to go to the bathroom: No    How long can you wait to urinate:    Gets up at night to urinate: No    Can stop the flow of urine when urinating: No  Volume of urine usually released: Large   Other issues: Trouble emptying bladder completely; Dribbling after urinating  Number of bladder infections in last 12 months:    Fluid intake per day: 16 oz 16 oz    Medications taken for bladder: No     Activities causing urine leak: Sneeze; Jump; Run; Hurrying to the bathroom due to a strong urge to urinate (pee)    Amount of urine typically leaked: it depends...but average is enough to make a small wet spot on my underpants  Pads used to help with leaking: No        Bowel History:  Frequency of bowel movement: 1x per day  Consistency of stool: Soft-formed    Ignores the urge to defecate: Sometimes  Other bowel issues: Loss of stool  Length of time spent trying to have a bowel movement:      Sexual Function History:  Sexual orientation: Straight    Sexually active: Yes  Lubrication used: No No  Pelvic pain: Walking; Standing; Sitting    Pain or difficulty with orgasms/erection/ejaculation: No    State of menopause: Perimenopause (have not gone through menopause yet)  Hormone medications: No      Are you currently pregnant: No, Number of previous pregnancies: 2, Number of deliveries: 3, If you have delivered before, did you have any of these issues during delivery: Tearing;  Vaginal delivery, Have you been diagnosed with pelvic prolapse or abdominal separation: Yes, Do you get regular exercise: No, Have you tried pelvic floor strengthening exercises for 4 weeks: No, Do you have any history of trauma that is relevant to your care that you d like to share: No    Discussed reason for referral regarding pelvic health needs and external/internal pelvic floor muscle examination with patient/guardian.  Opportunity provided to ask questions and verbal consent for assessment and intervention was given.    PAIN: Pain Level at Rest: 3/10  Pain Level with Use: 5/10  Pain Location: pelvis/glute  Pain Quality: Aching  Pain Frequency: intermittent  Pain is Worst: daytime  Pain is Exacerbated By:    Pain is Relieved By:    Pain Progression: Unchanged  POSTURE: Sitting Posture: Thoracic kyphosis increased  LUMBAR SCREEN:   (Degrees) Left AROM Left PROM  Right AROM Right PROM   Hip Flexion       Hip Extension       Hip Abduction       Hip Adduction       Hip Internal Rotation       Hip External Rotation       Knee Flexion       Knee Extension       Lumbar Side glide     Lumbar Flexion    Lumbar Extension    Pain:   End feel:   HIP SCREEN:  Strength:   Pain: - none + mild ++ moderate +++ severe  Strength Scale: 0-5/5 Left Right   Hip Flexion 5 5   Hip Extension     Hip Abduction 4 4+   Hip Adduction     Hip Internal Rotation 5- 5-   Hip External Rotation 5- 5-   Knee Flexion 5 5   Knee Extension 5 5      Functional Strength Testing: SLS: WNL    PELVIC/SI SCREEN:  WNL   PAIN PROVOCATION TEST:   PELVIS/SI SPECIAL TESTS:   BREATHING SYMMETRY: Decreased rib cage mobility    PELVIC EXAM  External Visual Inspection:  At rest: Normal  With voluntary pelvic floor contraction: Perineal elevation  Relaxation of PFM: Partial/delayed relaxation  With intra-abdominal pressure: Cough: Perineal descent  Valsalva: Perineal elevation  Bearing down as defecation: Perineal elevation    Integumentary:   Introitus:  Unremarkable    External Digital Palpation per Perineum:   Ischiocavernosis: Unremarkable  Bulbo cavernosis: Tightness  Transverse perineal: Tenderness  Levator ani: Tightness, Tenderness, Pain    Scar:   Location/Type:   Mobility:     Internal Digital Palpation:  Per Vagina:  Tenderness  Tone: high  Digital Muscle Performance: P (Power): 2  R (Repetitions): 3  F (Fast Twitch): 5  Relaxation Post-Contraction: Partial/delayed relaxation    Per Rectum: High tone L>R TTP to puborectalis, coccygeus         Pelvic Organ Prolapse:       ABDOMINAL ASSESSMENT  Diastasis Rectus Abdominis (GILLIAN):      Abdominal Activation/Strength:     Scar:   Location/Type:   Mobility:     Fascial Tension/Restriction/Tone:     BIOFEEDBACK:  Position:   Surface Electrodes:     Abdominals:     Perianals:       DERMATOMES:   DTR S:     Assessment & Plan   CLINICAL IMPRESSIONS  Medical Diagnosis: stress urinary incontinence    Treatment Diagnosis: PFD, TRICE   Impression/Assessment: Patient is a 48 year old female with pelvic floor complaints.  The following significant findings have been identified: Pain, Decreased ROM/flexibility, Decreased joint mobility, Decreased strength, Impaired muscle performance, and Decreased activity tolerance. These impairments interfere with their ability to perform self care tasks and recreational activities as compared to previous level of function.     Clinical Decision Making (Complexity):  Clinical Presentation: Stable/Uncomplicated  Clinical Presentation Rationale: based on medical and personal factors listed in PT evaluation  Clinical Decision Making (Complexity): Low complexity    PLAN OF CARE  Treatment Interventions:  Modalities: Biofeedback  Interventions: Manual Therapy, Neuromuscular Re-education, Therapeutic Activity, Therapeutic Exercise, Self-Care/Home Management    Long Term Goals     PT Goal 1  Goal Identifier: STG 1  Goal Description: Patient will decrease episodes of urinary incontinence to 0 times  in a week  Rationale: to maximize safety and independence with performance of ADLs and functional tasks      Frequency of Treatment: every 1-2 weeks  Duration of Treatment: 12 weeks    Recommended Referrals to Other Professionals:  No further referral needed   Education Assessment:   Learner/Method: Patient;Listening;Demonstration;Pictures/Video;No Barriers to Learning    Risks and benefits of evaluation/treatment have been explained.   Patient/Family/caregiver agrees with Plan of Care.     Evaluation Time:     PT Eval, Low Complexity Minutes (21964): 20   Present: Not applicable     Signing Clinician: Jade Granados PT

## 2023-11-03 ENCOUNTER — THERAPY VISIT (OUTPATIENT)
Dept: PHYSICAL THERAPY | Facility: CLINIC | Age: 48
End: 2023-11-03
Payer: COMMERCIAL

## 2023-11-03 DIAGNOSIS — N39.46 MIXED STRESS AND URGE URINARY INCONTINENCE: Primary | ICD-10-CM

## 2023-11-03 DIAGNOSIS — M53.3 PAIN IN THE COCCYX: ICD-10-CM

## 2023-11-03 DIAGNOSIS — M62.89 PELVIC FLOOR DYSFUNCTION: ICD-10-CM

## 2023-11-03 PROCEDURE — 97110 THERAPEUTIC EXERCISES: CPT | Mod: GP | Performed by: PHYSICAL THERAPIST

## 2023-11-03 PROCEDURE — 97530 THERAPEUTIC ACTIVITIES: CPT | Mod: GP | Performed by: PHYSICAL THERAPIST

## 2023-11-17 ENCOUNTER — THERAPY VISIT (OUTPATIENT)
Dept: PHYSICAL THERAPY | Facility: CLINIC | Age: 48
End: 2023-11-17
Payer: COMMERCIAL

## 2023-11-17 DIAGNOSIS — N39.46 MIXED STRESS AND URGE URINARY INCONTINENCE: Primary | ICD-10-CM

## 2023-11-17 DIAGNOSIS — M62.89 PELVIC FLOOR DYSFUNCTION: ICD-10-CM

## 2023-11-17 DIAGNOSIS — M53.3 PAIN IN THE COCCYX: ICD-10-CM

## 2023-11-17 PROCEDURE — 97140 MANUAL THERAPY 1/> REGIONS: CPT | Mod: GP | Performed by: PHYSICAL THERAPIST

## 2023-11-17 PROCEDURE — 97530 THERAPEUTIC ACTIVITIES: CPT | Mod: GP | Performed by: PHYSICAL THERAPIST

## 2023-11-17 PROCEDURE — 97110 THERAPEUTIC EXERCISES: CPT | Mod: GP | Performed by: PHYSICAL THERAPIST

## 2023-11-19 ENCOUNTER — APPOINTMENT (OUTPATIENT)
Dept: CT IMAGING | Facility: CLINIC | Age: 48
End: 2023-11-19
Attending: EMERGENCY MEDICINE
Payer: COMMERCIAL

## 2023-11-19 ENCOUNTER — HOSPITAL ENCOUNTER (EMERGENCY)
Facility: CLINIC | Age: 48
Discharge: HOME | End: 2023-11-19
Attending: EMERGENCY MEDICINE | Admitting: EMERGENCY MEDICINE
Payer: COMMERCIAL

## 2023-11-19 ENCOUNTER — APPOINTMENT (OUTPATIENT)
Dept: GENERAL RADIOLOGY | Facility: CLINIC | Age: 48
End: 2023-11-19
Attending: EMERGENCY MEDICINE
Payer: COMMERCIAL

## 2023-11-19 VITALS
DIASTOLIC BLOOD PRESSURE: 82 MMHG | WEIGHT: 198 LBS | TEMPERATURE: 97.8 F | OXYGEN SATURATION: 97 % | RESPIRATION RATE: 18 BRPM | BODY MASS INDEX: 29.24 KG/M2 | HEART RATE: 76 BPM | SYSTOLIC BLOOD PRESSURE: 136 MMHG

## 2023-11-19 DIAGNOSIS — M25.512 ACUTE PAIN OF LEFT SHOULDER: ICD-10-CM

## 2023-11-19 DIAGNOSIS — R07.9 CHEST PAIN, UNSPECIFIED TYPE: ICD-10-CM

## 2023-11-19 LAB
ALBUMIN SERPL BCG-MCNC: 4.7 G/DL (ref 3.5–5.2)
ALP SERPL-CCNC: 62 U/L (ref 40–150)
ALT SERPL W P-5'-P-CCNC: 16 U/L (ref 0–50)
ANION GAP SERPL CALCULATED.3IONS-SCNC: 6 MMOL/L (ref 7–15)
AST SERPL W P-5'-P-CCNC: 16 U/L (ref 0–45)
BASOPHILS # BLD AUTO: 0.1 10E3/UL (ref 0–0.2)
BASOPHILS NFR BLD AUTO: 1 %
BILIRUB SERPL-MCNC: 0.3 MG/DL
BUN SERPL-MCNC: 18.5 MG/DL (ref 6–20)
CALCIUM SERPL-MCNC: 8.9 MG/DL (ref 8.6–10)
CHLORIDE SERPL-SCNC: 104 MMOL/L (ref 98–107)
CREAT SERPL-MCNC: 1 MG/DL (ref 0.51–0.95)
D DIMER PPP FEU-MCNC: 0.99 UG/ML FEU (ref 0–0.5)
DEPRECATED HCO3 PLAS-SCNC: 28 MMOL/L (ref 22–29)
EGFRCR SERPLBLD CKD-EPI 2021: 69 ML/MIN/1.73M2
EOSINOPHIL # BLD AUTO: 0.2 10E3/UL (ref 0–0.7)
EOSINOPHIL NFR BLD AUTO: 3 %
ERYTHROCYTE [DISTWIDTH] IN BLOOD BY AUTOMATED COUNT: 11.7 % (ref 10–15)
GLUCOSE SERPL-MCNC: 83 MG/DL (ref 70–99)
HCG UR QL: NEGATIVE
HCT VFR BLD AUTO: 39.2 % (ref 35–47)
HGB BLD-MCNC: 13.5 G/DL (ref 11.7–15.7)
IMM GRANULOCYTES # BLD: 0 10E3/UL
IMM GRANULOCYTES NFR BLD: 0 %
INTERNAL QC OK POCT: ABNORMAL
LYMPHOCYTES # BLD AUTO: 1.8 10E3/UL (ref 0.8–5.3)
LYMPHOCYTES NFR BLD AUTO: 26 %
MCH RBC QN AUTO: 31.2 PG (ref 26.5–33)
MCHC RBC AUTO-ENTMCNC: 34.4 G/DL (ref 31.5–36.5)
MCV RBC AUTO: 91 FL (ref 78–100)
MONOCYTES # BLD AUTO: 0.4 10E3/UL (ref 0–1.3)
MONOCYTES NFR BLD AUTO: 7 %
NEUTROPHILS # BLD AUTO: 4.3 10E3/UL (ref 1.6–8.3)
NEUTROPHILS NFR BLD AUTO: 63 %
NRBC # BLD AUTO: 0 10E3/UL
NRBC BLD AUTO-RTO: 0 /100
PLATELET # BLD AUTO: 252 10E3/UL (ref 150–450)
POCT KIT EXPIRATION DATE: ABNORMAL
POCT KIT LOT NUMBER: ABNORMAL
POTASSIUM SERPL-SCNC: 4.3 MMOL/L (ref 3.4–5.3)
PROT SERPL-MCNC: 7 G/DL (ref 6.4–8.3)
RBC # BLD AUTO: 4.33 10E6/UL (ref 3.8–5.2)
SODIUM SERPL-SCNC: 138 MMOL/L (ref 135–145)
TROPONIN T SERPL HS-MCNC: <6 NG/L
WBC # BLD AUTO: 6.7 10E3/UL (ref 4–11)

## 2023-11-19 PROCEDURE — 85379 FIBRIN DEGRADATION QUANT: CPT | Performed by: EMERGENCY MEDICINE

## 2023-11-19 PROCEDURE — 36415 COLL VENOUS BLD VENIPUNCTURE: CPT | Performed by: EMERGENCY MEDICINE

## 2023-11-19 PROCEDURE — 82374 ASSAY BLOOD CARBON DIOXIDE: CPT | Performed by: EMERGENCY MEDICINE

## 2023-11-19 PROCEDURE — 93005 ELECTROCARDIOGRAM TRACING: CPT | Performed by: EMERGENCY MEDICINE

## 2023-11-19 PROCEDURE — 84484 ASSAY OF TROPONIN QUANT: CPT | Performed by: EMERGENCY MEDICINE

## 2023-11-19 PROCEDURE — 99283 EMERGENCY DEPT VISIT LOW MDM: CPT | Mod: 25 | Performed by: EMERGENCY MEDICINE

## 2023-11-19 PROCEDURE — 70498 CT ANGIOGRAPHY NECK: CPT

## 2023-11-19 PROCEDURE — 93010 ELECTROCARDIOGRAM REPORT: CPT | Performed by: EMERGENCY MEDICINE

## 2023-11-19 PROCEDURE — 71046 X-RAY EXAM CHEST 2 VIEWS: CPT

## 2023-11-19 PROCEDURE — 250N000009 HC RX 250: Performed by: EMERGENCY MEDICINE

## 2023-11-19 PROCEDURE — 81025 URINE PREGNANCY TEST: CPT | Performed by: EMERGENCY MEDICINE

## 2023-11-19 PROCEDURE — 82947 ASSAY GLUCOSE BLOOD QUANT: CPT | Performed by: EMERGENCY MEDICINE

## 2023-11-19 PROCEDURE — 82435 ASSAY OF BLOOD CHLORIDE: CPT | Performed by: EMERGENCY MEDICINE

## 2023-11-19 PROCEDURE — 250N000011 HC RX IP 250 OP 636: Mod: JZ | Performed by: EMERGENCY MEDICINE

## 2023-11-19 PROCEDURE — 99285 EMERGENCY DEPT VISIT HI MDM: CPT | Mod: 25 | Performed by: EMERGENCY MEDICINE

## 2023-11-19 PROCEDURE — 85018 HEMOGLOBIN: CPT | Performed by: EMERGENCY MEDICINE

## 2023-11-19 RX ORDER — IOPAMIDOL 755 MG/ML
100 INJECTION, SOLUTION INTRAVASCULAR ONCE
Status: COMPLETED | OUTPATIENT
Start: 2023-11-19 | End: 2023-11-19

## 2023-11-19 RX ADMIN — SODIUM CHLORIDE 80 ML: 9 INJECTION, SOLUTION INTRAVENOUS at 20:46

## 2023-11-19 RX ADMIN — IOPAMIDOL 67 ML: 755 INJECTION, SOLUTION INTRAVENOUS at 20:41

## 2023-11-19 ASSESSMENT — ACTIVITIES OF DAILY LIVING (ADL)
ADLS_ACUITY_SCORE: 35
ADLS_ACUITY_SCORE: 35
ADLS_ACUITY_SCORE: 33

## 2023-11-19 NOTE — ED PROVIDER NOTES
"ED Provider Note  [facility]      History     Chief Complaint   Patient presents with    Chest Wall Pain     About a week she developed a \"tinge\" of pain to left shoulder when emptying pots.  This resolved.  Three days ago she started having pain to left anterior and posterior shoulder and anterior chest wall that feels like a traveling burning sensation.  Does not feel short of breath.  Denies rash.     HPI  Sharon Waters is a 48 year old female who presents to the emergency department seeking evaluation of left sided shoulder pain, neck pain, and chest pain. She reports that this began 1 week ago as a mild shoulder strain and progressed. She describes the [pain as being a stinging/burning pain. She denies any trauma to the irritated area. She has not had anything like this happen to her before. She states that the pain is improved by pressure and exercised, and is exacerbated by staying sedentary. She denies any shortness of breath or sweating.        Past Medical History  Past Medical History:   Diagnosis Date    Uncomplicated asthma 1992    viral induced     Past Surgical History:   Procedure Laterality Date    COLONOSCOPY N/A 3/5/2021    Procedure: COLONOSCOPY, WITH POLYPECTOMY;  Surgeon: Jesse Asencio MD;  Location: [facility] OR    NO HISTORY OF SURGERY       cetirizine (ZYRTEC) 5 MG tablet  Cholecalciferol (VITAMIN D3 PO)  fluticasone (FLONASE) 50 MCG/ACT nasal spray  ketoconazole (NIZORAL) 2 % external cream  levonorgestrel (MIRENA) 20 MCG/DAY IUD  Multiple Vitamins-Minerals (ADULT ONE DAILY GUMMIES PO)  rizatriptan (MAXALT) 10 MG tablet      Allergies   Allergen Reactions    Seasonal Allergies      Family History  Family History   Problem Relation Age of Onset    Hypertension Mother     Colon Cancer Maternal Grandmother     Diabetes Paternal Grandmother     Breast Cancer Paternal Grandmother         in her 70s.     Social History   Social History     Tobacco Use    Smoking " status: Never    Smokeless tobacco: Never   Vaping Use    Vaping Use: Never used   Substance Use Topics    Alcohol use: Yes     Comment: 1-2 per week    Drug use: No      Past medical history, past surgical history, medications, allergies, family history, and social history were reviewed with the patient. No additional pertinent items.      A medically appropriate review of systems was performed with pertinent positives and negatives noted in the HPI, and all other systems negative.    Physical Exam   BP: 121/80  Pulse: 79  Temp: 98  F (36.7  C)  Resp: 16  Weight: 89.8 kg (198 lb)  SpO2: 96 %  Physical Exam  Vitals and nursing note reviewed.   Constitutional:       General: She is not in acute distress.  Neck:      Vascular: No carotid bruit.        Comments: Discomfort left upper chest with radiation into the left neck overlying the carotid symptoms of been in for about 1 week.  She also has pain to the same side scapula.  No history of trauma she has pain in that shoulder as well.  Cardiovascular:      Rate and Rhythm: Normal rate and regular rhythm.      Heart sounds: No murmur heard.  Pulmonary:      Effort: Pulmonary effort is normal.      Breath sounds: Normal breath sounds.   Musculoskeletal:      Cervical back: Normal range of motion and neck supple.      Comments: No pain with active or passive range of motion of the left shoulder no palpable abnormality or tenderness   Neurological:      General: No focal deficit present.      Mental Status: She is alert and oriented to person, place, and time.   Psychiatric:         Mood and Affect: Mood normal.         Behavior: Behavior normal.           ED Course, Procedures, & Data      Procedures                 EKG Interpretation:      Interpreted by Iban York MD  Time reviewed: 5:33PM 11/19/2023  Symptoms at time of EKG: Chest Wall Pain   Rhythm: normal sinus   Rate: normal  Axis: NORMAL  Ectopy: none  Conduction: normal  ST Segments/ T Waves: No ST-T wave  changes  Q Waves: none  Comparison to prior: No old EKG available    Clinical Impression: normal EKG         Results for orders placed or performed during the hospital encounter of 11/19/23   XR Chest 2 Views     Status: None    Narrative    EXAM: XR CHEST 2 VIEWS  LOCATION: [facility]  DATE: 11/19/2023    INDICATION: Left upper chest pain  COMPARISON: None.      Impression    IMPRESSION: Heart is normal in size. Lungs are clear.   CTA Neck with Contrast     Status: None    Narrative    EXAM: CTA NECK WITH CONTRAST  LOCATION: [facility]  DATE: 11/19/2023    INDICATION: Neck pain and left upper chest pain with concern for carotid dissection  COMPARISON: None.  CONTRAST: 67 mL Isovue 370  TECHNIQUE: Neck CT angiogram with IV contrast. Axial helical CT images of the neck vessels were obtained during the arterial phase of intravenous contrast administration. Axial helical 2D reconstructed images and multiplanar 3D MIP reconstructed images   of the neck vessels were performed by the technologist. Dose reduction techniques were used. All stenosis measurements made according to NASCET criteria unless otherwise specified.    FINDINGS:  RIGHT CAROTID: No measurable stenosis or dissection.    LEFT CAROTID: No measurable stenosis or dissection.    VERTEBRAL ARTERIES: No focal stenosis or dissection. Dominant left and smaller right vertebral arteries.    AORTIC ARCH: Classic aortic arch anatomy with no significant stenosis at the origin of the great vessels.    NONVASCULAR STRUCTURES: Unremarkable.      Impression    IMPRESSION:   1.  No carotid or vertebral artery injury.   Comprehensive metabolic panel     Status: Abnormal   Result Value Ref Range    Sodium 138 135 - 145 mmol/L    Potassium 4.3 3.4 - 5.3 mmol/L    Carbon Dioxide (CO2) 28 22 - 29 mmol/L    Anion Gap 6 (L) 7 - 15 mmol/L    Urea Nitrogen 18.5 6.0 - 20.0 mg/dL     Creatinine 1.00 (H) 0.51 - 0.95 mg/dL    GFR Estimate 69 >60 mL/min/1.73m2    Calcium 8.9 8.6 - 10.0 mg/dL    Chloride 104 98 - 107 mmol/L    Glucose 83 70 - 99 mg/dL    Alkaline Phosphatase 62 40 - 150 U/L    AST 16 0 - 45 U/L    ALT 16 0 - 50 U/L    Protein Total 7.0 6.4 - 8.3 g/dL    Albumin 4.7 3.5 - 5.2 g/dL    Bilirubin Total 0.3 <=1.2 mg/dL   Troponin T, High Sensitivity     Status: Normal   Result Value Ref Range    Troponin T, High Sensitivity <6 <=14 ng/L   D dimer quantitative     Status: Abnormal   Result Value Ref Range    D-Dimer Quantitative 0.99 (H) 0.00 - 0.50 ug/mL FEU    Narrative    This D-dimer assay is intended for use in conjunction with a clinical pretest probability assessment model to exclude pulmonary embolism (PE) and deep venous thrombosis (DVT) in outpatients suspected of PE or DVT. The cut-off value is 0.50 ug/mL FEU.   CBC with platelets and differential     Status: None   Result Value Ref Range    WBC Count 6.7 4.0 - 11.0 10e3/uL    RBC Count 4.33 3.80 - 5.20 10e6/uL    Hemoglobin 13.5 11.7 - 15.7 g/dL    Hematocrit 39.2 35.0 - 47.0 %    MCV 91 78 - 100 fL    MCH 31.2 26.5 - 33.0 pg    MCHC 34.4 31.5 - 36.5 g/dL    RDW 11.7 10.0 - 15.0 %    Platelet Count 252 150 - 450 10e3/uL    % Neutrophils 63 %    % Lymphocytes 26 %    % Monocytes 7 %    % Eosinophils 3 %    % Basophils 1 %    % Immature Granulocytes 0 %    NRBCs per 100 WBC 0 <1 /100    Absolute Neutrophils 4.3 1.6 - 8.3 10e3/uL    Absolute Lymphocytes 1.8 0.8 - 5.3 10e3/uL    Absolute Monocytes 0.4 0.0 - 1.3 10e3/uL    Absolute Eosinophils 0.2 0.0 - 0.7 10e3/uL    Absolute Basophils 0.1 0.0 - 0.2 10e3/uL    Absolute Immature Granulocytes 0.0 <=0.4 10e3/uL    Absolute NRBCs 0.0 10e3/uL   EKG 12 lead     Status: None (Preliminary result)   Result Value Ref Range    Systolic Blood Pressure  mmHg    Diastolic Blood Pressure  mmHg    Ventricular Rate 73 BPM    Atrial Rate 73 BPM    FL Interval 156 ms    QRS Duration 78 ms      ms    QTc 431 ms    P Axis 66 degrees    R AXIS 12 degrees    T Axis 45 degrees    Interpretation ECG Sinus rhythm  Normal ECG      hCG qual urine POCT     Status: Abnormal   Result Value Ref Range    HCG Qual Urine Negative (Neg) Negative    Internal QC Check POCT Valid (Neg) Valid    POCT Kit Lot Number [id number]     POCT Kit Expiration Date 5292025    CBC with platelets differential     Status: None    Narrative    The following orders were created for panel order CBC with platelets differential.  Procedure                               Abnormality         Status                     ---------                               -----------         ------                     CBC with platelets and d...[[id number]]                      Final result                 Please view results for these tests on the individual orders.     Medications   sodium chloride 0.9 % bag 500mL for CT scan flush use (80 mLs As instructed $Given 11/19/23 2046)   iopamidol (ISOVUE-370) solution 100 mL (67 mLs Intravenous $Given 11/19/23 2041)     Labs Ordered and Resulted from Time of ED Arrival to Time of ED Departure   COMPREHENSIVE METABOLIC PANEL - Abnormal       Result Value    Sodium 138      Potassium 4.3      Carbon Dioxide (CO2) 28      Anion Gap 6 (*)     Urea Nitrogen 18.5      Creatinine 1.00 (*)     GFR Estimate 69      Calcium 8.9      Chloride 104      Glucose 83      Alkaline Phosphatase 62      AST 16      ALT 16      Protein Total 7.0      Albumin 4.7      Bilirubin Total 0.3     D DIMER QUANTITATIVE - Abnormal    D-Dimer Quantitative 0.99 (*)    HCG QUALITATIVE URINE POCT - Abnormal    HCG Qual Urine Negative (*)     Internal QC Check POCT Valid (*)     POCT Kit Lot Number [id number]      POCT Kit Expiration Date 5292025     TROPONIN T, HIGH SENSITIVITY - Normal    Troponin T, High Sensitivity <6     CBC WITH PLATELETS AND DIFFERENTIAL    WBC Count 6.7      RBC Count 4.33      Hemoglobin 13.5      Hematocrit 39.2      MCV 91       MCH 31.2      MCHC 34.4      RDW 11.7      Platelet Count 252      % Neutrophils 63      % Lymphocytes 26      % Monocytes 7      % Eosinophils 3      % Basophils 1      % Immature Granulocytes 0      NRBCs per 100 WBC 0      Absolute Neutrophils 4.3      Absolute Lymphocytes 1.8      Absolute Monocytes 0.4      Absolute Eosinophils 0.2      Absolute Basophils 0.1      Absolute Immature Granulocytes 0.0      Absolute NRBCs 0.0     TROPONIN T, HIGH SENSITIVITY     CTA Neck with Contrast   Final Result   IMPRESSION:    1.  No carotid or vertebral artery injury.      XR Chest 2 Views   Final Result   IMPRESSION: Heart is normal in size. Lungs are clear.             Assessment & Plan    Ms. Waters is a 48-year-old female without significant past medical history who comes in complaining of discomfort in the left upper chest with radiation up into her neck in the area of the carotid artery.  She denied head trauma or chiropractic manipulation. she has no coronary history or risk factors.  Her work-up here showed a normal EKG, negative troponin, and normal chest x-ray. I did a CT angio of the neck, which was negative for carotid dissection or other abnormality. because of the contrast timing I was unable to do CT chest to rule out PE. she had a slightly elevated D-dimer.  She is not tachycardic or hypoxic. pain is not pleuritic. there is low probability, but not 0 for PE.  I told her that she could have the test done tomorrow either by returning to any emergency department or by contacting her primary care provider and asking them to order CT of the chest.  Differential for her discomfort at this point I think musculoskeletal is more common, but cannot exclude PE. its reassuring that she does not have tachycardia, pleuritic chest pain, or hypoxia.  She understands she can return to the emergency department anytime and she should proceed with the CT for PE.  This part of the medical record was transcribed by Bryanna Vu  Medical Scribe, from a dictation done by Iban York MD.     I have reviewed the nursing notes. I have reviewed the findings, diagnosis, plan and need for follow up with the patient.    New Prescriptions    No medications on file       Final diagnoses:   Chest pain, unspecified type   Acute pain of left shoulder   I, Pasquale Muse, am serving as a trained medical scribe to document services personally performed by Iban York MD, based on the provider's statements to me.     I, Iban York MD, was physically present and have reviewed and verified the accuracy of this note documented by Pasquale Muse.      Iban York MD  [facility] EMERGENCY DEPARTMENT  11/19/2023     Iban York MD  12/15/23 2601

## 2023-11-19 NOTE — ED TRIAGE NOTES
Triage Assessment (Adult)       Row Name 11/19/23 155          Triage Assessment    Airway WDL WDL        Respiratory WDL    Respiratory WDL WDL        Skin Circulation/Temperature WDL    Skin Circulation/Temperature WDL WDL        Cardiac WDL    Cardiac WDL WDL        Peripheral/Neurovascular WDL    Peripheral Neurovascular WDL WDL        Cognitive/Neuro/Behavioral WDL    Cognitive/Neuro/Behavioral WDL WDL

## 2023-11-20 LAB
ATRIAL RATE - MUSE: 73 BPM
DIASTOLIC BLOOD PRESSURE - MUSE: NORMAL MMHG
INTERPRETATION ECG - MUSE: NORMAL
P AXIS - MUSE: 66 DEGREES
PR INTERVAL - MUSE: 156 MS
QRS DURATION - MUSE: 78 MS
QT - MUSE: 392 MS
QTC - MUSE: 431 MS
R AXIS - MUSE: 12 DEGREES
SYSTOLIC BLOOD PRESSURE - MUSE: NORMAL MMHG
T AXIS - MUSE: 45 DEGREES
VENTRICULAR RATE- MUSE: 73 BPM

## 2023-11-20 NOTE — DISCHARGE INSTRUCTIONS
Your EKG chest x-ray routine labs and blood test called troponin for heart injury are all normal  No evidence for carotid dissection/abnormality seen on the CT scan  We were unable to scan your chest for pulmonary embolism  You can have this done tomorrow or call your primary care provider and asked them to order a CT of the chest to evaluate for pulmonary embolism

## 2023-11-21 ENCOUNTER — OFFICE VISIT (OUTPATIENT)
Dept: FAMILY MEDICINE | Facility: CLINIC | Age: 48
End: 2023-11-21
Payer: COMMERCIAL

## 2023-11-21 VITALS
WEIGHT: 201.4 LBS | SYSTOLIC BLOOD PRESSURE: 120 MMHG | BODY MASS INDEX: 29.83 KG/M2 | TEMPERATURE: 97.4 F | HEART RATE: 69 BPM | HEIGHT: 69 IN | RESPIRATION RATE: 22 BRPM | OXYGEN SATURATION: 99 % | DIASTOLIC BLOOD PRESSURE: 85 MMHG

## 2023-11-21 DIAGNOSIS — R79.89 ELEVATED D-DIMER: ICD-10-CM

## 2023-11-21 DIAGNOSIS — R07.9 CHEST PAIN, UNSPECIFIED TYPE: Primary | ICD-10-CM

## 2023-11-21 DIAGNOSIS — M25.512 ACUTE PAIN OF LEFT SHOULDER: ICD-10-CM

## 2023-11-21 LAB — D DIMER PPP FEU-MCNC: 0.35 UG/ML FEU (ref 0–0.5)

## 2023-11-21 PROCEDURE — 36415 COLL VENOUS BLD VENIPUNCTURE: CPT | Performed by: NURSE PRACTITIONER

## 2023-11-21 PROCEDURE — 99213 OFFICE O/P EST LOW 20 MIN: CPT | Performed by: NURSE PRACTITIONER

## 2023-11-21 PROCEDURE — 85379 FIBRIN DEGRADATION QUANT: CPT | Performed by: NURSE PRACTITIONER

## 2023-11-21 ASSESSMENT — PAIN SCALES - GENERAL: PAINLEVEL: MILD PAIN (2)

## 2023-11-21 ASSESSMENT — ENCOUNTER SYMPTOMS
RESPIRATORY NEGATIVE: 1
CONSTITUTIONAL NEGATIVE: 1

## 2023-11-21 NOTE — PROGRESS NOTES
"  Assessment & Plan     Chest pain, unspecified type  Improving. Still with chest pain that comes and goes throughout the day \"comes in waves\". She was told to follow-up for CT chest to r/o PE. Given her recent travel and her symptoms, CT scan is appropriate to r/o concerning PE symptoms. Orders placed. Also rechecking d-dimer for comparison. No urgent symptoms at this time, patient over all feeling better and no shortness of breath.     - D dimer, quantitative  - CT Chest Pulmonary Embolism w Contrast; Future    Elevated d-dimer  Will recheck lab today. Likely just falsely elevated.     - D dimer, quantitative    Acute pain of left shoulder  Left shoulder pain with active forward ROM. Possible muscle/nerve related. She is going to try an NSAID 1-2 times a day for 1-2 weeks with food to see if it is inflammation related. If symptoms do not improve, advised she follow-up in 2 weeks.     Ordering of each unique test       MED REC REQUIRED  Post Medication Reconciliation Status:  Discharge medications reconciled, continue medications without change  BMI:   Estimated body mass index is 29.74 kg/m  as calculated from the following:    Height as of this encounter: 1.753 m (5' 9\").    Weight as of this encounter: 91.4 kg (201 lb 6.4 oz).       Patient Instructions   CT scan ordered. Please call [phone] to schedule your appointment.     If symptoms get worse or you develop fevers, please go to urgent care or ER.      Labs done.     If symptoms do not improve, please follow-up in 1-2 weeks.     Karolyn Skinner DNP  [facility] is a 48 year old, presenting for the following health issues:  ER F/U        11/21/2023    10:19 AM   Additional Questions   Roomed by Kait   Accompanied by none         11/21/2023    10:19 AM   Patient Reported Additional Medications   Patient reports taking the following new medications none       HPI       ED/UC Followup:    Facility:  " [facility] ER  Date of visit: 11/19/23  Reason for visit: chest pain, shoulder and clavicle, going up into the neck   Current Status: still mild pain in waves off and on, no known injury    ER note from 11/19/2023:  TILA Dawkinsruben Waters is a 48 year old female who presents to the emergency department seeking evaluation of left sided shoulder pain, neck pain, and chest pain. She reports that this began 1 week ago as a mild shoulder strain and progressed. She describes the [pain as being a stinging/burning pain. She denies any trauma to the irritated area. She has not had anything like this happen to her before. She states that the pain is improved by pressure and exercised, and is exacerbated by staying sedentary. She denies any shortness of breath or sweating.      Assessment & Plan    Ms. Waters is a 48-year-old female without significant past medical history who comes in complaining of discomfort in the left upper chest with radiation up into her neck in the area of the carotid artery.  She denied head trauma or chiropractic manipulation. she has no coronary history or risk factors.  Her work-up here showed a normal EKG, negative troponin, and normal chest x-ray. I did a CT angio of the neck, which was negative for carotid dissection or other abnormality. because of the contrast timing I was unable to do CT chest to rule out PE. she had a slightly elevated D-dimer.  She is not tachycardic or hypoxic. pain is not pleuritic. there is low probability, but not 0 for PE.  I told her that she could have the test done tomorrow either by returning to any emergency department or by contacting her primary care provider and asking them to order CT of the chest.  Differential for her discomfort at this point I think musculoskeletal is more common, but cannot exclude PE. its reassuring that she does not have tachycardia, pleuritic chest pain, or hypoxia.  She understands she can return to the emergency department  "anytime and she should proceed with the CT for PE.  This part of the medical record was transcribed by Bryanna Vu Medical Scribe, from a dictation done by Iban York MD.     -------------------------------------------------------------------------------------  Additional provider notes: Patient presents in clinic for follow-up from ER visit on 11/19/23. Symptoms started Friday/Saturday.   -chest pain comes and goes in waves (\"stinging, biting sensation\")  over the past 2 days. Feels her shoulder is a little stiff.     Flew home on 11/10/23 (3 hours flight), then immediately sat at a swim meet for another 3 hours.     Hx of muscle cramps. She did have a massive charley horse in her left leg on 11/12/23 ongoing for several days. She felt like she had a \"huge knot\". The knot/symptoms have completely resolved.     Allergies   Allergen Reactions    Seasonal Allergies        Current Outpatient Medications   Medication    cetirizine (ZYRTEC) 5 MG tablet    levonorgestrel (MIRENA) 20 MCG/DAY IUD    rizatriptan (MAXALT) 10 MG tablet    Cholecalciferol (VITAMIN D3 PO)    fluticasone (FLONASE) 50 MCG/ACT nasal spray    ketoconazole (NIZORAL) 2 % external cream    Multiple Vitamins-Minerals (ADULT ONE DAILY GUMMIES PO)     No current facility-administered medications for this visit.       Past Medical History:   Diagnosis Date    Uncomplicated asthma 1992    viral induced            Review of Systems   Constitutional: Negative.    Respiratory: Negative.     Cardiovascular:  Positive for chest pain (on and off left chest).   Musculoskeletal:         Left shoulder stiffness   All other systems reviewed and are negative.           Objective    /85 (BP Location: Right arm, Patient Position: Sitting, Cuff Size: Adult Large)   Pulse 69   Temp 97.4  F (36.3  C) (Oral)   Resp 22   Ht 1.753 m (5' 9\")   Wt 91.4 kg (201 lb 6.4 oz)   SpO2 99%   BMI 29.74 kg/m    Body mass index is 29.74 kg/m .  Physical Exam  Vitals " reviewed.   Constitutional:       General: She is not in acute distress.     Appearance: Normal appearance. She is not ill-appearing or toxic-appearing.   Cardiovascular:      Rate and Rhythm: Normal rate and regular rhythm.      Pulses: Normal pulses.      Heart sounds: Normal heart sounds.   Pulmonary:      Effort: Pulmonary effort is normal.      Breath sounds: Normal breath sounds.   Musculoskeletal:         General: Normal range of motion.      Comments: No pain with passive ROM, she has some stiffness feeling in the shoulder radiating to the neck with active ROM anteriorly   Skin:     General: Skin is warm and dry.   Neurological:      Mental Status: She is alert and oriented to person, place, and time.   Psychiatric:         Behavior: Behavior normal.

## 2023-11-21 NOTE — PATIENT INSTRUCTIONS
CT scan ordered. Please call [phone] to schedule your appointment.     If symptoms get worse or you develop fevers, please go to urgent care or ER.      Labs done.     If symptoms do not improve, please follow-up in 1-2 weeks.

## 2023-11-22 ENCOUNTER — ANCILLARY PROCEDURE (OUTPATIENT)
Dept: CT IMAGING | Facility: CLINIC | Age: 48
End: 2023-11-22
Attending: NURSE PRACTITIONER
Payer: COMMERCIAL

## 2023-11-22 DIAGNOSIS — R07.9 CHEST PAIN, UNSPECIFIED TYPE: ICD-10-CM

## 2023-11-22 PROCEDURE — 71275 CT ANGIOGRAPHY CHEST: CPT | Performed by: RADIOLOGY

## 2023-11-22 RX ORDER — IOPAMIDOL 755 MG/ML
67 INJECTION, SOLUTION INTRAVASCULAR ONCE
Status: COMPLETED | OUTPATIENT
Start: 2023-11-22 | End: 2023-11-22

## 2023-11-22 RX ADMIN — IOPAMIDOL 67 ML: 755 INJECTION, SOLUTION INTRAVASCULAR at 11:38

## 2023-11-22 NOTE — DISCHARGE INSTRUCTIONS

## 2023-12-01 ENCOUNTER — THERAPY VISIT (OUTPATIENT)
Dept: PHYSICAL THERAPY | Facility: CLINIC | Age: 48
End: 2023-12-01
Payer: COMMERCIAL

## 2023-12-01 DIAGNOSIS — M53.3 PAIN IN THE COCCYX: ICD-10-CM

## 2023-12-01 DIAGNOSIS — N39.46 MIXED STRESS AND URGE URINARY INCONTINENCE: Primary | ICD-10-CM

## 2023-12-01 DIAGNOSIS — M62.89 PELVIC FLOOR DYSFUNCTION: ICD-10-CM

## 2023-12-01 PROCEDURE — 97140 MANUAL THERAPY 1/> REGIONS: CPT | Mod: GP | Performed by: PHYSICAL THERAPIST

## 2023-12-01 PROCEDURE — 97530 THERAPEUTIC ACTIVITIES: CPT | Mod: GP | Performed by: PHYSICAL THERAPIST

## 2023-12-05 DIAGNOSIS — M25.512 LEFT SHOULDER PAIN: Primary | ICD-10-CM

## 2023-12-08 ENCOUNTER — TELEPHONE (OUTPATIENT)
Dept: RHEUMATOLOGY | Facility: CLINIC | Age: 48
End: 2023-12-08
Payer: COMMERCIAL

## 2023-12-10 NOTE — TELEPHONE ENCOUNTER
DIAGNOSIS: Shoulder pain/issue: possible impingement or ???. Unsure. Thought having heart issues due to pain, but ruled out by blood work and CT scans.     APPOINTMENT DATE: 12.13.23   NOTES STATUS DETAILS   DISCHARGE REPORT from the ER Internal 11.19.23  Chela  [facility]   MEDICATION LIST Imaging    XRAYS (IMAGES & REPORTS) In process *sched* for 12.13.23  XR Shoulder Left

## 2023-12-13 ENCOUNTER — PRE VISIT (OUTPATIENT)
Dept: ORTHOPEDICS | Facility: CLINIC | Age: 48
End: 2023-12-13

## 2023-12-28 ENCOUNTER — ANCILLARY PROCEDURE (OUTPATIENT)
Dept: GENERAL RADIOLOGY | Facility: CLINIC | Age: 48
End: 2023-12-28
Attending: FAMILY MEDICINE
Payer: COMMERCIAL

## 2023-12-28 ENCOUNTER — OFFICE VISIT (OUTPATIENT)
Dept: ORTHOPEDICS | Facility: CLINIC | Age: 48
End: 2023-12-28
Payer: COMMERCIAL

## 2023-12-28 DIAGNOSIS — M54.2 CERVICALGIA: Primary | ICD-10-CM

## 2023-12-28 DIAGNOSIS — M47.812 SPONDYLOSIS OF CERVICAL REGION WITHOUT MYELOPATHY OR RADICULOPATHY: ICD-10-CM

## 2023-12-28 DIAGNOSIS — M54.2 CERVICALGIA: ICD-10-CM

## 2023-12-28 DIAGNOSIS — M25.512 LEFT SHOULDER PAIN: ICD-10-CM

## 2023-12-28 PROCEDURE — 72040 X-RAY EXAM NECK SPINE 2-3 VW: CPT | Mod: GC | Performed by: RADIOLOGY

## 2023-12-28 PROCEDURE — 73030 X-RAY EXAM OF SHOULDER: CPT | Mod: LT | Performed by: RADIOLOGY

## 2023-12-28 PROCEDURE — 99213 OFFICE O/P EST LOW 20 MIN: CPT | Performed by: FAMILY MEDICINE

## 2023-12-28 RX ORDER — MULTIVIT WITH MINERALS/LUTEIN
1 TABLET ORAL DAILY
COMMUNITY

## 2023-12-28 NOTE — LETTER
"  12/28/2023      RE: Sharon Waters  [address]  [facility] [address]     Dear Colleague,    Thank you for referring your patient, Sharon Waters, to the [facility] [place]. Please see a copy of my visit note below.    Sports Medicine Clinic Visit    PCP: Derrick Senior    Sharon Waters is a 48 year old female who is seen  in consultation at the request of Dr. Joanne deras. provider found presenting with left shoulder pain    Patient notes discomfort in the area of the left posterior shoulder blade and left anterior shoulder when she tries to abduct her shoulder and position it in bed at night, and also to reach across her body.  She also notes discomfort in the left side of her neck, and a \"deep ache\" within the left shoulder.  Has been bothersome for 6 weeks.  She remembers the discomfort happening the day after she positioned herself on a reformer machine doing Pilates.  No numbness or tingling in the left upper extremity.  No radicular discomfort down the left arm.  No sharp pain with overhead reaching.    Patient indicates that in high school she used to see physical therapist for her neck.  No history of neck injury.    Injury: Notes no specific injury but that it began after doing side planks pilates     Location of Pain: left chest  Duration of Pain: 6 week(s)  Rating of Pain: 5/10  Pain is better with: Rest, disuse, aleve  Pain is worse with: sleeping, scapular depression  Additional Features: pain into her lateral neck  Treatment so far consists of: Rest, disuse, aleve  Prior History of related problems: none    There were no vitals taken for this visit.      Recently negative cardiac workup from the emergency room and primary provider including normal EKG, negative troponin, normal chest x-ray, no evidence of PE on CT scan of the chest, CT angiogram of the neck negative for carotid dissection.    She is  with 3 children.  She is a "  at the [employer], formerly [employer].     PMH:  Past Medical History:   Diagnosis Date    Uncomplicated asthma 1992    viral induced       Active problem list:  Patient Active Problem List   Diagnosis    Encounter for removal and reinsertion of intrauterine contraceptive device    Bilateral carpal tunnel syndrome    Encounter for IUD insertion    Tendinopathy of right gluteus medius    Tendinopathy of left gluteus medius    Benign fasciculation-cramp syndrome    Mixed stress and urge urinary incontinence    Pelvic floor dysfunction    Pain in the coccyx       FH:  Family History   Problem Relation Age of Onset    Hypertension Mother     Colon Cancer Maternal Grandmother     Diabetes Paternal Grandmother     Breast Cancer Paternal Grandmother         in her 70s.       SH:  Social History     Socioeconomic History    Marital status:      Spouse name: Not on file    Number of children: Not on file    Years of education: Not on file    Highest education level: Not on file   Occupational History    Not on file   Tobacco Use    Smoking status: Never     Passive exposure: Never    Smokeless tobacco: Never   Vaping Use    Vaping Use: Never used   Substance and Sexual Activity    Alcohol use: Yes     Comment: 1-2 per week    Drug use: No    Sexual activity: Yes     Partners: Male     Birth control/protection: I.U.D.   Other Topics Concern    Parent/sibling w/ CABG, MI or angioplasty before 65F 55M? No   Social History Narrative    Not on file     Social Determinants of Health     Financial Resource Strain: Low Risk  (11/21/2023)    Financial Resource Strain     Within the past 12 months, have you or your family members you live with been unable to get utilities (heat, electricity) when it was really needed?: No   Food Insecurity: Low Risk  (11/21/2023)    Food Insecurity     Within the past 12 months, did you worry that your food would run out before you got money to buy more?: No     Within the past 12 months, did the food you  bought just not last and you didn t have money to get more?: No   Transportation Needs: Low Risk  (11/21/2023)    Transportation Needs     Within the past 12 months, has lack of transportation kept you from medical appointments, getting your medicines, non-medical meetings or appointments, work, or from getting things that you need?: No   Physical Activity: Not on file   Stress: Not on file   Social Connections: Not on file   Interpersonal Safety: Low Risk  (11/21/2023)    Interpersonal Safety     Do you feel physically and emotionally safe where you currently live?: Yes     Within the past 12 months, have you been hit, slapped, kicked or otherwise physically hurt by someone?: No     Within the past 12 months, have you been humiliated or emotionally abused in other ways by your partner or ex-partner?: No   Housing Stability: Low Risk  (11/21/2023)    Housing Stability     Do you have housing? : Yes     Are you worried about losing your housing?: No       MEDS:  See EMR, reviewed  ALL:  See EMR, reviewed    REVIEW OF SYSTEMS:  CONSTITUTIONAL:NEGATIVE for fever, chills, change in weight  INTEGUMENTARY/SKIN: NEGATIVE for worrisome rashes, moles or lesions  EYES: NEGATIVE for vision changes or irritation  ENT/MOUTH: NEGATIVE for ear, mouth and throat problems  RESP:NEGATIVE for significant cough or SOB  BREAST: NEGATIVE for masses, tenderness or discharge  CV: NEGATIVE for chest pain, palpitations or peripheral edema  GI: NEGATIVE for nausea, abdominal pain, heartburn, or change in bowel habits  :NEGATIVE for frequency, dysuria, or hematuria  :NEGATIVE for frequency, dysuria, or hematuria  NEURO: NEGATIVE for weakness, dizziness or paresthesias  ENDOCRINE: NEGATIVE for temperature intolerance, skin/hair changes  HEME/ALLERGY/IMMUNE: NEGATIVE for bleeding problems  PSYCHIATRIC: NEGATIVE for changes in mood or affect      Full ROM to cervical spine to flexion.  Full ROM to extension.  Full ROM to side rotation right.   Full ROM to side rotation left.  Full ROM ear-to-shoulder right.  Full ROM ear-to-shoulder left.    Negative axial load maneuver, Negative Spurling's maneuver    No impingement signs at either shoulder.  Full strength bilaterally at deltoid, supraspinatus, infraspinatus, triceps, biceps, wrist extension, wrist flexion, including pincer grasp and digiti minimi strength.  Nontender at the AC joint, anterior cuff or biceps tendon of the left shoulder.  No scapular winging.    Sensation intact bilateral upper extremities to light touch.  Distal pulses intact.    Gentle spring testing of the cervical spine is without discomfort.  No muscular tenderness about the bilateral upper back or about the muscular occipital attachments.    Neural tension signs negative in upper extremity.      I personally viewed with the patient x-rays of the cervical spine that show mild degenerative disc changes at C5-C6 and C6-C7 with relative loss of disc space and small anterior spurring.      Assessment: Cervicalgia with mild cervical DJD    Plan: She would like to see physical therapy in [place].  She uses over-the-counter pain medicines which are helpful.  PT referral placed.  Follow-up if not improved.  If not improving MRI of the cervical spine could be considered.      Again, thank you for allowing me to participate in the care of your patient.      Sincerely,    Edgardo Morel MD

## 2023-12-28 NOTE — PROGRESS NOTES
"Sports Medicine Clinic Visit    PCP: Derrick Senior    Sharon Waters is a 48 year old female who is seen  in consultation at the request of Dr. Rubio ref. provider found presenting with left shoulder pain    Patient notes discomfort in the area of the left posterior shoulder blade and left anterior shoulder when she tries to abduct her shoulder and position it in bed at night, and also to reach across her body.  She also notes discomfort in the left side of her neck, and a \"deep ache\" within the left shoulder.  Has been bothersome for 6 weeks.  She remembers the discomfort happening the day after she positioned herself on a reformer machine doing Pilates.  No numbness or tingling in the left upper extremity.  No radicular discomfort down the left arm.  No sharp pain with overhead reaching.    Patient indicates that in high school she used to see physical therapist for her neck.  No history of neck injury.    Injury: Notes no specific injury but that it began after doing side planks pilates     Location of Pain: left chest  Duration of Pain: 6 week(s)  Rating of Pain: 5/10  Pain is better with: Rest, disuse, aleve  Pain is worse with: sleeping, scapular depression  Additional Features: pain into her lateral neck  Treatment so far consists of: Rest, disuse, aleve  Prior History of related problems: none    There were no vitals taken for this visit.      Recently negative cardiac workup from the emergency room and primary provider including normal EKG, negative troponin, normal chest x-ray, no evidence of PE on CT scan of the chest, CT angiogram of the neck negative for carotid dissection.    She is  with 3 children.  She is a  at the [employer].     PMH:  Past Medical History:   Diagnosis Date    Uncomplicated asthma 1992    viral induced       Active problem list:  Patient Active Problem List   Diagnosis    Encounter for removal and reinsertion of intrauterine contraceptive device    Bilateral " carpal tunnel syndrome    Encounter for IUD insertion    Tendinopathy of right gluteus medius    Tendinopathy of left gluteus medius    Benign fasciculation-cramp syndrome    Mixed stress and urge urinary incontinence    Pelvic floor dysfunction    Pain in the coccyx       FH:  Family History   Problem Relation Age of Onset    Hypertension Mother     Colon Cancer Maternal Grandmother     Diabetes Paternal Grandmother     Breast Cancer Paternal Grandmother         in her 70s.       SH:  Social History     Socioeconomic History    Marital status:      Spouse name: Not on file    Number of children: Not on file    Years of education: Not on file    Highest education level: Not on file   Occupational History    Not on file   Tobacco Use    Smoking status: Never     Passive exposure: Never    Smokeless tobacco: Never   Vaping Use    Vaping Use: Never used   Substance and Sexual Activity    Alcohol use: Yes     Comment: 1-2 per week    Drug use: No    Sexual activity: Yes     Partners: Male     Birth control/protection: I.U.D.   Other Topics Concern    Parent/sibling w/ CABG, MI or angioplasty before 65F 55M? No   Social History Narrative    Not on file     Social Determinants of Health     Financial Resource Strain: Low Risk  (11/21/2023)    Financial Resource Strain     Within the past 12 months, have you or your family members you live with been unable to get utilities (heat, electricity) when it was really needed?: No   Food Insecurity: Low Risk  (11/21/2023)    Food Insecurity     Within the past 12 months, did you worry that your food would run out before you got money to buy more?: No     Within the past 12 months, did the food you bought just not last and you didn t have money to get more?: No   Transportation Needs: Low Risk  (11/21/2023)    Transportation Needs     Within the past 12 months, has lack of transportation kept you from medical appointments, getting your medicines, non-medical meetings or  appointments, work, or from getting things that you need?: No   Physical Activity: Not on file   Stress: Not on file   Social Connections: Not on file   Interpersonal Safety: Low Risk  (11/21/2023)    Interpersonal Safety     Do you feel physically and emotionally safe where you currently live?: Yes     Within the past 12 months, have you been hit, slapped, kicked or otherwise physically hurt by someone?: No     Within the past 12 months, have you been humiliated or emotionally abused in other ways by your partner or ex-partner?: No   Housing Stability: Low Risk  (11/21/2023)    Housing Stability     Do you have housing? : Yes     Are you worried about losing your housing?: No       MEDS:  See EMR, reviewed  ALL:  See EMR, reviewed    REVIEW OF SYSTEMS:  CONSTITUTIONAL:NEGATIVE for fever, chills, change in weight  INTEGUMENTARY/SKIN: NEGATIVE for worrisome rashes, moles or lesions  EYES: NEGATIVE for vision changes or irritation  ENT/MOUTH: NEGATIVE for ear, mouth and throat problems  RESP:NEGATIVE for significant cough or SOB  BREAST: NEGATIVE for masses, tenderness or discharge  CV: NEGATIVE for chest pain, palpitations or peripheral edema  GI: NEGATIVE for nausea, abdominal pain, heartburn, or change in bowel habits  :NEGATIVE for frequency, dysuria, or hematuria  :NEGATIVE for frequency, dysuria, or hematuria  NEURO: NEGATIVE for weakness, dizziness or paresthesias  ENDOCRINE: NEGATIVE for temperature intolerance, skin/hair changes  HEME/ALLERGY/IMMUNE: NEGATIVE for bleeding problems  PSYCHIATRIC: NEGATIVE for changes in mood or affect      Full ROM to cervical spine to flexion.  Full ROM to extension.  Full ROM to side rotation right.  Full ROM to side rotation left.  Full ROM ear-to-shoulder right.  Full ROM ear-to-shoulder left.    Negative axial load maneuver, Negative Spurling's maneuver    No impingement signs at either shoulder.  Full strength bilaterally at deltoid, supraspinatus, infraspinatus,  triceps, biceps, wrist extension, wrist flexion, including pincer grasp and digiti minimi strength.  Nontender at the AC joint, anterior cuff or biceps tendon of the left shoulder.  No scapular winging.    Sensation intact bilateral upper extremities to light touch.  Distal pulses intact.    Gentle spring testing of the cervical spine is without discomfort.  No muscular tenderness about the bilateral upper back or about the muscular occipital attachments.    Neural tension signs negative in upper extremity.    Personally viewed the patient x-rays of the left shoulder that show no significant glenohumeral or AC joint DJD.  Good maintenance of the glenohumeral space with some small squaring of the peripheral coronaries.  Mild DJD of the AC joint.      I personally viewed with the patient x-rays of the cervical spine that show mild degenerative disc changes at C5-C6 and C6-C7 with relative loss of disc space and small anterior spurring.      Assessment: Cervicalgia with mild cervical DJD    Plan: She would like to see physical therapy in [place].  She uses over-the-counter pain medicines which are helpful.  PT referral placed.  Follow-up if not improved.  If not improving MRI of the cervical spine could be considered.                         - c/w [place]  - PT consult

## 2023-12-29 ENCOUNTER — THERAPY VISIT (OUTPATIENT)
Dept: PHYSICAL THERAPY | Facility: CLINIC | Age: 48
End: 2023-12-29
Payer: COMMERCIAL

## 2023-12-29 DIAGNOSIS — M53.3 PAIN IN THE COCCYX: ICD-10-CM

## 2023-12-29 DIAGNOSIS — N39.46 MIXED STRESS AND URGE URINARY INCONTINENCE: Primary | ICD-10-CM

## 2023-12-29 DIAGNOSIS — M62.89 PELVIC FLOOR DYSFUNCTION: ICD-10-CM

## 2023-12-29 PROCEDURE — 97530 THERAPEUTIC ACTIVITIES: CPT | Mod: GP | Performed by: PHYSICAL THERAPIST

## 2023-12-29 PROCEDURE — 97140 MANUAL THERAPY 1/> REGIONS: CPT | Mod: GP | Performed by: PHYSICAL THERAPIST

## 2023-12-29 PROCEDURE — 97110 THERAPEUTIC EXERCISES: CPT | Mod: GP | Performed by: PHYSICAL THERAPIST

## 2024-01-09 ENCOUNTER — THERAPY VISIT (OUTPATIENT)
Dept: PHYSICAL THERAPY | Facility: CLINIC | Age: 49
End: 2024-01-09
Payer: COMMERCIAL

## 2024-01-09 DIAGNOSIS — G89.29 CHRONIC PAIN OF BOTH SHOULDERS: ICD-10-CM

## 2024-01-09 DIAGNOSIS — M53.3 PAIN IN THE COCCYX: Primary | ICD-10-CM

## 2024-01-09 DIAGNOSIS — M54.2 CERVICALGIA: ICD-10-CM

## 2024-01-09 DIAGNOSIS — G54.0 TOS (THORACIC OUTLET SYNDROME): ICD-10-CM

## 2024-01-09 DIAGNOSIS — M25.511 CHRONIC PAIN OF BOTH SHOULDERS: ICD-10-CM

## 2024-01-09 DIAGNOSIS — M25.512 CHRONIC PAIN OF BOTH SHOULDERS: ICD-10-CM

## 2024-01-09 PROCEDURE — 97161 PT EVAL LOW COMPLEX 20 MIN: CPT | Mod: GP | Performed by: PHYSICAL THERAPIST

## 2024-01-09 PROCEDURE — 97140 MANUAL THERAPY 1/> REGIONS: CPT | Mod: GP | Performed by: PHYSICAL THERAPIST

## 2024-01-09 PROCEDURE — 97110 THERAPEUTIC EXERCISES: CPT | Mod: GP | Performed by: PHYSICAL THERAPIST

## 2024-01-12 ENCOUNTER — THERAPY VISIT (OUTPATIENT)
Dept: PHYSICAL THERAPY | Facility: CLINIC | Age: 49
End: 2024-01-12
Payer: COMMERCIAL

## 2024-01-12 DIAGNOSIS — M62.89 PELVIC FLOOR DYSFUNCTION: ICD-10-CM

## 2024-01-12 DIAGNOSIS — N39.46 MIXED STRESS AND URGE URINARY INCONTINENCE: Primary | ICD-10-CM

## 2024-01-12 DIAGNOSIS — M53.3 PAIN IN THE COCCYX: ICD-10-CM

## 2024-01-12 PROCEDURE — 97110 THERAPEUTIC EXERCISES: CPT | Mod: GP | Performed by: PHYSICAL THERAPIST

## 2024-01-12 PROCEDURE — 97140 MANUAL THERAPY 1/> REGIONS: CPT | Mod: GP | Performed by: PHYSICAL THERAPIST

## 2024-01-12 PROCEDURE — 97530 THERAPEUTIC ACTIVITIES: CPT | Mod: GP | Performed by: PHYSICAL THERAPIST

## 2024-01-15 ENCOUNTER — THERAPY VISIT (OUTPATIENT)
Dept: PHYSICAL THERAPY | Facility: CLINIC | Age: 49
End: 2024-01-15
Payer: COMMERCIAL

## 2024-01-15 DIAGNOSIS — M25.512 CHRONIC PAIN OF BOTH SHOULDERS: ICD-10-CM

## 2024-01-15 DIAGNOSIS — G89.29 CHRONIC PAIN OF BOTH SHOULDERS: ICD-10-CM

## 2024-01-15 DIAGNOSIS — M54.2 CERVICALGIA: Primary | ICD-10-CM

## 2024-01-15 DIAGNOSIS — M25.511 CHRONIC PAIN OF BOTH SHOULDERS: ICD-10-CM

## 2024-01-15 DIAGNOSIS — G54.0 TOS (THORACIC OUTLET SYNDROME): ICD-10-CM

## 2024-01-15 PROCEDURE — 97140 MANUAL THERAPY 1/> REGIONS: CPT | Mod: GP | Performed by: PHYSICAL THERAPIST

## 2024-01-15 PROCEDURE — 97110 THERAPEUTIC EXERCISES: CPT | Mod: GP | Performed by: PHYSICAL THERAPIST

## 2024-01-24 ENCOUNTER — THERAPY VISIT (OUTPATIENT)
Dept: PHYSICAL THERAPY | Facility: CLINIC | Age: 49
End: 2024-01-24
Payer: COMMERCIAL

## 2024-01-24 DIAGNOSIS — M25.512 CHRONIC PAIN OF BOTH SHOULDERS: ICD-10-CM

## 2024-01-24 DIAGNOSIS — G89.29 CHRONIC PAIN OF BOTH SHOULDERS: ICD-10-CM

## 2024-01-24 DIAGNOSIS — M54.2 CERVICALGIA: Primary | ICD-10-CM

## 2024-01-24 DIAGNOSIS — M25.511 CHRONIC PAIN OF BOTH SHOULDERS: ICD-10-CM

## 2024-01-24 DIAGNOSIS — G54.0 TOS (THORACIC OUTLET SYNDROME): ICD-10-CM

## 2024-01-24 PROCEDURE — 97140 MANUAL THERAPY 1/> REGIONS: CPT | Mod: GP | Performed by: PHYSICAL THERAPIST

## 2024-01-24 PROCEDURE — 97110 THERAPEUTIC EXERCISES: CPT | Mod: GP | Performed by: PHYSICAL THERAPIST

## 2024-01-30 ENCOUNTER — THERAPY VISIT (OUTPATIENT)
Dept: PHYSICAL THERAPY | Facility: CLINIC | Age: 49
End: 2024-01-30
Payer: COMMERCIAL

## 2024-01-30 DIAGNOSIS — G89.29 CHRONIC PAIN OF BOTH SHOULDERS: ICD-10-CM

## 2024-01-30 DIAGNOSIS — M25.511 CHRONIC PAIN OF BOTH SHOULDERS: ICD-10-CM

## 2024-01-30 DIAGNOSIS — M54.2 CERVICALGIA: Primary | ICD-10-CM

## 2024-01-30 DIAGNOSIS — M25.512 CHRONIC PAIN OF BOTH SHOULDERS: ICD-10-CM

## 2024-01-30 DIAGNOSIS — G54.0 TOS (THORACIC OUTLET SYNDROME): ICD-10-CM

## 2024-01-30 PROCEDURE — 97140 MANUAL THERAPY 1/> REGIONS: CPT | Mod: GP | Performed by: PHYSICAL THERAPIST

## 2024-01-30 PROCEDURE — 97110 THERAPEUTIC EXERCISES: CPT | Mod: GP | Performed by: PHYSICAL THERAPIST

## 2024-02-12 ENCOUNTER — OFFICE VISIT (OUTPATIENT)
Dept: RHEUMATOLOGY | Facility: CLINIC | Age: 49
End: 2024-02-12
Attending: STUDENT IN AN ORGANIZED HEALTH CARE EDUCATION/TRAINING PROGRAM
Payer: COMMERCIAL

## 2024-02-12 VITALS
SYSTOLIC BLOOD PRESSURE: 124 MMHG | OXYGEN SATURATION: 99 % | DIASTOLIC BLOOD PRESSURE: 84 MMHG | HEART RATE: 72 BPM | WEIGHT: 202 LBS | BODY MASS INDEX: 29.83 KG/M2

## 2024-02-12 DIAGNOSIS — M67.952 TENDINOPATHY OF LEFT GLUTEUS MEDIUS: ICD-10-CM

## 2024-02-12 PROCEDURE — 99213 OFFICE O/P EST LOW 20 MIN: CPT | Performed by: STUDENT IN AN ORGANIZED HEALTH CARE EDUCATION/TRAINING PROGRAM

## 2024-02-12 PROCEDURE — 99245 OFF/OP CONSLTJ NEW/EST HI 55: CPT | Mod: GC | Performed by: STUDENT IN AN ORGANIZED HEALTH CARE EDUCATION/TRAINING PROGRAM

## 2024-02-12 ASSESSMENT — PAIN SCALES - GENERAL: PAINLEVEL: MILD PAIN (3)

## 2024-02-12 NOTE — PATIENT INSTRUCTIONS
- Continue to follow with PT.    - Think about ways to get moving regularly with exercise that you enjoy. I would ensure that you do some activities that focus on hip mobilizing.     - Your vitamin D level could be checked in primary care. If it is less than 30, it could contribute to feelings of aches and pains. Supplementation can be prescribed.     - The positive NOEMI of 1:40 is not significant at this time. If you develop things like unintended weight loss, extreme fatigue, rashes, fevers, persistent joint swelling/pain, sores in your nose or mouth, please contact our team and we can discuss next steps. For now, no follow-up in rheumatology is needed.     - Make sure that you optimize things like sleep and mood/stress management. Issues here can lead to worsening of pain.     - Weight management will also help manage musculoskeletal pain.

## 2024-02-12 NOTE — LETTER
2/12/2024       RE: Sharon Waters  [address]  [facility] [address]     Dear Colleague,    Thank you for referring your patient, Sharon Waters, to the [facility] RHEUMATOLOGY at [facility]. Please see a copy of my visit note below.      Rheumatology New Clinic Consult Note-Fellow    Sharon Waters MRN# [id number]   Age: 48 year old YOB: 1975       Assessment & Recommendations:     ASSESSMENT:  Sharon Waters is a 48 year old female with a history of benign cramp fasciculation syndrome who presents for rheumatology evaluation in the setting of body pain and a borderline positive NOEMI of 1:40. Based on complete medical history, rheumatologic review of systems, and full physical examination there are no signs of rheumatic disease present at this time. No concerning rashes or signs of synovitis were noted. No clinical features concerning for myositis. She additionally had a normal EMG in 1/2023. Her reassuring history and physical exam is further supported by her otherwise normal laboratory evaluations including normal inflammatory markers, CBC with differential, and chemistry as well as multiple prior imaging studies showing degenerative changes but no sequelae concerning for inflammatory processes.    The constellation of symptoms as well as the pattern and location of pain is not consistent with an inflammatory disease. Although Sharon has hyperextension at the knees and some increased ankle mobility, no other signs of hypermobility were noted on history or examination. In fact her hips were tighter than may be expected as compared to the mobility in her other joints. Per Sharon, she does have an old tailbone injury that her physical therapist is concerned could be still impacting/causing her lower extremity pain. I encouraged Sharon to continue to work with physical therapy to improve mobility in her hips. I  "also encouraged her to establish a regular physical activity regimen. Her physical therapist may be able to help her find appropriate activities and generate a workout plan. Her left sided chest pain may be due to thoracic outlet syndrome per her physical therapist. A full cardiac workup was done in 11/2023 including labs, EKG, and CTA neck that were all reassuring. She also had a normal echocardiogram in 4/2023. Again I did not appreciate any findings of inflammatory disease such as costochondritis or glenohumeral synovitis on physical exam today.     At this time, further workup is not needed from a rheumatologic perspective. No medications are recommended at this time. I did discuss with Sharon that symptoms can change and progress over time, so if she starts to develop persistent joint pain, swelling, rashes, fevers, or other symptoms without a clear explanation that I would like to be informed. At that time, we could discuss if an updated rheumatology assessment or additional lab testing may be needed. Family expressed understanding and is in agreement with the plan of care.     PROBLEM LIST:  Myofascial Pain  Borderline Positive NOEMI  Benign Cramp Fasciculation Syndrome    RECOMMENDATIONS:  - Continue physical therapy  - Follow-up with neurology as recommended  - Optimize other pillars of health including sleep, mood/stress management, and physical activity    The patient was seen and discussed with Dr. Simmons who agrees with above assessment and plan.     Alison M. Lerman, MD  Adult and Pediatric Rheumatology Fellow    HPI     Sharon Waters is a 48 year old female with a history of benign cramp fasciculation syndrome who presents for rheumatology evaluation in the setting of body pain and a borderline positive NOEMI of 1:40.     Sharon has had general discomfort from the waist down for many years. She describes her issues as tight muscles and a deep aching pain \"in her bones.\" The pain is improved " "with muscle manipulation and pressure/squeezing. Rest is also helpful. Standing a lot makes her pain worse. Other times there does not seem to be rhyme or reason to why her legs hurt more, but she will tend to have some extra bad days here and there. She feels \"creaky\" when she wakes up, but the evening is when she feels the most pain. The pain is generally better in the morning. The joints have not painful for stiff and they have never appeared swollen. She did develop symptoms of bursitis (all of a sudden could not lay on her side when she slept, no clear trigger) over the summer of 2023. She had gained some weight, but there was no other clear trigger for this. This issue has been improving quite a bit with PT.     She did have an intentional weight loss of 30 pounds in 2021 while doing weight watchers. During this time she also lost a lot of hair (diffusely). She has gained back this weight over the past two years. Her hair has now grown back. She had a normal thyroid level in 3/2023.      When she lost weight in 2021, the leg cramp issue started. She feels muscle twitching 24/7. This has not changed since the time it started in 2021. She was seen by a neurologist a year ago and had a large workup including labs, imaging (MRI brain, MRI spine), and an EMG. All her testing was normal/reassuring. An NOEMI was done as a part of this workup and was 1:40. The neurologist diagnosed her with a process called benign cramp fasciculation syndrome.     In 7/2023 she had hip pain, and an x-ray showed degenerative changes in the SI joints and other pelvic regions with osteophytosis and subcortical cystic changes. Of note in 11/2021 a lumbar spine MRI showed some degenerative changes with facet arthopathy at the L5/S1 level. In 12/2023 plain films showed some cervical spine degenerative changes as well as left mild glenohumeral osteoarthrosis.    In 11/2023, she had sudden onset of left upper chest pain. She presented to the ED " where an acute cardiac etiology was ruled out with labs and EKG. A d-dimer was slightly positive at 0.99. A CTA neck was normal. Her d-dimer normalized two days later. She had an overall normal CT chest (PE protocol). Her physical therapy providers feel she may have thoracic outlet syndrome. She still has pain in her left upper chest but it is better.     Patient denies any fevers, chills, weight loss, vision changes, headaches, focal weakness or numbness. Denies any arthralgias, morning stiffness, Raynaud's, rash, photosensitivity, nasal or oral ulcers, ear fullness or drainage. No cough or dyspnea, no hematuria, no history of blood clots.       HISTORY REVIEW:  Past Medical History:   Diagnosis Date    Uncomplicated asthma 1992    viral induced     Past Surgical History:   Procedure Laterality Date    COLONOSCOPY N/A 3/5/2021    Procedure: COLONOSCOPY, WITH POLYPECTOMY;  Surgeon: Jesse Asencio MD;  Location: [facility] OR    NO HISTORY OF SURGERY       Family History   Problem Relation Age of Onset    Hypertension Mother     Colon Cancer Maternal Grandmother     Diabetes Paternal Grandmother     Breast Cancer Paternal Grandmother         in her 70s.     Social History     Socioeconomic History    Marital status:      Spouse name: Not on file    Number of children: Not on file    Years of education: Not on file    Highest education level: Not on file   Occupational History    Not on file   Tobacco Use    Smoking status: Never     Passive exposure: Never    Smokeless tobacco: Never   Vaping Use    Vaping Use: Never used   Substance and Sexual Activity    Alcohol use: Yes     Comment: 1-2 per week    Drug use: No    Sexual activity: Yes     Partners: Male     Birth control/protection: I.U.D.   Other Topics Concern    Parent/sibling w/ CABG, MI or angioplasty before 65F 55M? No   Social History Narrative    Not on file     Social Determinants of Health     Financial Resource Strain: Low Risk  (11/21/2023)     Financial Resource Strain     Within the past 12 months, have you or your family members you live with been unable to get utilities (heat, electricity) when it was really needed?: No   Food Insecurity: Low Risk  (11/21/2023)    Food Insecurity     Within the past 12 months, did you worry that your food would run out before you got money to buy more?: No     Within the past 12 months, did the food you bought just not last and you didn t have money to get more?: No   Transportation Needs: Low Risk  (11/21/2023)    Transportation Needs     Within the past 12 months, has lack of transportation kept you from medical appointments, getting your medicines, non-medical meetings or appointments, work, or from getting things that you need?: No   Physical Activity: Not on file   Stress: Not on file   Social Connections: Not on file   Interpersonal Safety: Low Risk  (11/21/2023)    Interpersonal Safety     Do you feel physically and emotionally safe where you currently live?: Yes     Within the past 12 months, have you been hit, slapped, kicked or otherwise physically hurt by someone?: No     Within the past 12 months, have you been humiliated or emotionally abused in other ways by your partner or ex-partner?: No   Housing Stability: Low Risk  (11/21/2023)    Housing Stability     Do you have housing? : Yes     Are you worried about losing your housing?: No     Patient Active Problem List   Diagnosis    Encounter for removal and reinsertion of intrauterine contraceptive device    Bilateral carpal tunnel syndrome    Encounter for IUD insertion    Tendinopathy of right gluteus medius    Tendinopathy of left gluteus medius    Benign fasciculation-cramp syndrome    Mixed stress and urge urinary incontinence    Pelvic floor dysfunction    Pain in the coccyx    Cervicalgia    TOS (thoracic outlet syndrome)    Chronic pain of both shoulders     Allergies   Allergen Reactions    Seasonal Allergies        ROS     A 10 point ROS was  performed with pertinent findings listed above.    Objective     PHYSICAL EXAM  There were no vitals taken for this visit.  Wt Readings from Last 4 Encounters:   11/21/23 91.4 kg (201 lb 6.4 oz)   11/19/23 89.8 kg (198 lb)   03/20/23 88.9 kg (196 lb)   09/08/22 86.2 kg (190 lb)       Constitutional: Alert, NAD.  Eyes: EOMI, PERRL, no conjunctival injection or icterus.  ENT: Normal external ears, nose, lips, teeth, gums, throat; no mucous membrane lesions, normal saliva pool.  Neck: No mass or thyroid enlargement.  Resp: Lungs clear to auscultation bilaterally.  CV: RRR, no murmurs, rubs or gallops.  GI: Non-tender, non-distended.  : Deferred  Lymph: No cervical, supraclavicular, or epitrochlear nodes  MS: All TMJ, neck, shoulder, elbow, wrist, MCP/PIP/DIP, spine, hip, knee, ankle, and foot MTP/IP joints were examined and found normal with the following exceptions:   - Tightness to flexion of bilateral hips though ROM overall normal.  - Notable knee hyperextension bilaterally.  - Some tenderness to palpation of lateral left foot, not over MCP or tarsal joint space. Bunions noted bilaterally, R>L.   No active synovitis or deformity. Full ROM. Normal  strength.  Skin: No nail pitting, alopecia, rash, nodules or other lesions.  Neuro: CN II-XII grossly intact. Upper and lower extremity strength 5/5. Sensation and tone grossly normal.   Extr: No edema, PP+2.  Psych: Normal judgement, orientation, memory, affect.    DATA:    CBC:  Recent Labs   Lab Test 11/19/23  1814 03/20/23  0738 01/09/23  1337   WBC 6.7 6.4 6.7   RBC 4.33 4.44 4.39   HGB 13.5 13.8 13.5   HCT 39.2 40.5 39.0   MCV 91 91 89   MCH 31.2 31.1 30.8   MCHC 34.4 34.1 34.6   RDW 11.7 12.4 12.0    184 214       BMP:  Recent Labs   Lab Test 11/19/23  1814 04/24/23  1531 01/09/23  1337    140 140   POTASSIUM 4.3 4.1 3.9   CHLORIDE 104 106 103   CO2 28 30 27   ANIONGAP 6* 4 10   GLC 83 90 89   BUN 18.5 16 14.8   CR 1.00* 1.01 0.84   GFRESTIMATED  69 68 85   CAROLINE 8.9 8.8 9.4       LFT:  Recent Labs   Lab Test 11/19/23  1814 01/09/23  1337 11/07/21  1155   PROTTOTAL 7.0 6.9 7.3   ALBUMIN 4.7 4.5 4.0   BILITOTAL 0.3 0.5 0.7   ALKPHOS 62 59 52   AST 16 16 13   ALT 16 17 24       TSH   Date Value Ref Range Status   03/20/2023 2.79 0.30 - 4.20 uIU/mL Final   01/11/2021 3.70 0.40 - 4.00 mU/L Final       Inflammatory markers  Lab Results   Component Value Date    SED 5 01/09/2023     Ferritin   Date Value Ref Range Status   01/09/2023 210 (H) 6 - 175 ng/mL Final       UA RESULTS:  Recent Labs   Lab Test 11/07/21  1155   COLOR Straw   APPEARANCE Clear   URINEGLC Negative   URINEBILI Negative   URINEKETONE Negative   SG 1.010   UBLD Trace*   URINEPH 5.5   PROTEIN Negative   NITRITE Negative   LEUKEST Negative   RBCU <1   WBCU 1       IMAGING:    3 views pelvis radiograph(s) 7/15/2023 10:56 AM     History: Bilateral AP/FROG LAT; Hip pain     Additional History from EMR: Bilateral hip pain for 6-9 months, pain  is worse with standing sitting and sleep pain. History of tailbone  injury in 2001.     Comparison: MR lumbar spine without contrast dated 11/22/2021, Pelvic  ultrasound dated 11/7/2021     Findings:     AP, frog left and right view(s) of the pelvis were obtained.      No acute osseous abnormality, no evidence of fracture or dislocation.  Enthesophytes visualized the iliac crests bilaterally. High-grade  narrowing of the left sacroiliac joint, moderately narrowing of the  right sacroiliac joint. Osteophytosis and subchondral sclerosis  visualized hip joints bilaterally with preservation of joint space.  Subcortical cystic changes visualized at the greater trochanter  bilaterally. Osteophytosis of the pubic symphysis.     Mild degenerative changes visualized L5-S1.     IUD projects over the central pelvis.     Sacrum and innominate bones are partially obscured by overlying bowel  gas/fecal content.     Pelvic phlebolith.                                                                       Impression:     1. Bilateral SI joint degenerative change, left worse than right.     2. No substantial degenerative change of the hips.      I have personally reviewed the examination and initial interpretation  and I agree with the findings.     SUNDAR COLON MD (Joe)     Attestation signed by Richi Simmons MD at 2/20/2024  3:39 PM:  Attending Attestation     Patient seen and discussed with Dr. Lerman. I confirmed key aspects of history and physical examination and personally reviewed the vital signs, medications, labs, and imaging. I was directly involved in medical decision making and management of this patient, and I agree with the documentation, findings, and plan.    Richi Simmons MD  Staff Rheumatologist, [facility]   Division of Rheumatic and Autoimmune diseases   Pager - [phone]

## 2024-02-12 NOTE — PROGRESS NOTES
Rheumatology New Clinic Consult Note-Fellow    Sharon Waters MRN# [id number]   Age: 48 year old YOB: 1975       Assessment & Recommendations:     ASSESSMENT:  Sharon Waters is a 48 year old female with a history of benign cramp fasciculation syndrome who presents for rheumatology evaluation in the setting of body pain and a borderline positive NOEMI of 1:40. Based on complete medical history, rheumatologic review of systems, and full physical examination there are no signs of rheumatic disease present at this time. No concerning rashes or signs of synovitis were noted. No clinical features concerning for myositis. She additionally had a normal EMG in 1/2023. Her reassuring history and physical exam is further supported by her otherwise normal laboratory evaluations including normal inflammatory markers, CBC with differential, and chemistry as well as multiple prior imaging studies showing degenerative changes but no sequelae concerning for inflammatory processes.    The constellation of symptoms as well as the pattern and location of pain is not consistent with an inflammatory disease. Although Sharon has hyperextension at the knees and some increased ankle mobility, no other signs of hypermobility were noted on history or examination. In fact her hips were tighter than may be expected as compared to the mobility in her other joints. Per Sharon, she does have an old tailbone injury that her physical therapist is concerned could be still impacting/causing her lower extremity pain. I encouraged Sharon to continue to work with physical therapy to improve mobility in her hips. I also encouraged her to establish a regular physical activity regimen. Her physical therapist may be able to help her find appropriate activities and generate a workout plan. Her left sided chest pain may be due to thoracic outlet syndrome per her physical therapist. A full cardiac workup was done in 11/2023 including  "labs, EKG, and CTA neck that were all reassuring. She also had a normal echocardiogram in 4/2023. Again I did not appreciate any findings of inflammatory disease such as costochondritis or glenohumeral synovitis on physical exam today.     At this time, further workup is not needed from a rheumatologic perspective. No medications are recommended at this time. I did discuss with Sharon that symptoms can change and progress over time, so if she starts to develop persistent joint pain, swelling, rashes, fevers, or other symptoms without a clear explanation that I would like to be informed. At that time, we could discuss if an updated rheumatology assessment or additional lab testing may be needed. Family expressed understanding and is in agreement with the plan of care.     PROBLEM LIST:  Myofascial Pain  Borderline Positive NOEMI  Benign Cramp Fasciculation Syndrome    RECOMMENDATIONS:  - Continue physical therapy  - Follow-up with neurology as recommended  - Optimize other pillars of health including sleep, mood/stress management, and physical activity    The patient was seen and discussed with Dr. Simmons who agrees with above assessment and plan.     Alison M. Lerman, MD  Adult and Pediatric Rheumatology Fellow    HPI     Sharon Waters is a 48 year old female with a history of benign cramp fasciculation syndrome who presents for rheumatology evaluation in the setting of body pain and a borderline positive NOEMI of 1:40.     Sharon has had general discomfort from the waist down for many years. She describes her issues as tight muscles and a deep aching pain \"in her bones.\" The pain is improved with muscle manipulation and pressure/squeezing. Rest is also helpful. Standing a lot makes her pain worse. Other times there does not seem to be rhyme or reason to why her legs hurt more, but she will tend to have some extra bad days here and there. She feels \"creaky\" when she wakes up, but the evening is when she feels " the most pain. The pain is generally better in the morning. The joints have not painful for stiff and they have never appeared swollen. She did develop symptoms of bursitis (all of a sudden could not lay on her side when she slept, no clear trigger) over the summer of 2023. She had gained some weight, but there was no other clear trigger for this. This issue has been improving quite a bit with PT.     She did have an intentional weight loss of 30 pounds in 2021 while doing weight watchers. During this time she also lost a lot of hair (diffusely). She has gained back this weight over the past two years. Her hair has now grown back. She had a normal thyroid level in 3/2023.      When she lost weight in 2021, the leg cramp issue started. She feels muscle twitching 24/7. This has not changed since the time it started in 2021. She was seen by a neurologist a year ago and had a large workup including labs, imaging (MRI brain, MRI spine), and an EMG. All her testing was normal/reassuring. An NOEMI was done as a part of this workup and was 1:40. The neurologist diagnosed her with a process called benign cramp fasciculation syndrome.     In 7/2023 she had hip pain, and an x-ray showed degenerative changes in the SI joints and other pelvic regions with osteophytosis and subcortical cystic changes. Of note in 11/2021 a lumbar spine MRI showed some degenerative changes with facet arthopathy at the L5/S1 level. In 12/2023 plain films showed some cervical spine degenerative changes as well as left mild glenohumeral osteoarthrosis.    In 11/2023, she had sudden onset of left upper chest pain. She presented to the ED where an acute cardiac etiology was ruled out with labs and EKG. A d-dimer was slightly positive at 0.99. A CTA neck was normal. Her d-dimer normalized two days later. She had an overall normal CT chest (PE protocol). Her physical therapy providers feel she may have thoracic outlet syndrome. She still has pain in her  left upper chest but it is better.     Patient denies any fevers, chills, weight loss, vision changes, headaches, focal weakness or numbness. Denies any arthralgias, morning stiffness, Raynaud's, rash, photosensitivity, nasal or oral ulcers, ear fullness or drainage. No cough or dyspnea, no hematuria, no history of blood clots.       HISTORY REVIEW:  Past Medical History:   Diagnosis Date    Uncomplicated asthma 1992    viral induced     Past Surgical History:   Procedure Laterality Date    COLONOSCOPY N/A 3/5/2021    Procedure: COLONOSCOPY, WITH POLYPECTOMY;  Surgeon: Jesse Asencio MD;  Location: [facility] OR    NO HISTORY OF SURGERY       Family History   Problem Relation Age of Onset    Hypertension Mother     Colon Cancer Maternal Grandmother     Diabetes Paternal Grandmother     Breast Cancer Paternal Grandmother         in her 70s.     Social History     Socioeconomic History    Marital status:      Spouse name: Not on file    Number of children: Not on file    Years of education: Not on file    Highest education level: Not on file   Occupational History    Not on file   Tobacco Use    Smoking status: Never     Passive exposure: Never    Smokeless tobacco: Never   Vaping Use    Vaping Use: Never used   Substance and Sexual Activity    Alcohol use: Yes     Comment: 1-2 per week    Drug use: No    Sexual activity: Yes     Partners: Male     Birth control/protection: I.U.D.   Other Topics Concern    Parent/sibling w/ CABG, MI or angioplasty before 65F 55M? No   Social History Narrative    Not on file     Social Determinants of Health     Financial Resource Strain: Low Risk  (11/21/2023)    Financial Resource Strain     Within the past 12 months, have you or your family members you live with been unable to get utilities (heat, electricity) when it was really needed?: No   Food Insecurity: Low Risk  (11/21/2023)    Food Insecurity     Within the past 12 months, did you worry that your food would run out  before you got money to buy more?: No     Within the past 12 months, did the food you bought just not last and you didn t have money to get more?: No   Transportation Needs: Low Risk  (11/21/2023)    Transportation Needs     Within the past 12 months, has lack of transportation kept you from medical appointments, getting your medicines, non-medical meetings or appointments, work, or from getting things that you need?: No   Physical Activity: Not on file   Stress: Not on file   Social Connections: Not on file   Interpersonal Safety: Low Risk  (11/21/2023)    Interpersonal Safety     Do you feel physically and emotionally safe where you currently live?: Yes     Within the past 12 months, have you been hit, slapped, kicked or otherwise physically hurt by someone?: No     Within the past 12 months, have you been humiliated or emotionally abused in other ways by your partner or ex-partner?: No   Housing Stability: Low Risk  (11/21/2023)    Housing Stability     Do you have housing? : Yes     Are you worried about losing your housing?: No     Patient Active Problem List   Diagnosis    Encounter for removal and reinsertion of intrauterine contraceptive device    Bilateral carpal tunnel syndrome    Encounter for IUD insertion    Tendinopathy of right gluteus medius    Tendinopathy of left gluteus medius    Benign fasciculation-cramp syndrome    Mixed stress and urge urinary incontinence    Pelvic floor dysfunction    Pain in the coccyx    Cervicalgia    TOS (thoracic outlet syndrome)    Chronic pain of both shoulders     Allergies   Allergen Reactions    Seasonal Allergies        ROS     A 10 point ROS was performed with pertinent findings listed above.    Objective     PHYSICAL EXAM  There were no vitals taken for this visit.  Wt Readings from Last 4 Encounters:   11/21/23 91.4 kg (201 lb 6.4 oz)   11/19/23 89.8 kg (198 lb)   03/20/23 88.9 kg (196 lb)   09/08/22 86.2 kg (190 lb)       Constitutional: Alert, NAD.  Eyes:  EOMI, PERRL, no conjunctival injection or icterus.  ENT: Normal external ears, nose, lips, teeth, gums, throat; no mucous membrane lesions, normal saliva pool.  Neck: No mass or thyroid enlargement.  Resp: Lungs clear to auscultation bilaterally.  CV: RRR, no murmurs, rubs or gallops.  GI: Non-tender, non-distended.  : Deferred  Lymph: No cervical, supraclavicular, or epitrochlear nodes  MS: All TMJ, neck, shoulder, elbow, wrist, MCP/PIP/DIP, spine, hip, knee, ankle, and foot MTP/IP joints were examined and found normal with the following exceptions:   - Tightness to flexion of bilateral hips though ROM overall normal.  - Notable knee hyperextension bilaterally.  - Some tenderness to palpation of lateral left foot, not over MCP or tarsal joint space. Bunions noted bilaterally, R>L.   No active synovitis or deformity. Full ROM. Normal  strength.  Skin: No nail pitting, alopecia, rash, nodules or other lesions.  Neuro: CN II-XII grossly intact. Upper and lower extremity strength 5/5. Sensation and tone grossly normal.   Extr: No edema, PP+2.  Psych: Normal judgement, orientation, memory, affect.    DATA:    CBC:  Recent Labs   Lab Test 11/19/23 1814 03/20/23  0738 01/09/23  1337   WBC 6.7 6.4 6.7   RBC 4.33 4.44 4.39   HGB 13.5 13.8 13.5   HCT 39.2 40.5 39.0   MCV 91 91 89   MCH 31.2 31.1 30.8   MCHC 34.4 34.1 34.6   RDW 11.7 12.4 12.0    184 214       BMP:  Recent Labs   Lab Test 11/19/23 1814 04/24/23  1531 01/09/23  1337    140 140   POTASSIUM 4.3 4.1 3.9   CHLORIDE 104 106 103   CO2 28 30 27   ANIONGAP 6* 4 10   GLC 83 90 89   BUN 18.5 16 14.8   CR 1.00* 1.01 0.84   GFRESTIMATED 69 68 85   CAROLINE 8.9 8.8 9.4       LFT:  Recent Labs   Lab Test 11/19/23 1814 01/09/23  1337 11/07/21  1155   PROTTOTAL 7.0 6.9 7.3   ALBUMIN 4.7 4.5 4.0   BILITOTAL 0.3 0.5 0.7   ALKPHOS 62 59 52   AST 16 16 13   ALT 16 17 24       TSH   Date Value Ref Range Status   03/20/2023 2.79 0.30 - 4.20 uIU/mL Final    01/11/2021 3.70 0.40 - 4.00 mU/L Final       Inflammatory markers  Lab Results   Component Value Date    SED 5 01/09/2023     Ferritin   Date Value Ref Range Status   01/09/2023 210 (H) 6 - 175 ng/mL Final       UA RESULTS:  Recent Labs   Lab Test 11/07/21  1155   COLOR Straw   APPEARANCE Clear   URINEGLC Negative   URINEBILI Negative   URINEKETONE Negative   SG 1.010   UBLD Trace*   URINEPH 5.5   PROTEIN Negative   NITRITE Negative   LEUKEST Negative   RBCU <1   WBCU 1       IMAGING:    3 views pelvis radiograph(s) 7/15/2023 10:56 AM     History: Bilateral AP/FROG LAT; Hip pain     Additional History from EMR: Bilateral hip pain for 6-9 months, pain  is worse with standing sitting and sleep pain. History of tailbone  injury in 2001.     Comparison: MR lumbar spine without contrast dated 11/22/2021, Pelvic  ultrasound dated 11/7/2021     Findings:     AP, frog left and right view(s) of the pelvis were obtained.      No acute osseous abnormality, no evidence of fracture or dislocation.  Enthesophytes visualized the iliac crests bilaterally. High-grade  narrowing of the left sacroiliac joint, moderately narrowing of the  right sacroiliac joint. Osteophytosis and subchondral sclerosis  visualized hip joints bilaterally with preservation of joint space.  Subcortical cystic changes visualized at the greater trochanter  bilaterally. Osteophytosis of the pubic symphysis.     Mild degenerative changes visualized L5-S1.     IUD projects over the central pelvis.     Sacrum and innominate bones are partially obscured by overlying bowel  gas/fecal content.     Pelvic phlebolith.                                                                      Impression:     1. Bilateral SI joint degenerative change, left worse than right.     2. No substantial degenerative change of the hips.      I have personally reviewed the examination and initial interpretation  and I agree with the findings.     SUNDAR COLON MD (Joe)

## 2024-02-12 NOTE — NURSING NOTE
Chief Complaint   Patient presents with    Consult     /84 (BP Location: Right arm, Patient Position: Sitting, Cuff Size: Adult Large)   Pulse 72   Wt 91.6 kg (202 lb)   SpO2 99%   BMI 29.83 kg/m

## 2024-02-13 ENCOUNTER — THERAPY VISIT (OUTPATIENT)
Dept: PHYSICAL THERAPY | Facility: CLINIC | Age: 49
End: 2024-02-13
Payer: COMMERCIAL

## 2024-02-13 DIAGNOSIS — M54.2 CERVICALGIA: Primary | ICD-10-CM

## 2024-02-13 DIAGNOSIS — M25.512 CHRONIC PAIN OF BOTH SHOULDERS: ICD-10-CM

## 2024-02-13 DIAGNOSIS — G89.29 CHRONIC PAIN OF BOTH SHOULDERS: ICD-10-CM

## 2024-02-13 DIAGNOSIS — M25.511 CHRONIC PAIN OF BOTH SHOULDERS: ICD-10-CM

## 2024-02-13 DIAGNOSIS — G54.0 TOS (THORACIC OUTLET SYNDROME): ICD-10-CM

## 2024-02-13 PROCEDURE — 97140 MANUAL THERAPY 1/> REGIONS: CPT | Mod: GP | Performed by: PHYSICAL THERAPIST

## 2024-02-13 PROCEDURE — 97110 THERAPEUTIC EXERCISES: CPT | Mod: GP | Performed by: PHYSICAL THERAPIST

## 2024-02-13 PROCEDURE — 97112 NEUROMUSCULAR REEDUCATION: CPT | Mod: GP | Performed by: PHYSICAL THERAPIST

## 2024-02-19 ENCOUNTER — PATIENT OUTREACH (OUTPATIENT)
Dept: CARE COORDINATION | Facility: CLINIC | Age: 49
End: 2024-02-19
Payer: COMMERCIAL

## 2024-02-27 ENCOUNTER — THERAPY VISIT (OUTPATIENT)
Dept: PHYSICAL THERAPY | Facility: CLINIC | Age: 49
End: 2024-02-27
Payer: COMMERCIAL

## 2024-02-27 DIAGNOSIS — N39.46 MIXED STRESS AND URGE URINARY INCONTINENCE: Primary | ICD-10-CM

## 2024-02-27 DIAGNOSIS — M53.3 PAIN IN THE COCCYX: ICD-10-CM

## 2024-02-27 DIAGNOSIS — M62.89 PELVIC FLOOR DYSFUNCTION: ICD-10-CM

## 2024-02-27 PROCEDURE — 97110 THERAPEUTIC EXERCISES: CPT | Mod: GP | Performed by: PHYSICAL THERAPIST

## 2024-02-27 PROCEDURE — 97140 MANUAL THERAPY 1/> REGIONS: CPT | Mod: GP | Performed by: PHYSICAL THERAPIST

## 2024-02-27 PROCEDURE — 97112 NEUROMUSCULAR REEDUCATION: CPT | Mod: GP | Performed by: PHYSICAL THERAPIST

## 2024-02-28 ENCOUNTER — NURSE TRIAGE (OUTPATIENT)
Dept: FAMILY MEDICINE | Facility: CLINIC | Age: 49
End: 2024-02-28
Payer: COMMERCIAL

## 2024-02-28 ENCOUNTER — MYC MEDICAL ADVICE (OUTPATIENT)
Dept: FAMILY MEDICINE | Facility: CLINIC | Age: 49
End: 2024-02-28
Payer: COMMERCIAL

## 2024-02-28 DIAGNOSIS — U07.1 INFECTION DUE TO 2019 NOVEL CORONAVIRUS: Primary | ICD-10-CM

## 2024-02-28 NOTE — TELEPHONE ENCOUNTER
RN COVID TREATMENT VISIT  02/28/24      The patient has been triaged and does not require a higher level of care.    Sharon Waters  48 year old  Current weight? 202 lbs    Has the patient been seen by a primary care provider at an [facility] or [facility] within the past two years? Yes.   Have you been in close proximity to/do you have a known exposure to a person with a confirmed case of influenza? No.     General treatment eligibility:  Date of positive COVID test (PCR or at home)?  2-    Are you or have you been hospitalized for this COVID-19 infection? No.   Have you received monoclonal antibodies or antiviral treatment for COVID-19 since this positive test? No.   Do you have any of the following conditions that place you at risk of being very sick from COVID-19?   - Overweight or Obesity (BMI >85th percentile or BMI 25 or higher)  Yes, patient has at least one high risk condition as noted above.     Current COVID symptoms:   - cough  - fatigue  - congestion or runny nose  Yes. Patient has at least one symptom as selected.     How many days since symptoms started? 5 days or less. Established patient, 12 years or older weighing at least 88.2 lbs, who has symptoms that started in the past 5 days, has not been hospitalized nor received treatment already, and is at risk for being very sick from COVID-19.     Treatment eligibility by RN:  Are you currently pregnant or nursing? No  Do you have a clinically significant hypersensitivity to nirmatrelvir or ritonavir, or toxic epidermal necrolysis (TEN) or Beltran-Juan Syndrome? No  Do you have a history of hepatitis, any hepatic impairment on the Problem List (such as Child-Arrington Class C, cirrhosis, fatty liver disease, alcoholic liver disease), or was the last liver lab (hepatic panel, ALT, AST, ALK Phos, bilirubin) elevated in the past 6 months? No  Do you have any history of severe renal impairment (eGFR < 30mL/min)? No    Is  patient eligible to continue? Yes, patient meets all eligibility requirements for the RN COVID treatment (as denoted by all no responses above).     Current Outpatient Medications   Medication Sig Dispense Refill    cetirizine (ZYRTEC) 5 MG tablet Take 5 mg by mouth daily      Cholecalciferol (VITAMIN D3 PO) Take 5,000 Units by mouth daily (Patient not taking: Reported on 11/21/2023)      fluticasone (FLONASE) 50 MCG/ACT nasal spray Spray 1 spray into both nostrils daily Uses seasonally (Patient not taking: Reported on 11/21/2023)      ketoconazole (NIZORAL) 2 % external cream Apply 1 Application topically See Admin Instructions (Patient not taking: Reported on 11/21/2023)      levonorgestrel (MIRENA) 20 MCG/DAY IUD 1 each (20 mcg) by Intrauterine route once      multivitamin (CENTRUM SILVER) tablet Take 1 tablet by mouth daily      rizatriptan (MAXALT) 10 MG tablet Take 1 tablet (10 mg) by mouth at onset of headache for migraine (repeat in 2 hours if needed) May repeat in 2 hours. Max 3 tablets/24 hours. 18 tablet 3       Medications from List 1 of the standing order (on medications that exclude the use of Paxlovid) that patient is taking: NONE. Is patient taking Jessica's Wort? No  Is patient taking [place]'s Wort or any meds from List 1? No.   Medications from List 2 of the standing order (on meds that provider needs to adjust) that patient is taking: NONE. Is patient on any of the meds from List 2? No.   Medications from List 3 of standing order (on meds that a RN needs to adjust) that patient is taking: NONE. Is patient on any meds from List 3? No.     Paxlovid has an approximate 90% reduction in hospitalization. Paxlovid can possibly cause altered sense of taste, diarrhea (loose, watery stools), high blood pressure, muscle aches.     Would patient like a Paxlovid prescription?   Yes.   Lab Results   Component Value Date    GFRESTIMATED 69 11/19/2023       Was last eGFR reduced? No, eGFR 60 or greater/ No  Result on record. Patient can receive the normal renal function dose. Paxlovid Rx sent to [place] [facility]   [facility] [phone]  [address], [address]  [place] [address]    Hours:  Mon-Fri: 7:00a - 7:00p    Drive-thru services available.    Temporary change to home medications: None    All medication adjustments (holds, etc) were discussed with the patient and patient was asked to repeat back (teachback) their med adjustment.  Did patient understand med adjustment? No medication adjustments needed.         Reviewed the following instructions with the patient:    Paxlovid (nimatrelvir and ritonavir)    How it works  Two medicines (nirmatrelvir and ritonavir) are taken together. They stop the virus from growing. Less amount of virus is easier for your body to fight.    How to take  Medicine comes in a daily container with both medicine tablets. Take by mouth twice daily (once in the morning, once at night) for 5 days.  The number of tablets to take varies by patient.  Don't chew or break capsules. Swallow whole.    When to take  Take as soon as possible after positive COVID-19 test result, and within 5 days of your first symptoms.    Possible side effects  Can cause altered sense of taste, diarrhea (loose, watery stools), high blood pressure, muscle aches.    Joelle York RN       Additional Information   Negative: SEVERE difficulty breathing (e.g., struggling for each breath, speaks in single words)   Negative: Difficult to awaken or acting confused (e.g., disoriented, slurred speech)   Negative: Bluish (or gray) lips or face now   Negative: Shock suspected (e.g., cold/pale/clammy skin, too weak to stand, low BP, rapid pulse)   Negative: Sounds like a life-threatening emergency to the triager   Negative: SEVERE or constant chest pain or pressure  (Exception: Mild central chest pain, present only when coughing.)   Negative: MODERATE difficulty breathing (e.g., speaks in phrases, SOB  even at rest, pulse 100-120)   Negative: Headache and stiff neck (can't touch chin to chest)   Negative: Oxygen level (e.g., pulse oximetry) 90% or lower   Negative: Chest pain or pressure  (Exception: MILD central chest pain, present only when coughing.)   Negative: Drinking very little and dehydration suspected (e.g., no urine > 12 hours, very dry mouth, very lightheaded)   Negative: Patient sounds very sick or weak to the triager    Protocols used: Coronavirus (COVID-19) Diagnosed or [id number]

## 2024-02-28 NOTE — TELEPHONE ENCOUNTER
Routing to COVID HUB     Pos COVID  2/28    No breathing discomfort, pulse ox at 96-97%      Barbara RN    Triage Nurse  [facility]

## 2024-03-04 ENCOUNTER — PATIENT OUTREACH (OUTPATIENT)
Dept: CARE COORDINATION | Facility: CLINIC | Age: 49
End: 2024-03-04
Payer: COMMERCIAL

## 2024-03-12 ENCOUNTER — THERAPY VISIT (OUTPATIENT)
Dept: PHYSICAL THERAPY | Facility: CLINIC | Age: 49
End: 2024-03-12
Payer: COMMERCIAL

## 2024-03-12 DIAGNOSIS — G89.29 CHRONIC PAIN OF BOTH SHOULDERS: ICD-10-CM

## 2024-03-12 DIAGNOSIS — G54.0 TOS (THORACIC OUTLET SYNDROME): ICD-10-CM

## 2024-03-12 DIAGNOSIS — M25.511 CHRONIC PAIN OF BOTH SHOULDERS: ICD-10-CM

## 2024-03-12 DIAGNOSIS — M25.512 CHRONIC PAIN OF BOTH SHOULDERS: ICD-10-CM

## 2024-03-12 DIAGNOSIS — M54.2 CERVICALGIA: Primary | ICD-10-CM

## 2024-03-12 PROCEDURE — 97140 MANUAL THERAPY 1/> REGIONS: CPT | Mod: GP | Performed by: PHYSICAL THERAPIST

## 2024-03-12 PROCEDURE — 97112 NEUROMUSCULAR REEDUCATION: CPT | Mod: GP | Performed by: PHYSICAL THERAPIST

## 2024-03-12 PROCEDURE — 97110 THERAPEUTIC EXERCISES: CPT | Mod: GP | Performed by: PHYSICAL THERAPIST

## 2024-03-26 ENCOUNTER — THERAPY VISIT (OUTPATIENT)
Dept: PHYSICAL THERAPY | Facility: CLINIC | Age: 49
End: 2024-03-26
Payer: COMMERCIAL

## 2024-03-26 DIAGNOSIS — G54.0 TOS (THORACIC OUTLET SYNDROME): ICD-10-CM

## 2024-03-26 DIAGNOSIS — M54.2 CERVICALGIA: Primary | ICD-10-CM

## 2024-03-26 DIAGNOSIS — M25.511 CHRONIC PAIN OF BOTH SHOULDERS: ICD-10-CM

## 2024-03-26 DIAGNOSIS — M25.512 CHRONIC PAIN OF BOTH SHOULDERS: ICD-10-CM

## 2024-03-26 DIAGNOSIS — G89.29 CHRONIC PAIN OF BOTH SHOULDERS: ICD-10-CM

## 2024-03-26 PROCEDURE — 97140 MANUAL THERAPY 1/> REGIONS: CPT | Mod: GP | Performed by: PHYSICAL THERAPIST

## 2024-03-26 PROCEDURE — 97112 NEUROMUSCULAR REEDUCATION: CPT | Mod: GP | Performed by: PHYSICAL THERAPIST

## 2024-03-29 ENCOUNTER — THERAPY VISIT (OUTPATIENT)
Dept: PHYSICAL THERAPY | Facility: CLINIC | Age: 49
End: 2024-03-29
Payer: COMMERCIAL

## 2024-03-29 DIAGNOSIS — M62.89 PELVIC FLOOR DYSFUNCTION: ICD-10-CM

## 2024-03-29 DIAGNOSIS — N39.46 MIXED STRESS AND URGE URINARY INCONTINENCE: Primary | ICD-10-CM

## 2024-03-29 DIAGNOSIS — M53.3 PAIN IN THE COCCYX: ICD-10-CM

## 2024-03-29 PROCEDURE — 97140 MANUAL THERAPY 1/> REGIONS: CPT | Mod: GP | Performed by: PHYSICAL THERAPIST

## 2024-03-29 PROCEDURE — 97530 THERAPEUTIC ACTIVITIES: CPT | Mod: GP | Performed by: PHYSICAL THERAPIST

## 2024-04-20 SDOH — HEALTH STABILITY: PHYSICAL HEALTH: ON AVERAGE, HOW MANY MINUTES DO YOU ENGAGE IN EXERCISE AT THIS LEVEL?: 0 MIN

## 2024-04-20 SDOH — HEALTH STABILITY: PHYSICAL HEALTH: ON AVERAGE, HOW MANY DAYS PER WEEK DO YOU ENGAGE IN MODERATE TO STRENUOUS EXERCISE (LIKE A BRISK WALK)?: 0 DAYS

## 2024-04-20 ASSESSMENT — SOCIAL DETERMINANTS OF HEALTH (SDOH): HOW OFTEN DO YOU GET TOGETHER WITH FRIENDS OR RELATIVES?: ONCE A WEEK

## 2024-04-20 NOTE — COMMUNITY RESOURCES LIST (ENGLISH)
April 20, 2024           YOUR PERSONALIZED LIST OF SERVICES & PROGRAMS           & RECREATION    Sports      Park [employer] Recreation Board - Sports clubs and recreational activities - [employer] [employer] [facility]  [address] [place] [address] (Distance: 3.3 miles)  Phone: [phone]  Language: English, Tunisian  Fee: Free, Self pay  Accessibility: [place] accessible      Park & Recreation Board - Sports clubs and recreational activities - [facility]  [address] [place] [address] (Distance: 3.6 miles)  Language: English  Fee: Free, Self pay      [facility] - Adult Enrichment  Phone: [phone]  Website: [email or web address]  Language: English  Hours: Mon 7:30 AM - 4:00 PM Tue 7:30 AM - 4:00 PM Wed 7:30 AM - 4:00 PM Thu 7:30 AM - 4:00 PM Fri 7:30 AM - 4:00 PM    Classes/Groups      [facility] - Gym or workout facility  [address] [place] [id number] (Distance: 3.0 miles)  Phone: [phone]  Language: English, Tunisian  Fee: Self pay  Accessibility: [place] accessible      [employer] [facility] - Gym or workout facility - [facility]  [address] [place] [address] (Distance: 6.3 miles)  Language: English  Fee: Free, Self pay  Accessibility: [facility] - Care Coordination (Healthcare only)  Phone: [phone]  Website: [email or web address]  Language: English, Tunisian  Hours: Wed 9:00 AM - 11:30 AM Thu 1:00 PM - 4:00 PM, 5:30 PM - 7:00 PM               IMPORTANT NUMBERS & WEBSITES        Emergency Services  911  .   United Way  211 http://211unitedway.org  .   Poison Control  (995) 271-8899 http://[place]poison.org http://[place]poison.org  .     Suicide and Crisis Lifeline  080  http://988lifeline.org  .   Childhelp [place] Child Abuse Hotline  683.373.1019 http://Childhelphotline.org   .   National Sexual Assault Hotline  (274) 752-7819 (HOPE) http://Rainn.org   .     National Runaway Safeline  (161) 864-1297 (RUNAWAY) http://[employer].ScaleBase  .   Pregnancy & Postpartum Support  Call/text 413-020-0399  [place]: http://ppsupport[place].org  [place]: http://psichapters.com/wi  .   Substance Abuse National Helpline ([facility])  235-305-HELP (5840) http://Findtreatment.gov   .                DISCLAIMER: These resources have been generated via the [employer] Platform. [employer] does not endorse any service providers mentioned in this resource list. [employer] does not guarantee that the services mentioned in this resource list will be available to you or will improve your health or wellness.    [facility]

## 2024-04-22 NOTE — PATIENT INSTRUCTIONS
Your initial labs are normal  Awaiting other labs  Nice to see you   Take care  Derrick Senior D.O.        Patient Education     Preventive Care Advice   This is general advice given by our system to help you stay healthy. However, your care team may have specific advice just for you. Please talk to your care team about your preventive care needs.  Nutrition  Eat 5 or more servings of fruits and vegetables each day.  Try wheat bread, brown rice and whole grain pasta (instead of white bread, rice, and pasta).  Get enough calcium and vitamin D. Check the label on foods and aim for 100% of the RDA (recommended daily allowance).  Lifestyle  Exercise at least 150 minutes each week   (30 minutes a day, 5 days a week).  Do muscle strengthening activities 2 days a week. These help control your weight and prevent disease.  No smoking.  Wear sunscreen to prevent skin cancer.  Have a dental exam and cleaning every 6 months.  Yearly exams  See your health care team every year to talk about:  Any changes in your health.  Any medicines your care team has prescribed.  Preventive care, family planning, and ways to prevent chronic diseases.  Shots (vaccines)   HPV shots (up to age 26), if you've never had them before.  Hepatitis B shots (up to age 59), if you've never had them before.  COVID-19 shot: Get this shot when it's due.  Flu shot: Get a flu shot every year.  Tetanus shot: Get a tetanus shot every 10 years.  Pneumococcal, hepatitis A, and RSV shots: Ask your care team if you need these based on your risk.  Shingles shot (for age 50 and up).  General health tests  Diabetes screening:  Starting at age 35, Get screened for diabetes at least every 3 years.  If you are younger than age 35, ask your care team if you should be screened for diabetes.  Cholesterol test: At age 39, start having a cholesterol test every 5 years, or more often if advised.  Bone density scan (DEXA): At age 50, ask your care team if you should have this  scan for osteoporosis (brittle bones).  Hepatitis C: Get tested at least once in your life.  STIs (sexually transmitted infections)  Before age 24: Ask your care team if you should be screened for STIs.  After age 24: Get screened for STIs if you're at risk. You are at risk for STIs (including HIV) if:  You are sexually active with more than one person.  You don't use condoms every time.  You or a partner was diagnosed with a sexually transmitted infection.  If you are at risk for HIV, ask about PrEP medicine to prevent HIV.  Get tested for HIV at least once in your life, whether you are at risk for HIV or not.  Cancer screening tests  Cervical cancer screening: If you have a cervix, begin getting regular cervical cancer screening tests at age 21. Most people who have regular screenings with normal results can stop after age 65. Talk about this with your provider.  Breast cancer scan (mammogram): If you've ever had breasts, begin having regular mammograms starting at age 40. This is a scan to check for breast cancer.  Colon cancer screening: It is important to start screening for colon cancer at age 45.  Have a colonoscopy test every 10 years (or more often if you're at risk) Or, ask your provider about stool tests like a FIT test every year or Cologuard test every 3 years.  To learn more about your testing options, visit: https://www.[employer]/681810.pdf.  For help making a decision, visit: https://bit.ly/kp70850.  Prostate cancer screening test: If you have a prostate and are age 55 to 69, ask your provider if you would benefit from a yearly prostate cancer screening test.  Lung cancer screening: If you are a current or former smoker age 50 to 80, ask your care team if ongoing lung cancer screenings are right for you.  For informational purposes only. Not to replace the advice of your health care provider. Copyright   2023 [employer]. All rights reserved. Clinically reviewed by the [facility]  [facility]. [employer] 013110 - REV 01/24.    Learning About Stress  What is stress?     Stress is your body's response to a hard situation. Your body can have a physical, emotional, or mental response. Stress is a fact of life for most people, and it affects everyone differently. What causes stress for you may not be stressful for someone else.  A lot of things can cause stress. You may feel stress when you go on a job interview, take a test, or run a race. This kind of short-term stress is normal and even useful. It can help you if you need to work hard or react quickly. For example, stress can help you finish an important job on time.  Long-term stress is caused by ongoing stressful situations or events. Examples of long-term stress include long-term health problems, ongoing problems at work, or conflicts in your family. Long-term stress can harm your health.  How does stress affect your health?  When you are stressed, your body responds as though you are in danger. It makes hormones that speed up your heart, make you breathe faster, and give you a burst of energy. This is called the fight-or-flight stress response. If the stress is over quickly, your body goes back to normal and no harm is done.  But if stress happens too often or lasts too long, it can have bad effects. Long-term stress can make you more likely to get sick, and it can make symptoms of some diseases worse. If you tense up when you are stressed, you may develop neck, shoulder, or low back pain. Stress is linked to high blood pressure and heart disease.  Stress also harms your emotional health. It can make you [place], tense, or depressed. Your relationships may suffer, and you may not do well at work or school.  What can you do to manage stress?  You can try these things to help manage stress:   Do something active. Exercise or activity can help reduce stress. Walking is a great way to get started. Even everyday activities such as  housecleaning or yard work can help.  Try yoga or gwen chi. These techniques combine exercise and meditation. You may need some training at first to learn them.  Do something you enjoy. For example, listen to music or go to a movie. Practice your hobby or do volunteer work.  Meditate. This can help you relax, because you are not worrying about what happened before or what may happen in the future.  Do guided imagery. Imagine yourself in any setting that helps you feel calm. You can use online videos, books, or a teacher to guide you.  Do breathing exercises. For example:  From a standing position, bend forward from the waist with your knees slightly bent. Let your arms dangle close to the floor.  Breathe in slowly and deeply as you return to a standing position. Roll up slowly and lift your head last.  Hold your breath for just a few seconds in the standing position.  Breathe out slowly and bend forward from the waist.  Let your feelings out. Talk, laugh, cry, and express anger when you need to. Talking with supportive friends or family, a counselor, or a vini leader about your feelings is a healthy way to relieve stress. Avoid discussing your feelings with people who make you feel worse.  Write. It may help to write about things that are bothering you. This helps you find out how much stress you feel and what is causing it. When you know this, you can find better ways to cope.  What can you do to prevent stress?  You might try some of these things to help prevent stress:  Manage your time. This helps you find time to do the things you want and need to do.  Get enough sleep. Your body recovers from the stresses of the day while you are sleeping.  Get support. Your family, friends, and community can make a difference in how you experience stress.  Limit your news feed. Avoid or limit time on social media or news that may make you feel stressed.  Do something active. Exercise or activity can help reduce stress.  "Walking is a great way to get started.  Where can you learn more?  Go to https://www.[employer].net/patiented  Enter N032 in the search box to learn more about \"Learning About Stress.\"  Current as of: October 24, 2023               Content Version: 14.0    0943-7204 [employer].   Care instructions adapted under license by your healthcare professional. If you have questions about a medical condition or this instruction, always ask your healthcare professional. [employer], [employer] disclaims any warranty or liability for your use of this information.      "

## 2024-04-22 NOTE — PROGRESS NOTES
Preventive Care Visit  [facility]  Derrick Senior DO, Family Medicine  Apr 23, 2024      1. Routine general medical examination at a health care facility  No concerns.  Has been doing well since the last visit.  She does see an OB/GYN for her breast and gynecological exam and declines breast exam today  - Lipid panel reflex to direct LDL Fasting; Future  - Hemoglobin A1c; Future  - CBC with platelets and differential; Future  - Comprehensive metabolic panel (BMP + Alb, Alk Phos, ALT, AST, Total. Bili, TP); Future  - Lipid panel reflex to direct LDL Fasting  - Hemoglobin A1c  - CBC with platelets and differential  - Comprehensive metabolic panel (BMP + Alb, Alk Phos, ALT, AST, Total. Bili, TP)    2. Vitamin D deficiency  Not currently taking supplementation.  Advise continue vitamin D check labs today  - Vitamin D Deficiency; Future  - Vitamin D Deficiency    3. IUD (intrauterine device) in place  IUD in place no concerns    4. Visit for screening mammogram    - MA Screen Bilateral w/Ranjan; Future    5. Screening for thyroid disorder    - TSH with free T4 reflex; Future  - TSH with free T4 reflex    6. Lipid screening  - Lipid panel reflex to direct LDL Fasting; Future  - Lipid panel reflex to direct LDL Fasting    7. Screening for diabetes mellitus    - Comprehensive metabolic panel (BMP + Alb, Alk Phos, ALT, AST, Total. Bili, TP); Future  - Comprehensive metabolic panel (BMP + Alb, Alk Phos, ALT, AST, Total. Bili, TP)      Subjective   Sunshine is a 48 year old, presenting for the following:  Physical            4/22/2024    12:24 PM   Additional Questions   Roomed by [facility]          Health Care Directive  Patient does not have a Health Care Directive or Living Will: Discussed advance care planning with patient; however, patient declined at this time.    HPI    Fasting for labs today.           4/20/2024   General Health   How would you rate your overall physical health? (!) FAIR    Feel stress (tense, anxious, or unable to sleep) Only a little   (!) STRESS CONCERN      4/20/2024   Nutrition   Three or more servings of calcium each day? Yes   Diet: Regular (no restrictions)   How many servings of fruit and vegetables per day? (!) 2-3   How many sweetened beverages each day? 0-1         4/20/2024   Exercise   Days per week of moderate/strenous exercise 0 days   Average minutes spent exercising at this level 0 min   (!) EXERCISE CONCERN      4/20/2024   Social Factors   Frequency of gathering with friends or relatives Once a week   Worry food won't last until get money to buy more No   Food not last or not have enough money for food? No   Do you have housing?  Yes   Are you worried about losing your housing? No   Lack of transportation? No   Unable to get utilities (heat,electricity)? No         4/20/2024   Dental   Dentist two times every year? Yes         4/20/2024   TB Screening   Were you born outside of the [place]? No           Today's PHQ-2 Score:       2/12/2024    10:52 AM   PHQ-2 ( 1999 [employer])   Q1: Little interest or pleasure in doing things 0   Q2: Feeling down, depressed or hopeless 0   PHQ-2 Score 0   PHQ-2 Total Score (12-17 Years)- Positive if 3 or more points; Administer PHQ-A if positive 0         4/20/2024   Substance Use   Alcohol more than 3/day or more than 7/wk No   Do you use any other substances recreationally? No     Social History     Tobacco Use    Smoking status: Never     Passive exposure: Never    Smokeless tobacco: Never   Vaping Use    Vaping status: Never Used   Substance Use Topics    Alcohol use: Yes     Comment: 1-2 per week    Drug use: No           10/11/2023   LAST FHS-7 RESULTS   1st degree relative breast or ovarian cancer No   Any relative bilateral breast cancer No   Any male have breast cancer No   Any ONE woman have BOTH breast AND ovarian cancer No   Any woman with breast cancer before 50yrs No   2 or more relatives with breast AND/OR ovarian cancer No    2 or more relatives with breast AND/OR bowel cancer No        Mri alternating with Mammo forbreast cancer screeing        2024   STI Screening   New sexual partner(s) since last STI/HIV test? No     History of abnormal Pap smear: NO - age 30- 65 PAP every 3 years recommended        Latest Ref Rng & Units 10/1/2021    12:15 PM 2018     3:32 PM 2018     3:22 PM   PAP / HPV   PAP  Negative for Intraepithelial Lesion or Malignancy (NILM)      PAP (Historical)    NIL    HPV 16 DNA Negative Negative  Negative     HPV 18 DNA Negative Negative  Negative     Other HR HPV Negative Negative  Negative       ASCVD Risk   The 10-year ASCVD risk score (Sangeeta FARIA, et al., 2019) is: 0.8%    Values used to calculate the score:      Age: 48 years      Sex: Female      Is Non- : No      Diabetic: No      Tobacco smoker: No      Systolic Blood Pressure: 122 mmHg      Is BP treated: No      HDL Cholesterol: 55 mg/dL      Total Cholesterol: 163 mg/dL        2024   Contraception/Family Planning   Questions about contraception or family planning No        Reviewed and updated as needed this visit by Provider                    Past Medical History:   Diagnosis Date    Benign fasciculation-cramp syndrome     Uncomplicated asthma 1992    viral induced     Past Surgical History:   Procedure Laterality Date    COLONOSCOPY N/A 3/5/2021    Procedure: COLONOSCOPY, WITH POLYPECTOMY;  Surgeon: Jesse Asencio MD;  [place] [facility] [place]    NO HISTORY OF SURGERY       OB History    Para Term  AB Living   3 3 3 0 0 3   SAB IAB Ectopic Multiple Live Births   0 0 0 0 3      # Outcome Date GA Lbr Josh/2nd Weight Sex Type Anes PTL Lv   3 Term         YISSEL   2 Term         YISSEL   1 Term         YISSEL         Review of Systems  Constitutional, HEENT, cardiovascular, pulmonary, GI, , musculoskeletal, neuro, skin, endocrine and psych systems are negative, except as otherwise  "noted.     Objective    Exam  /72   Pulse 70   Temp 98  F (36.7  C) (Oral)   Resp 16   Ht 1.753 m (5' 9\")   Wt 91.6 kg (202 lb)   LMP  (LMP Unknown)   SpO2 100%   BMI 29.83 kg/m     Estimated body mass index is 29.83 kg/m  as calculated from the following:    Height as of this encounter: 1.753 m (5' 9\").    Weight as of this encounter: 91.6 kg (202 lb).    Physical Exam  GENERAL: alert and no distress  EYES: Eyes grossly normal to inspection, PERRL and conjunctivae and sclerae normal  HENT: ear canals and TM's normal, nose and mouth without ulcers or lesions  NECK: no adenopathy, no asymmetry, masses, or scars  RESP: lungs clear to auscultation - no rales, rhonchi or wheezes  CV: regular rate and rhythm, normal S1 S2, no S3 or S4, no murmur, click or rub, no peripheral edema  Breast/ exam -declined  ABDOMEN: soft, nontender, no hepatosplenomegaly, no masses and bowel sounds normal  MS: no gross musculoskeletal defects noted, no edema  SKIN: no suspicious lesions or rashes  NEURO: Normal strength and tone, mentation intact and speech normal  PSYCH: mentation appears normal, affect normal/bright        Signed Electronically by: Derrick Senior DO    "

## 2024-04-23 ENCOUNTER — OFFICE VISIT (OUTPATIENT)
Dept: FAMILY MEDICINE | Facility: CLINIC | Age: 49
End: 2024-04-23
Payer: COMMERCIAL

## 2024-04-23 VITALS
RESPIRATION RATE: 16 BRPM | OXYGEN SATURATION: 100 % | SYSTOLIC BLOOD PRESSURE: 122 MMHG | BODY MASS INDEX: 29.92 KG/M2 | TEMPERATURE: 98 F | HEIGHT: 69 IN | DIASTOLIC BLOOD PRESSURE: 72 MMHG | WEIGHT: 202 LBS | HEART RATE: 70 BPM

## 2024-04-23 DIAGNOSIS — Z12.31 VISIT FOR SCREENING MAMMOGRAM: ICD-10-CM

## 2024-04-23 DIAGNOSIS — Z00.00 ROUTINE GENERAL MEDICAL EXAMINATION AT A HEALTH CARE FACILITY: Primary | ICD-10-CM

## 2024-04-23 DIAGNOSIS — Z97.5 IUD (INTRAUTERINE DEVICE) IN PLACE: ICD-10-CM

## 2024-04-23 DIAGNOSIS — Z13.220 LIPID SCREENING: ICD-10-CM

## 2024-04-23 DIAGNOSIS — Z13.1 SCREENING FOR DIABETES MELLITUS: ICD-10-CM

## 2024-04-23 DIAGNOSIS — E55.9 VITAMIN D DEFICIENCY: ICD-10-CM

## 2024-04-23 DIAGNOSIS — Z13.29 SCREENING FOR THYROID DISORDER: ICD-10-CM

## 2024-04-23 LAB
ALBUMIN SERPL BCG-MCNC: 4.4 G/DL (ref 3.5–5.2)
ALP SERPL-CCNC: 57 U/L (ref 40–150)
ALT SERPL W P-5'-P-CCNC: 19 U/L (ref 0–50)
ANION GAP SERPL CALCULATED.3IONS-SCNC: 9 MMOL/L (ref 7–15)
AST SERPL W P-5'-P-CCNC: 17 U/L (ref 0–45)
BASOPHILS # BLD AUTO: 0 10E3/UL (ref 0–0.2)
BASOPHILS NFR BLD AUTO: 1 %
BILIRUB SERPL-MCNC: 0.5 MG/DL
BUN SERPL-MCNC: 14.8 MG/DL (ref 6–20)
CALCIUM SERPL-MCNC: 9 MG/DL (ref 8.6–10)
CHLORIDE SERPL-SCNC: 104 MMOL/L (ref 98–107)
CHOLEST SERPL-MCNC: 161 MG/DL
CREAT SERPL-MCNC: 0.97 MG/DL (ref 0.51–0.95)
DEPRECATED HCO3 PLAS-SCNC: 26 MMOL/L (ref 22–29)
EGFRCR SERPLBLD CKD-EPI 2021: 71 ML/MIN/1.73M2
EOSINOPHIL # BLD AUTO: 0.2 10E3/UL (ref 0–0.7)
EOSINOPHIL NFR BLD AUTO: 3 %
ERYTHROCYTE [DISTWIDTH] IN BLOOD BY AUTOMATED COUNT: 12.2 % (ref 10–15)
FASTING STATUS PATIENT QL REPORTED: YES
GLUCOSE SERPL-MCNC: 98 MG/DL (ref 70–99)
HBA1C MFR BLD: 4.8 % (ref 0–5.6)
HCT VFR BLD AUTO: 41.6 % (ref 35–47)
HDLC SERPL-MCNC: 52 MG/DL
HGB BLD-MCNC: 14.1 G/DL (ref 11.7–15.7)
IMM GRANULOCYTES # BLD: 0 10E3/UL
IMM GRANULOCYTES NFR BLD: 0 %
LDLC SERPL CALC-MCNC: 95 MG/DL
LYMPHOCYTES # BLD AUTO: 1.3 10E3/UL (ref 0.8–5.3)
LYMPHOCYTES NFR BLD AUTO: 18 %
MCH RBC QN AUTO: 30.8 PG (ref 26.5–33)
MCHC RBC AUTO-ENTMCNC: 33.9 G/DL (ref 31.5–36.5)
MCV RBC AUTO: 91 FL (ref 78–100)
MONOCYTES # BLD AUTO: 0.5 10E3/UL (ref 0–1.3)
MONOCYTES NFR BLD AUTO: 6 %
NEUTROPHILS # BLD AUTO: 5.5 10E3/UL (ref 1.6–8.3)
NEUTROPHILS NFR BLD AUTO: 73 %
NONHDLC SERPL-MCNC: 109 MG/DL
PLATELET # BLD AUTO: 197 10E3/UL (ref 150–450)
POTASSIUM SERPL-SCNC: 4.3 MMOL/L (ref 3.4–5.3)
PROT SERPL-MCNC: 6.8 G/DL (ref 6.4–8.3)
RBC # BLD AUTO: 4.58 10E6/UL (ref 3.8–5.2)
SODIUM SERPL-SCNC: 139 MMOL/L (ref 135–145)
T4 FREE SERPL-MCNC: 1.1 NG/DL (ref 0.9–1.7)
TRIGL SERPL-MCNC: 68 MG/DL
TSH SERPL DL<=0.005 MIU/L-ACNC: 4.31 UIU/ML (ref 0.3–4.2)
VIT D+METAB SERPL-MCNC: 36 NG/ML (ref 20–50)
WBC # BLD AUTO: 7.5 10E3/UL (ref 4–11)

## 2024-04-23 PROCEDURE — 83036 HEMOGLOBIN GLYCOSYLATED A1C: CPT | Performed by: FAMILY MEDICINE

## 2024-04-23 PROCEDURE — 85025 COMPLETE CBC W/AUTO DIFF WBC: CPT | Performed by: FAMILY MEDICINE

## 2024-04-23 PROCEDURE — 99213 OFFICE O/P EST LOW 20 MIN: CPT | Mod: 25 | Performed by: FAMILY MEDICINE

## 2024-04-23 PROCEDURE — 80053 COMPREHEN METABOLIC PANEL: CPT | Performed by: FAMILY MEDICINE

## 2024-04-23 PROCEDURE — 36415 COLL VENOUS BLD VENIPUNCTURE: CPT | Performed by: FAMILY MEDICINE

## 2024-04-23 PROCEDURE — 84439 ASSAY OF FREE THYROXINE: CPT | Performed by: FAMILY MEDICINE

## 2024-04-23 PROCEDURE — 80061 LIPID PANEL: CPT | Performed by: FAMILY MEDICINE

## 2024-04-23 PROCEDURE — 84443 ASSAY THYROID STIM HORMONE: CPT | Performed by: FAMILY MEDICINE

## 2024-04-23 PROCEDURE — 99396 PREV VISIT EST AGE 40-64: CPT | Performed by: FAMILY MEDICINE

## 2024-04-23 PROCEDURE — 82306 VITAMIN D 25 HYDROXY: CPT | Performed by: FAMILY MEDICINE

## 2024-04-23 ASSESSMENT — PAIN SCALES - GENERAL: PAINLEVEL: NO PAIN (0)

## 2024-05-02 ENCOUNTER — THERAPY VISIT (OUTPATIENT)
Dept: PHYSICAL THERAPY | Facility: CLINIC | Age: 49
End: 2024-05-02
Payer: COMMERCIAL

## 2024-05-02 DIAGNOSIS — M54.2 CERVICALGIA: Primary | ICD-10-CM

## 2024-05-02 DIAGNOSIS — M25.512 CHRONIC PAIN OF BOTH SHOULDERS: ICD-10-CM

## 2024-05-02 DIAGNOSIS — G54.0 TOS (THORACIC OUTLET SYNDROME): ICD-10-CM

## 2024-05-02 DIAGNOSIS — G89.29 CHRONIC PAIN OF BOTH SHOULDERS: ICD-10-CM

## 2024-05-02 DIAGNOSIS — M25.511 CHRONIC PAIN OF BOTH SHOULDERS: ICD-10-CM

## 2024-05-02 PROCEDURE — 97110 THERAPEUTIC EXERCISES: CPT | Mod: GP | Performed by: PHYSICAL THERAPIST

## 2024-07-05 ENCOUNTER — OFFICE VISIT (OUTPATIENT)
Dept: FAMILY MEDICINE | Facility: CLINIC | Age: 49
End: 2024-07-05
Payer: COMMERCIAL

## 2024-07-05 ENCOUNTER — ANCILLARY PROCEDURE (OUTPATIENT)
Dept: GENERAL RADIOLOGY | Facility: CLINIC | Age: 49
End: 2024-07-05
Attending: NURSE PRACTITIONER
Payer: COMMERCIAL

## 2024-07-05 VITALS
BODY MASS INDEX: 30.36 KG/M2 | DIASTOLIC BLOOD PRESSURE: 77 MMHG | OXYGEN SATURATION: 98 % | HEART RATE: 79 BPM | WEIGHT: 205 LBS | TEMPERATURE: 98.4 F | RESPIRATION RATE: 18 BRPM | HEIGHT: 69 IN | SYSTOLIC BLOOD PRESSURE: 110 MMHG

## 2024-07-05 DIAGNOSIS — M21.612 BUNION, LEFT: ICD-10-CM

## 2024-07-05 DIAGNOSIS — M79.672 LEFT FOOT PAIN: Primary | ICD-10-CM

## 2024-07-05 DIAGNOSIS — R89.9 ABNORMAL LABORATORY TEST RESULT: ICD-10-CM

## 2024-07-05 DIAGNOSIS — M21.611 BUNION, RIGHT: ICD-10-CM

## 2024-07-05 DIAGNOSIS — M79.672 LEFT FOOT PAIN: ICD-10-CM

## 2024-07-05 LAB — TSH SERPL DL<=0.005 MIU/L-ACNC: 4.17 UIU/ML (ref 0.3–4.2)

## 2024-07-05 PROCEDURE — 99214 OFFICE O/P EST MOD 30 MIN: CPT | Performed by: NURSE PRACTITIONER

## 2024-07-05 PROCEDURE — 84443 ASSAY THYROID STIM HORMONE: CPT | Performed by: NURSE PRACTITIONER

## 2024-07-05 PROCEDURE — 73630 X-RAY EXAM OF FOOT: CPT | Mod: TC | Performed by: RADIOLOGY

## 2024-07-05 PROCEDURE — 36415 COLL VENOUS BLD VENIPUNCTURE: CPT | Performed by: NURSE PRACTITIONER

## 2024-07-05 ASSESSMENT — PAIN SCALES - GENERAL: PAINLEVEL: NO PAIN (0)

## 2024-07-05 NOTE — PROGRESS NOTES
"  Assessment & Plan     Left foot pain  No acute fracture noted upon initial provider review. Will notify patient when official read from radiology comes through. Ortho referral to address bunions and possibility that they are causing the rest of her foot pain. Discussed OTC recommendations for symptom mgmt and when to follow-up.      - XR Foot Left G/E 3 Views; Future  - Orthopedic  Referral; Future    Bunion, left  See above.     - Orthopedic  Referral; Future    Bunion, right  See above.     - Orthopedic  Referral; Future    Abnormal laboratory test result  Rechecking TSH as it was slightly elevated last time.     - TSH with free T4 reflex; Future  - TSH with free T4 reflex          BMI  Estimated body mass index is 30.27 kg/m  as calculated from the following:    Height as of this encounter: 1.753 m (5' 9\").    Weight as of this encounter: 93 kg (205 lb).         Patient Instructions   Stretching tight calves    Labs done today. Those will be available via [employer] within the next couple days and you will get a response from me shortly thereafter.     Podiatry referral placed.     Rashel Herrera is a 48 year old, presenting for the following health issues:  Musculoskeletal Problem    History of Present Illness       Reason for visit:  Pain in left foot, particularly along outside edge. Affectionaly refer to it as my \"broken foot\". But also have general low level pain from high ankle down to my feet in hyde legs  Symptom onset:  More than a month  Symptoms include:  Pain when stepping. Extreme stiffness upon waking or sitting for extended amounts of time. Pain when squeezing foot along sides, or moving awkwardly.  Symptom intensity:  Moderate  Symptom progression:  Worsening  Had these symptoms before:  Yes  Has tried/received treatment for these symptoms:  No  What makes it worse:  Sitting or sleeping and then standing. Not wearing supportive shoes in the house. Bending toes too " far forward or back.  What makes it better:  Movement makes the pain less severe, but upon initial standing it can take awhile. Never completely goes away.    She eats 2-3 servings of fruits and vegetables daily.She consumes 0 sweetened beverage(s) daily.She exercises with enough effort to increase her heart rate 9 or less minutes per day.  She exercises with enough effort to increase her heart rate 3 or less days per week.   She is taking medications regularly.           Additional provider notes: Patient presents in clinic for the following:     Left foot pain: pain on the lateral edge. She was recently in [place] and did a lot of swimming. She has baseline foot pain for years, but recently went swimming in [place] and feels it has worsened. Shoes help with pain. No x-rays since she was in her 20s.     Bilat foot pain: at the heel/ankle area on both feet. Worse after she's been sitting for long periods of time or first thing in the morning. Better once she gets moving.     Hx of bilat bunions since she was in her 20s. She was told she could have surgery at that time, but decided not to. Still with foot pain and wonders if her other foot complaints have to do with bunions. States she doesn't wear small toebox or pointed shoes.     Abnormal TSH last visit. Will recheck today.     Allergies   Allergen Reactions    Seasonal Allergies        Current Outpatient Medications   Medication Sig Dispense Refill    cetirizine (ZYRTEC) 5 MG tablet Take 5 mg by mouth daily      levonorgestrel (MIRENA) 20 MCG/DAY IUD 1 each (20 mcg) by Intrauterine route once      multivitamin (CENTRUM SILVER) tablet Take 1 tablet by mouth daily      rizatriptan (MAXALT) 10 MG tablet Take 1 tablet (10 mg) by mouth at onset of headache for migraine (repeat in 2 hours if needed) May repeat in 2 hours. Max 3 tablets/24 hours. 18 tablet 3    Cholecalciferol (VITAMIN D3 PO) Take 5,000 Units by mouth daily (Patient not taking: Reported on 11/21/2023)    "   fluticasone (FLONASE) 50 MCG/ACT nasal spray Spray 1 spray into both nostrils daily Uses seasonally (Patient not taking: Reported on 11/21/2023)      ketoconazole (NIZORAL) 2 % external cream Apply 1 Application topically See Admin Instructions (Patient not taking: Reported on 11/21/2023)       No current facility-administered medications for this visit.       Past Medical History:   Diagnosis Date    Benign fasciculation-cramp syndrome     Uncomplicated asthma 1992    viral induced            Review of Systems  Constitutional, HEENT, cardiovascular, pulmonary, gi and gu systems are negative, except as otherwise noted.      Objective    Temp 98.4  F (36.9  C) (Oral)   Resp 18   Ht 1.753 m (5' 9\")   Wt 93 kg (205 lb)   BMI 30.27 kg/m    Body mass index is 30.27 kg/m .  Physical Exam  Vitals reviewed.   Constitutional:       Appearance: Normal appearance.   Musculoskeletal:         General: Normal range of motion.        Feet:    Skin:     General: Skin is warm and dry.   Neurological:      Mental Status: She is alert and oriented to person, place, and time.   Psychiatric:         Behavior: Behavior normal.           Xray - Reviewed and interpreted by me.  Left foot x-ray: bunion present, no sign of fracture.       XR Foot Left G/E 3 Views    Result Date: 7/5/2024  XR FOOT LEFT G/E 3 VIEWS 7/5/2024 8:36 AM HISTORY: left lateral foot pain for years, [place]; Left foot pain COMPARISON: None.     IMPRESSION: Slight hallux valgus and mild bunion deformity with low-grade degenerative change at the first MTP joint. Accessory os navicularis. No evidence for acute fracture. Achilles calcaneal spurring. TOBI MCDONALD MD   SYSTEM ID:  [id number]       Signed Electronically by: Karolyn Skinner DNP    "

## 2024-07-05 NOTE — PATIENT INSTRUCTIONS
Stretching tight calves    Labs done today. Those will be available via [employer] within the next couple days and you will get a response from me shortly thereafter.     Podiatry referral placed.

## 2024-08-08 ENCOUNTER — OFFICE VISIT (OUTPATIENT)
Dept: PODIATRY | Facility: CLINIC | Age: 49
End: 2024-08-08
Attending: NURSE PRACTITIONER
Payer: COMMERCIAL

## 2024-08-08 VITALS — DIASTOLIC BLOOD PRESSURE: 80 MMHG | SYSTOLIC BLOOD PRESSURE: 120 MMHG | HEART RATE: 85 BPM

## 2024-08-08 DIAGNOSIS — M79.672 LEFT FOOT PAIN: ICD-10-CM

## 2024-08-08 DIAGNOSIS — M76.61 ACHILLES TENDINITIS OF RIGHT LOWER EXTREMITY: Primary | ICD-10-CM

## 2024-08-08 PROCEDURE — 99244 OFF/OP CNSLTJ NEW/EST MOD 40: CPT | Performed by: PODIATRIST

## 2024-08-08 RX ORDER — MELOXICAM 15 MG/1
15 TABLET ORAL DAILY
Qty: 30 TABLET | Refills: 0 | Status: SHIPPED | OUTPATIENT
Start: 2024-08-08

## 2024-08-08 NOTE — PATIENT INSTRUCTIONS
We wish you continued good healing. If you have any questions or concerns, please do not hesitate to contact us at  [phone]    [facility] (secure e-mail communication and access to your chart) to send a message or to make an appointment.    Please remember to call and schedule a follow up appointment if one was recommended at your earliest convenience.     PODIATRY CLINIC HOURS  TELEPHONE NUMBER    Dr. Victor Manuel RIBERAPNATACHA FACFAS        Clinics:  [facility]   [place]  Tuesday 1PM-6PM  [place]  Wednesday 745AM-330PM  [place]  Monday 2nd,4th  830AM-4PM  Thursday/Friday 745AM-230PM     [place] APPOINTMENTS  [phone]    [place] APPOINTMENTS  [phone]          If you need a medication refill, please contact us you may need lab work and/or a follow up visit prior to your refill (i.e. Antifungal medications).  If MRI needed please call Imaging at [phone]   HOW DO I GET MY KNEE SCOOTER? Knee scooters can be picked up at ANY Medical Supply stores with your knee scooter Prescription.  OR  Bring your signed prescription to an [facility] showroom.   Set up an appointment for your custom Orthotics. Call any Orthotics locations call [phone]

## 2024-08-08 NOTE — PROGRESS NOTES
Subjective:    Pt is seen today in consult from Karolyn Argueta for chronic right foot pain.  Has had this for 3 years.  She points to the area of her styloid process.  Some post static pain.  Pain aggravated by activity and relieved by rest.  Some forefoot pain as well.  Believes she is wearing tight shoes in the past which contributed to forefoot pain.  She is no longer doing this.  She works out of her home.  When she wears a good cork sandal and minimal pain.  She has some post static pain.  She denies any history of trauma.  She denies erythema ecchymosis weakness.  When she was sick for 2 weeks and in bed her pain resolved and did not hurt at all.  She has a history of chronic hip pain.  The right hip is worse than the left.  States last night just started having posterior right heel pain importance to Achilles tendon.  Denies change in activities.  Denies erythema edema or ecchymosis.  She does go up and down a lot of stairs in her house.  She has never smoked.  Her mother had hypertension.    ROS:  A 10-point review of systems was performed and is positive for that noted in the HPI and as seen above.  All other areas are negative.        Allergies   Allergen Reactions    Seasonal Allergies        Current Outpatient Medications   Medication Sig Dispense Refill    meloxicam (MOBIC) 15 MG tablet Take 1 tablet (15 mg) by mouth daily 30 tablet 0    cetirizine (ZYRTEC) 5 MG tablet Take 5 mg by mouth daily      Cholecalciferol (VITAMIN D3 PO) Take 5,000 Units by mouth daily (Patient not taking: Reported on 11/21/2023)      fluticasone (FLONASE) 50 MCG/ACT nasal spray Spray 1 spray into both nostrils daily Uses seasonally (Patient not taking: Reported on 11/21/2023)      ketoconazole (NIZORAL) 2 % external cream Apply 1 Application topically See Admin Instructions (Patient not taking: Reported on 11/21/2023)      levonorgestrel (MIRENA) 20 MCG/DAY IUD 1 each (20 mcg) by Intrauterine route once      multivitamin  (CENTRUM SILVER) tablet Take 1 tablet by mouth daily      rizatriptan (MAXALT) 10 MG tablet Take 1 tablet (10 mg) by mouth at onset of headache for migraine (repeat in 2 hours if needed) May repeat in 2 hours. Max 3 tablets/24 hours. 18 tablet 3       Patient Active Problem List   Diagnosis    Encounter for removal and reinsertion of intrauterine contraceptive device    Bilateral carpal tunnel syndrome    Encounter for IUD insertion    Tendinopathy of right gluteus medius    Tendinopathy of left gluteus medius    Benign fasciculation-cramp syndrome    Mixed stress and urge urinary incontinence    Pelvic floor dysfunction    Pain in the coccyx    Cervicalgia    TOS (thoracic outlet syndrome)    Chronic pain of both shoulders       Past Medical History:   Diagnosis Date    Benign fasciculation-cramp syndrome     Uncomplicated asthma 1992    viral induced       Past Surgical History:   Procedure Laterality Date    COLONOSCOPY N/A 3/5/2021    Procedure: COLONOSCOPY, WITH POLYPECTOMY;  Surgeon: Jesse Asencio MD;  Location: [facility] OR    NO HISTORY OF SURGERY         Family History   Problem Relation Age of Onset    Hypertension Mother     Colon Cancer Maternal Grandmother     Diabetes Paternal Grandmother     Breast Cancer Paternal Grandmother         in her 70s.    Lupus No family hx of     Rheumatoid Arthritis No family hx of     Rheumatologic Disease No family hx of        Social History     Tobacco Use    Smoking status: Never     Passive exposure: Never    Smokeless tobacco: Never   Substance Use Topics    Alcohol use: Yes     Comment: 1-2 per week             O:  /80   Pulse 85 .       Constitutional/ general:  Pt is in no apparent distress, appears well-nourished.  Cooperative with history and physical exam.     Psych:  The patient answered questions appropriately.  Normal affect.  Seems to have reasonable expectations, in terms of treatment.     Eyes:  Visual scanning/ tracking without deficit.      Ears:  Response to auditory stimuli is normal.  negative hearing aid devices.  Auricles in proper alignment.     Lymphatic:  Popliteal lymph nodes not enlarged.     Lungs:  Non labored breathing, non labored speech. No cough.  No audible wheezing. Even, quiet breathing.       Vascular:  Pedal pulses are palpable bilaterally for both the DP and PT arteries.  CFT < 3 sec.  No edema.  Pedal hair growth noted.     Neuro:  Alert and oriented x 3. Coordinated gait.  Light touch sensation is intact     Derm: Normal texture and turgor.  No erythema, ecchymosis, or cyanosis.  No open lesions.     Musculoskeletal:   Normal arch foot with weightbearing bilateral.  No forefoot or rear foot deformities noted.  MS 5/5 all compartments.  Normal ROM all fore foot and rearfoot joints with no pain or crepitus.  No equinus.  Pain lateral and dorsal on styloid process left foot.  No edema.  No masses.  No ecchymosis.   No forefoot pain at this time.  Negative Olya's click.  Right Achilles tendon slight pain but hard to tell if its at insertion or watershed area.  No edema erythema or ecchymosis noted.    Radiographic Exam:  X-Ray Findings:  I personally reviewed the films, normal    A:   Left chronic lateral foot pain  Right Achilles tendinitis      Plan:  X-rays from past personally reviewed.  Explained to patient how styloid process major junction for tendons and ligaments and lateral foot.  Discussed how this can get overuse.  May be putting more pressure on this foot with contralateral hip pain.  Discussed weight cork shoes make this mostly feel good.  Continue these.  Made suggestions on other stiff shoes.  We wrote a prescription for orthotics to help stabilize her lateral column.  Will ice this.  Explained has some Achilles tendinitis on right.  Will ice this as well.  Discussed importance of stretching lower extremities for all problems.  Discussed meloxicam to reduce inflammation.  She is in agreement to trying this.   Will stop if any GI distress.  Styloid process pain not resolving discussed may need removable walking cast until better.  RTC as needed.  Thank you for allowing me participate in the care of this patient.        Victor Manuel Ambrocio DPM, FACFAS

## 2024-08-08 NOTE — LETTER
8/8/2024      Sharon Waters  [address]  [place] [address]      Dear Colleague,    Thank you for referring your patient, Sharon Waters, to the [facility]. Please see a copy of my visit note below.    Subjective:    Pt is seen today in consult from Karolyn Argueta for chronic right foot pain.  Has had this for 3 years.  She points to the area of her styloid process.  Some post static pain.  Pain aggravated by activity and relieved by rest.  Some forefoot pain as well.  Believes she is wearing tight shoes in the past which contributed to forefoot pain.  She is no longer doing this.  She works out of her home.  When she wears a good cork sandal and minimal pain.  She has some post static pain.  She denies any history of trauma.  She denies erythema ecchymosis weakness.  When she was sick for 2 weeks and in bed her pain resolved and did not hurt at all.  She has a history of chronic hip pain.  The right hip is worse than the left.  States last night just started having posterior right heel pain importance to Achilles tendon.  Denies change in activities.  Denies erythema edema or ecchymosis.  She does go up and down a lot of stairs in her house.  She has never smoked.  Her mother had hypertension.    ROS:  A 10-point review of systems was performed and is positive for that noted in the HPI and as seen above.  All other areas are negative.        Allergies   Allergen Reactions     Seasonal Allergies        Current Outpatient Medications   Medication Sig Dispense Refill     meloxicam (MOBIC) 15 MG tablet Take 1 tablet (15 mg) by mouth daily 30 tablet 0     cetirizine (ZYRTEC) 5 MG tablet Take 5 mg by mouth daily       Cholecalciferol (VITAMIN D3 PO) Take 5,000 Units by mouth daily (Patient not taking: Reported on 11/21/2023)       fluticasone (FLONASE) 50 MCG/ACT nasal spray Spray 1 spray into both nostrils daily Uses seasonally (Patient not taking: Reported on 11/21/2023)        ketoconazole (NIZORAL) 2 % external cream Apply 1 Application topically See Admin Instructions (Patient not taking: Reported on 11/21/2023)       levonorgestrel (MIRENA) 20 MCG/DAY IUD 1 each (20 mcg) by Intrauterine route once       multivitamin (CENTRUM SILVER) tablet Take 1 tablet by mouth daily       rizatriptan (MAXALT) 10 MG tablet Take 1 tablet (10 mg) by mouth at onset of headache for migraine (repeat in 2 hours if needed) May repeat in 2 hours. Max 3 tablets/24 hours. 18 tablet 3       Patient Active Problem List   Diagnosis     Encounter for removal and reinsertion of intrauterine contraceptive device     Bilateral carpal tunnel syndrome     Encounter for IUD insertion     Tendinopathy of right gluteus medius     Tendinopathy of left gluteus medius     Benign fasciculation-cramp syndrome     Mixed stress and urge urinary incontinence     Pelvic floor dysfunction     Pain in the coccyx     Cervicalgia     TOS (thoracic outlet syndrome)     Chronic pain of both shoulders       Past Medical History:   Diagnosis Date     Benign fasciculation-cramp syndrome      Uncomplicated asthma 1992    viral induced       Past Surgical History:   Procedure Laterality Date     COLONOSCOPY N/A 3/5/2021    Procedure: COLONOSCOPY, WITH POLYPECTOMY;  Surgeon: Jesse Asencio MD;  Location: [facility] OR     NO HISTORY OF SURGERY         Family History   Problem Relation Age of Onset     Hypertension Mother      Colon Cancer Maternal Grandmother      Diabetes Paternal Grandmother      Breast Cancer Paternal Grandmother         in her 70s.     Lupus No family hx of      Rheumatoid Arthritis No family hx of      Rheumatologic Disease No family hx of        Social History     Tobacco Use     Smoking status: Never     Passive exposure: Never     Smokeless tobacco: Never   Substance Use Topics     Alcohol use: Yes     Comment: 1-2 per week             O:  /80   Pulse 85 .       Constitutional/ general:  Pt is in no apparent  distress, appears well-nourished.  Cooperative with history and physical exam.     Psych:  The patient answered questions appropriately.  Normal affect.  Seems to have reasonable expectations, in terms of treatment.     Eyes:  Visual scanning/ tracking without deficit.     Ears:  Response to auditory stimuli is normal.  negative hearing aid devices.  Auricles in proper alignment.     Lymphatic:  Popliteal lymph nodes not enlarged.     Lungs:  Non labored breathing, non labored speech. No cough.  No audible wheezing. Even, quiet breathing.       Vascular:  Pedal pulses are palpable bilaterally for both the DP and PT arteries.  CFT < 3 sec.  No edema.  Pedal hair growth noted.     Neuro:  Alert and oriented x 3. Coordinated gait.  Light touch sensation is intact     Derm: Normal texture and turgor.  No erythema, ecchymosis, or cyanosis.  No open lesions.     Musculoskeletal:   Normal arch foot with weightbearing bilateral.  No forefoot or rear foot deformities noted.  MS 5/5 all compartments.  Normal ROM all fore foot and rearfoot joints with no pain or crepitus.  No equinus.  Pain lateral and dorsal on styloid process left foot.  No edema.  No masses.  No ecchymosis.   No forefoot pain at this time.  Negative Olya's click.  Right Achilles tendon slight pain but hard to tell if its at insertion or watershed area.  No edema erythema or ecchymosis noted.    Radiographic Exam:  X-Ray Findings:  I personally reviewed the films, normal    A:   Left chronic lateral foot pain  Right Achilles tendinitis      Plan:  X-rays from past personally reviewed.  Explained to patient how styloid process major junction for tendons and ligaments and lateral foot.  Discussed how this can get overuse.  May be putting more pressure on this foot with contralateral hip pain.  Discussed weight cork shoes make this mostly feel good.  Continue these.  Made suggestions on other stiff shoes.  We wrote a prescription for orthotics to help  stabilize her lateral column.  Will ice this.  Explained has some Achilles tendinitis on right.  Will ice this as well.  Discussed importance of stretching lower extremities for all problems.  Discussed meloxicam to reduce inflammation.  She is in agreement to trying this.  Will stop if any GI distress.  Styloid process pain not resolving discussed may need removable walking cast until better.  RTC as needed.  Thank you for allowing me participate in the care of this patient.        Victor Manuel Ambrocio DPM, FACFAS         Again, thank you for allowing me to participate in the care of your patient.        Sincerely,        Victor Manuel Ambrocio DPM

## 2024-09-05 DIAGNOSIS — R25.3 BENIGN FASCICULATION-CRAMP SYNDROME: Primary | ICD-10-CM

## 2024-09-06 ENCOUNTER — LAB (OUTPATIENT)
Dept: LAB | Facility: CLINIC | Age: 49
End: 2024-09-06
Payer: COMMERCIAL

## 2024-09-06 DIAGNOSIS — R25.3 BENIGN FASCICULATION-CRAMP SYNDROME: ICD-10-CM

## 2024-09-06 PROCEDURE — 83735 ASSAY OF MAGNESIUM: CPT

## 2024-09-06 PROCEDURE — 36415 COLL VENOUS BLD VENIPUNCTURE: CPT

## 2024-09-07 LAB — MAGNESIUM SERPL-MCNC: 2.1 MG/DL (ref 1.7–2.3)

## 2024-10-16 ENCOUNTER — ANCILLARY PROCEDURE (OUTPATIENT)
Dept: MAMMOGRAPHY | Facility: CLINIC | Age: 49
End: 2024-10-16
Attending: FAMILY MEDICINE
Payer: COMMERCIAL

## 2024-10-16 DIAGNOSIS — Z12.31 VISIT FOR SCREENING MAMMOGRAM: ICD-10-CM

## 2024-10-16 PROCEDURE — 77067 SCR MAMMO BI INCL CAD: CPT | Mod: 26 | Performed by: RADIOLOGY

## 2024-10-16 PROCEDURE — 77063 BREAST TOMOSYNTHESIS BI: CPT | Mod: 26 | Performed by: RADIOLOGY

## 2024-10-16 PROCEDURE — 77063 BREAST TOMOSYNTHESIS BI: CPT

## 2024-10-21 PROBLEM — G54.0 TOS (THORACIC OUTLET SYNDROME): Status: RESOLVED | Noted: 2024-01-09 | Resolved: 2024-10-21

## 2024-10-21 PROBLEM — M25.512 CHRONIC PAIN OF BOTH SHOULDERS: Status: RESOLVED | Noted: 2024-01-09 | Resolved: 2024-10-21

## 2024-10-21 PROBLEM — M54.2 CERVICALGIA: Status: RESOLVED | Noted: 2024-01-09 | Resolved: 2024-10-21

## 2024-10-21 PROBLEM — M25.511 CHRONIC PAIN OF BOTH SHOULDERS: Status: RESOLVED | Noted: 2024-01-09 | Resolved: 2024-10-21

## 2024-10-21 PROBLEM — G89.29 CHRONIC PAIN OF BOTH SHOULDERS: Status: RESOLVED | Noted: 2024-01-09 | Resolved: 2024-10-21

## 2025-04-24 ENCOUNTER — OFFICE VISIT (OUTPATIENT)
Dept: FAMILY MEDICINE | Facility: CLINIC | Age: 50
End: 2025-04-24
Attending: FAMILY MEDICINE
Payer: COMMERCIAL

## 2025-04-24 VITALS
WEIGHT: 197 LBS | HEIGHT: 69 IN | HEART RATE: 64 BPM | TEMPERATURE: 98 F | OXYGEN SATURATION: 98 % | DIASTOLIC BLOOD PRESSURE: 72 MMHG | RESPIRATION RATE: 16 BRPM | SYSTOLIC BLOOD PRESSURE: 120 MMHG | BODY MASS INDEX: 29.18 KG/M2

## 2025-04-24 DIAGNOSIS — Z86.0100 HISTORY OF COLONIC POLYPS: ICD-10-CM

## 2025-04-24 DIAGNOSIS — Z13.29 SCREENING FOR THYROID DISORDER: ICD-10-CM

## 2025-04-24 DIAGNOSIS — Z00.00 ROUTINE GENERAL MEDICAL EXAMINATION AT A HEALTH CARE FACILITY: Primary | ICD-10-CM

## 2025-04-24 DIAGNOSIS — Z12.11 SPECIAL SCREENING FOR MALIGNANT NEOPLASMS, COLON: ICD-10-CM

## 2025-04-24 DIAGNOSIS — Z13.220 LIPID SCREENING: ICD-10-CM

## 2025-04-24 DIAGNOSIS — Z13.1 SCREENING FOR DIABETES MELLITUS: ICD-10-CM

## 2025-04-24 DIAGNOSIS — E66.3 OVERWEIGHT WITH BODY MASS INDEX (BMI) OF 29 TO 29.9 IN ADULT: ICD-10-CM

## 2025-04-24 LAB
ALBUMIN SERPL BCG-MCNC: 4.3 G/DL (ref 3.5–5.2)
ALP SERPL-CCNC: 59 U/L (ref 40–150)
ALT SERPL W P-5'-P-CCNC: 16 U/L (ref 0–50)
ANION GAP SERPL CALCULATED.3IONS-SCNC: 9 MMOL/L (ref 7–15)
AST SERPL W P-5'-P-CCNC: 19 U/L (ref 0–45)
BASOPHILS # BLD AUTO: 0 10E3/UL (ref 0–0.2)
BASOPHILS NFR BLD AUTO: 1 %
BILIRUB SERPL-MCNC: 0.5 MG/DL
BUN SERPL-MCNC: 20.2 MG/DL (ref 6–20)
CALCIUM SERPL-MCNC: 9 MG/DL (ref 8.8–10.4)
CHLORIDE SERPL-SCNC: 108 MMOL/L (ref 98–107)
CHOLEST SERPL-MCNC: 160 MG/DL
CREAT SERPL-MCNC: 1.02 MG/DL (ref 0.51–0.95)
EGFRCR SERPLBLD CKD-EPI 2021: 67 ML/MIN/1.73M2
EOSINOPHIL # BLD AUTO: 0.2 10E3/UL (ref 0–0.7)
EOSINOPHIL NFR BLD AUTO: 3 %
ERYTHROCYTE [DISTWIDTH] IN BLOOD BY AUTOMATED COUNT: 11.6 % (ref 10–15)
EST. AVERAGE GLUCOSE BLD GHB EST-MCNC: 97 MG/DL
FASTING STATUS PATIENT QL REPORTED: ABNORMAL
FASTING STATUS PATIENT QL REPORTED: ABNORMAL
GLUCOSE SERPL-MCNC: 93 MG/DL (ref 70–99)
HBA1C MFR BLD: 5 % (ref 0–5.6)
HCO3 SERPL-SCNC: 26 MMOL/L (ref 22–29)
HCT VFR BLD AUTO: 39.4 % (ref 35–47)
HDLC SERPL-MCNC: 50 MG/DL
HGB BLD-MCNC: 13.4 G/DL (ref 11.7–15.7)
IMM GRANULOCYTES # BLD: 0 10E3/UL
IMM GRANULOCYTES NFR BLD: 0 %
LDLC SERPL CALC-MCNC: 101 MG/DL
LYMPHOCYTES # BLD AUTO: 1.3 10E3/UL (ref 0.8–5.3)
LYMPHOCYTES NFR BLD AUTO: 23 %
MCH RBC QN AUTO: 31 PG (ref 26.5–33)
MCHC RBC AUTO-ENTMCNC: 34 G/DL (ref 31.5–36.5)
MCV RBC AUTO: 91 FL (ref 78–100)
MONOCYTES # BLD AUTO: 0.3 10E3/UL (ref 0–1.3)
MONOCYTES NFR BLD AUTO: 5 %
NEUTROPHILS # BLD AUTO: 4 10E3/UL (ref 1.6–8.3)
NEUTROPHILS NFR BLD AUTO: 68 %
NONHDLC SERPL-MCNC: 110 MG/DL
PLATELET # BLD AUTO: 188 10E3/UL (ref 150–450)
POTASSIUM SERPL-SCNC: 4.2 MMOL/L (ref 3.4–5.3)
PROT SERPL-MCNC: 6.6 G/DL (ref 6.4–8.3)
RBC # BLD AUTO: 4.32 10E6/UL (ref 3.8–5.2)
SODIUM SERPL-SCNC: 143 MMOL/L (ref 135–145)
TRIGL SERPL-MCNC: 47 MG/DL
TSH SERPL DL<=0.005 MIU/L-ACNC: 2.53 UIU/ML (ref 0.3–4.2)
WBC # BLD AUTO: 5.8 10E3/UL (ref 4–11)

## 2025-04-24 SDOH — HEALTH STABILITY: PHYSICAL HEALTH: ON AVERAGE, HOW MANY DAYS PER WEEK DO YOU ENGAGE IN MODERATE TO STRENUOUS EXERCISE (LIKE A BRISK WALK)?: 3 DAYS

## 2025-04-24 SDOH — HEALTH STABILITY: PHYSICAL HEALTH: ON AVERAGE, HOW MANY MINUTES DO YOU ENGAGE IN EXERCISE AT THIS LEVEL?: 40 MIN

## 2025-04-24 ASSESSMENT — PAIN SCALES - GENERAL: PAINLEVEL_OUTOF10: NO PAIN (0)

## 2025-04-24 ASSESSMENT — SOCIAL DETERMINANTS OF HEALTH (SDOH): HOW OFTEN DO YOU GET TOGETHER WITH FRIENDS OR RELATIVES?: TWICE A WEEK

## 2025-04-24 NOTE — PROGRESS NOTES
Preventive Care Visit  [facility]  Derrick Senior DO, Family Medicine  Apr 24, 2025      Assessment & Plan     Routine general medical examination at a health care facility  Doing well.  Exercises without any difficulty.  Advised to follow-up for a colonoscopy next year.  Referral placed  - Lipid panel reflex to direct LDL Fasting; Future  - TSH with free T4 reflex; Future  - CBC with platelets and differential; Future  - Comprehensive metabolic panel (BMP + Alb, Alk Phos, ALT, AST, Total. Bili, TP); Future  - Lipid panel reflex to direct LDL Fasting  - TSH with free T4 reflex  - CBC with platelets and differential  - Comprehensive metabolic panel (BMP + Alb, Alk Phos, ALT, AST, Total. Bili, TP)    History of colonic polyps  Colonoscopy  as planned  - Colonoscopy Screening  Referral; Future    Overweight with body mass index (BMI) of 29 to 29.9 in adult  Dietary and lifestyle changes advised    Special screening for malignant neoplasms, colon    - Colonoscopy Screening  Referral; Future    Lipid screening    - Lipid panel reflex to direct LDL Fasting; Future  - CBC with platelets and differential; Future  - Comprehensive metabolic panel (BMP + Alb, Alk Phos, ALT, AST, Total. Bili, TP); Future  - Lipid panel reflex to direct LDL Fasting  - CBC with platelets and differential  - Comprehensive metabolic panel (BMP + Alb, Alk Phos, ALT, AST, Total. Bili, TP)    Screening for thyroid disorder    - TSH with free T4 reflex; Future  - CBC with platelets and differential; Future  - Comprehensive metabolic panel (BMP + Alb, Alk Phos, ALT, AST, Total. Bili, TP); Future  - TSH with free T4 reflex  - CBC with platelets and differential  - Comprehensive metabolic panel (BMP + Alb, Alk Phos, ALT, AST, Total. Bili, TP)    Screening for diabetes mellitus    - Comprehensive metabolic panel (BMP + Alb, Alk Phos, ALT, AST, Total. Bili, TP); Future  - Hemoglobin A1c; Future  -  "Comprehensive metabolic panel (BMP + Alb, Alk Phos, ALT, AST, Total. Bili, TP)  - Hemoglobin A1c    Patient has been advised of split billing requirements and indicates understanding: Yes        BMI  Estimated body mass index is 29.09 kg/m  as calculated from the following:    Height as of this encounter: 1.753 m (5' 9\").    Weight as of this encounter: 89.4 kg (197 lb).   Weight management plan: Discussed healthy diet and exercise guidelines    Counseling  Appropriate preventive services were addressed with this patient via screening, questionnaire, or discussion as appropriate for fall prevention, nutrition, physical activity, Tobacco-use cessation, social engagement, weight loss and cognition.  Checklist reviewing preventive services available has been given to the patient.  Reviewed patient's diet, addressing concerns and/or questions.   She is at risk for lack of exercise and has been provided with information to increase physical activity for the benefit of her well-being.           Rashel Herrera is a 49 year old, presenting for the following:  Physical        4/24/2025     6:34 AM   Additional Questions   Roomed by gail      Does Pap through OBGYN    HPI     Stregthening and exercise on her own 3 times a week  Diet is good  No smoking history    Pap done with obgyn  Mammo  IUD in place -placed in 2022        Advance Care Planning    Discussed advance care planning with patient; informed AVS has link to [employer].        4/24/2025   General Health   How would you rate your overall physical health? Good   Feel stress (tense, anxious, or unable to sleep) Only a little   (!) STRESS CONCERN      4/24/2025   Nutrition   Three or more servings of calcium each day? (!) NO   Diet: Regular (no restrictions)   How many servings of fruit and vegetables per day? (!) 2-3   How many sweetened beverages each day? 0-1         4/24/2025   Exercise   Days per week of moderate/strenous exercise 3 days   Average " minutes spent exercising at this level 40 min         4/24/2025   Social Factors   Frequency of gathering with friends or relatives Twice a week   Worry food won't last until get money to buy more No   Food not last or not have enough money for food? No   Do you have housing? (Housing is defined as stable permanent housing and does not include staying outside in a car, in a tent, in an abandoned building, in an overnight shelter, or couch-surfing.) Yes   Are you worried about losing your housing? No   Lack of transportation? No   Unable to get utilities (heat,electricity)? No         4/24/2025   Fall Risk   Fallen 2 or more times in the past year? No   Trouble with walking or balance? No          4/24/2025   Dental   Dentist two times every year? Yes         Today's PHQ-2 Score:       4/24/2025     6:03 AM   PHQ-2 ( 1999 [employer])   Q1: Little interest or pleasure in doing things 0   Q2: Feeling down, depressed or hopeless 0   PHQ-2 Score 0    Q1: Little interest or pleasure in doing things Not at all   Q2: Feeling down, depressed or hopeless Not at all   PHQ-2 Score 0       Patient-reported           4/24/2025   Substance Use   Alcohol more than 3/day or more than 7/wk No   Do you use any other substances recreationally? No     Social History     Tobacco Use    Smoking status: Never     Passive exposure: Never    Smokeless tobacco: Never   Vaping Use    Vaping status: Never Used   Substance Use Topics    Alcohol use: Yes     Comment: 1-2 per week    Drug use: No           10/16/2024   LAST FHS-7 RESULTS   1st degree relative breast or ovarian cancer No   Any relative bilateral breast cancer No   Any male have breast cancer No   Any ONE woman have BOTH breast AND ovarian cancer No   Any woman with breast cancer before 50yrs No   2 or more relatives with breast AND/OR ovarian cancer No   2 or more relatives with breast AND/OR bowel cancer No        Mammogram Screening - Mammogram every 1-2 years updated in Health  Maintenance based on mutual decision making        2025   STI Screening   New sexual partner(s) since last STI/HIV test? No     History of abnormal Pap smear: No - age 30-64 HPV with reflex Pap every 5 years recommended        Latest Ref Rng & Units 10/1/2021    12:15 PM 2018     3:32 PM 2018     3:22 PM   PAP / HPV   PAP  Negative for Intraepithelial Lesion or Malignancy (NILM)      PAP (Historical)    NIL    HPV 16 DNA Negative Negative  Negative     HPV 18 DNA Negative Negative  Negative     Other HR HPV Negative Negative  Negative       ASCVD Risk   The 10-year ASCVD risk score (Sangeeta FARIA, et al., 2019) is: 0.9%    Values used to calculate the score:      Age: 49 years      Sex: Female      Is Non- : No      Diabetic: No      Tobacco smoker: No      Systolic Blood Pressure: 120 mmHg      Is BP treated: No      HDL Cholesterol: 52 mg/dL      Total Cholesterol: 161 mg/dL    Fracture Risk Assessment Tool  Link to Frax Calculator  Use the information below to complete the Frax calculator  : 1975  Sex: female  Weight (kg): 89.4 kg (actual weight)  Height (cm): 175.3 cm  Previous Fragility Fracture:  No  History of parent with fractured hip:  No  Current Smoking:  No  Patient has been on glucocorticoids for more than 3 months (5mg/day or more): No  Rheumatoid Arthritis on Problem List:  No  Secondary Osteoporosis on Problem List:  No  Consumes 3 or more units of alcohol per day: No  Femoral Neck BMD (g/cm2)            2025   Contraception/Family Planning   Questions about contraception or family planning No        Reviewed and updated as needed this visit by Provider                    Past Medical History:   Diagnosis Date    Benign fasciculation-cramp syndrome     Uncomplicated asthma 1992    viral induced     Past Surgical History:   Procedure Laterality Date    COLONOSCOPY N/A 3/5/2021    Procedure: COLONOSCOPY, WITH POLYPECTOMY;  Surgeon: Jesse Asencio  "MD Alexis;  [place] [facility] [place]    NO HISTORY OF SURGERY       OB History    Para Term  AB Living   3 3 3 0 0 3   SAB IAB Ectopic Multiple Live Births   0 0 0 0 3      # Outcome Date GA Lbr Josh/2nd Weight Sex Type Anes PTL Lv   3 Term         YISSEL   2 Term         YISSEL   1 Term         YISSEL         Review of Systems  Constitutional, HEENT, cardiovascular, pulmonary, GI, , musculoskeletal, neuro, skin, endocrine and psych systems are negative, except as otherwise noted.     Objective    Exam  /72   Pulse 64   Temp 98  F (36.7  C) (Oral)   Resp 16   Ht 1.753 m (5' 9\")   Wt 89.4 kg (197 lb)   LMP 04/15/2025 (Approximate)   SpO2 98%   BMI 29.09 kg/m     Estimated body mass index is 29.09 kg/m  as calculated from the following:    Height as of this encounter: 1.753 m (5' 9\").    Weight as of this encounter: 89.4 kg (197 lb).    Physical Exam  GENERAL: alert and no distress  EYES: Eyes grossly normal to inspection, PERRL and conjunctivae and sclerae normal  HENT: ear canals and TM's normal, nose and mouth without ulcers or lesions  NECK: no adenopathy, no asymmetry, masses, or scars  RESP: lungs clear to auscultation - no rales, rhonchi or wheezes  CV: regular rate and rhythm, normal S1 S2, no S3 or S4, no murmur, click or rub, no peripheral edema  ABDOMEN: soft, nontender, no hepatosplenomegaly, no masses and bowel sounds normal  MS: no gross musculoskeletal defects noted, no edema  SKIN: no suspicious lesions or rashes  NEURO: Normal strength and tone, mentation intact and speech normal  PSYCH: mentation appears normal, affect normal/bright        Signed Electronically by: Derrick Senior DO    "

## 2025-04-24 NOTE — PATIENT INSTRUCTIONS
Update shots  Colonscopy and pap next march      Patient Education   Preventive Care Advice   This is general advice given by our system to help you stay healthy. However, your care team may have specific advice just for you. Please talk to your care team about your preventive care needs.  Nutrition  Eat 5 or more servings of fruits and vegetables each day.  Try wheat bread, brown rice and whole grain pasta (instead of white bread, rice, and pasta).  Get enough calcium and vitamin D. Check the label on foods and aim for 100% of the RDA (recommended daily allowance).  Lifestyle  Exercise at least 150 minutes each week  (30 minutes a day, 5 days a week).  Do muscle strengthening activities 2 days a week. These help control your weight and prevent disease.  No smoking.  Wear sunscreen to prevent skin cancer.  Have a dental exam and cleaning every 6 months.  Yearly exams  See your health care team every year to talk about:  Any changes in your health.  Any medicines your care team has prescribed.  Preventive care, family planning, and ways to prevent chronic diseases.  Shots (vaccines)   HPV shots (up to age 26), if you've never had them before.  Hepatitis B shots (up to age 59), if you've never had them before.  COVID-19 shot: Get this shot when it's due.  Flu shot: Get a flu shot every year.  Tetanus shot: Get a tetanus shot every 10 years.  Pneumococcal, hepatitis A, and RSV shots: Ask your care team if you need these based on your risk.  Shingles shot (for age 50 and up)  General health tests  Diabetes screening:  Starting at age 35, Get screened for diabetes at least every 3 years.  If you are younger than age 35, ask your care team if you should be screened for diabetes.  Cholesterol test: At age 39, start having a cholesterol test every 5 years, or more often if advised.  Bone density scan (DEXA): At age 50, ask your care team if you should have this scan for osteoporosis (brittle bones).  Hepatitis C: Get tested  at least once in your life.  STIs (sexually transmitted infections)  Before age 24: Ask your care team if you should be screened for STIs.  After age 24: Get screened for STIs if you're at risk. You are at risk for STIs (including HIV) if:  You are sexually active with more than one person.  You don't use condoms every time.  You or a partner was diagnosed with a sexually transmitted infection.  If you are at risk for HIV, ask about PrEP medicine to prevent HIV.  Get tested for HIV at least once in your life, whether you are at risk for HIV or not.  Cancer screening tests  Cervical cancer screening: If you have a cervix, begin getting regular cervical cancer screening tests starting at age 21.  Breast cancer scan (mammogram): If you've ever had breasts, begin having regular mammograms starting at age 40. This is a scan to check for breast cancer.  Colon cancer screening: It is important to start screening for colon cancer at age 45.  Have a colonoscopy test every 10 years (or more often if you're at risk) Or, ask your provider about stool tests like a FIT test every year or Cologuard test every 3 years.  To learn more about your testing options, visit:   .  For help making a decision, visit:   https://bit.ly/lx59049.  Prostate cancer screening test: If you have a prostate, ask your care team if a prostate cancer screening test (PSA) at age 55 is right for you.  Lung cancer screening: If you are a current or former smoker ages 50 to 80, ask your care team if ongoing lung cancer screenings are right for you.  For informational purposes only. Not to replace the advice of your health care provider. Copyright   2023 [employer]. All rights reserved. Clinically reviewed by the [facility]itions [facility]. [employer] 179021 - REV 01/24.

## 2025-05-27 ENCOUNTER — TELEPHONE (OUTPATIENT)
Dept: GASTROENTEROLOGY | Facility: CLINIC | Age: 50
End: 2025-05-27
Payer: COMMERCIAL

## 2025-05-27 NOTE — TELEPHONE ENCOUNTER
"Endoscopy Scheduling Screen    Caller: patient    Have you had any respiratory illness or flu-like symptoms in the last 10 days?  No    What is your communication preference for Instructions and/or Bowel Prep?   MyChart    What insurance is in the chart?  Other:  [employer]    Ordering/Referring Provider: RAISA EPPERSON    (If ordering provider performs procedure, schedule with ordering provider unless otherwise instructed. )    BMI: Estimated body mass index is 29.09 kg/m  as calculated from the following:    Height as of 4/24/25: 1.753 m (5' 9\").    Weight as of 4/24/25: 89.4 kg (197 lb).     Sedation Ordered  moderate sedation.   If patient BMI > 50 do not schedule in [facility].    If patient BMI > 45 do not schedule at [facility].    Are you taking methadone or Suboxone?  NO, No RN review required.    Have you been diagnosed and are being treated for severe PTSD or severe anxiety?  NO, No RN review required.    Are you taking any prescription medications for pain 3 or more times per week?   NO, No RN review required.    Do you have a history of malignant hyperthermia?  No    (Females) Are you currently pregnant?   No     Have you been diagnosed or told you have pulmonary hypertension?   No    Do you have an LVAD?  No    Have you been told you have moderate to severe sleep apnea?  No.    Have you been told you have COPD, asthma, or any other lung disease?  No    Has your doctor ordered any cardiac tests like echo, angiogram, stress test, ablation, or EKG, that you have not completed yet?  No    Do you  have a history of any heart conditions?  No     Have you ever had or are you waiting for an organ transplant?  No. Continue scheduling, no site restrictions.    Have you had a stroke or transient ischemic attack (TIA aka \"mini stroke\") in the last 2 years?   No.    Have you been diagnosed with or been told you have cirrhosis of the liver?   No.    Are you currently on dialysis?   No    Do you need assistance " "transferring?   No    BMI: Estimated body mass index is 29.09 kg/m  as calculated from the following:    Height as of 4/24/25: 1.753 m (5' 9\").    Weight as of 4/24/25: 89.4 kg (197 lb).     Is patients BMI > 40 and scheduling location [facility]?  No    Do you take an injectable or oral medication for weight loss or diabetes (excluding insulin)?  No    Do you take the medication Naltrexone?  No    Do you take blood thinners?  No       Prep   Are you currently on dialysis or do you have chronic kidney disease?  No    Do you have a diagnosis of diabetes?  No    Do you have a diagnosis of cystic fibrosis (CF)?  No    On a regular basis do you go 3 -5 days between bowel movements?  No    BMI > 40?  No    Preferred Pharmacy:    [facility]  [address]  [address]  [address]  [facility] [address]  Phone: [phone] Fax: [phone]      Final Scheduling Details     Procedure scheduled  Colonoscopy    Surgeon:  JAYY     Date of procedure:  10/9/25     Pre-OP / PAC:   No - Not required for this site.    Location  [facility] - [facility] - Patient preference.    Sedation   Moderate Sedation - Per order.      Patient Reminders:   You will receive a call from a Nurse to review instructions and health history.  This assessment must be completed prior to your procedure.  Failure to complete the Nurse assessment may result in the procedure being cancelled.      On the day of your procedure, please designate an adult(s) who can drive you home stay with you for the next 24 hours. The medicines used in the exam will make you sleepy. You will not be able to drive.      You cannot take public transportation, ride share services, or non-medical taxi service without a responsible caregiver.  Medical transport services are allowed with the requirement that a responsible caregiver will receive you at your destination.  We require that drivers and caregivers are confirmed prior to your procedure.  "

## (undated) DEVICE — KIT ENDO FIRST STEP DISINFECTANT 200ML W/POUCH EP-4

## (undated) DEVICE — TUBING SUCTION 12"X1/4" N612

## (undated) DEVICE — ENDO TRAP POLYP E-TRAP 00711099

## (undated) DEVICE — KIT ENDO TURNOVER/PROCEDURE CARRY-ON 101822

## (undated) DEVICE — SNARE CAPIVATOR ROUND COLD SNR BX10 M00561101

## (undated) DEVICE — SPECIMEN CONTAINER 3OZ W/FORMALIN 59901

## (undated) DEVICE — SOL WATER IRRIG 1000ML BOTTLE 2F7114

## (undated) DEVICE — SUCTION MANIFOLD NEPTUNE 2 SYS 1 PORT 702-025-000

## (undated) DEVICE — GOWN IMPERVIOUS 2XL BLUE

## (undated) RX ORDER — FENTANYL CITRATE 50 UG/ML
INJECTION, SOLUTION INTRAMUSCULAR; INTRAVENOUS
Status: DISPENSED
Start: 2021-03-05